# Patient Record
Sex: FEMALE | Race: WHITE | Employment: UNEMPLOYED | URBAN - METROPOLITAN AREA
[De-identification: names, ages, dates, MRNs, and addresses within clinical notes are randomized per-mention and may not be internally consistent; named-entity substitution may affect disease eponyms.]

---

## 2017-02-09 ENCOUNTER — TRANSFERRED RECORDS (OUTPATIENT)
Dept: HEALTH INFORMATION MANAGEMENT | Facility: CLINIC | Age: 52
End: 2017-02-09

## 2017-02-09 LAB — EJECTION FRACTION: 58

## 2017-02-13 ENCOUNTER — TELEPHONE (OUTPATIENT)
Dept: INTERNAL MEDICINE | Facility: CLINIC | Age: 52
End: 2017-02-13

## 2017-02-13 NOTE — TELEPHONE ENCOUNTER
Hospital follow up    Admission date: 2/9/17? Pt unsure     Discharge date: 2/12/17    Hospital: [facility]    Reason for hospitalization: pt unsure                     (Note to staff: cut and paste discharge summary here, or go into Care                                     Everywhere, or have Pt sign FRITZ and call for fax of information)    Discharge summary reviewed:       Medication changes:       Follow up needed for today:       Current status:

## 2017-02-13 NOTE — TELEPHONE ENCOUNTER
"  ED for acute condition Discharge Protocol    \"Hi, my name is Jennifer Salas, a registered nurse, and I am calling from [facility].  I am calling to follow up and see how things are going for you after your recent emergency visit.\"    Tell me how you are doing now that you are home?\"   Doing pretty good, now using oxygen.  Cough continues.       Discharge Instructions    \"Let's review your discharge instructions.  What is/are the follow-up recommendations?  Pt. Response: Oxygen, See Cardiologist, PCP on 02/17/2017     \"Has an appointment with your primary care provider been scheduled?\"  Yes. (confirm and remind to bring meds)    Medications    \"Tell me what changed about your medicines when you discharged?\"    Start Oxygen, reports she will bring list of medications with to her appointment.     \"What questions do you have about your medications?\"   None        Call Summary    \"What questions or concerns do you have about your recent visit and your follow-up care?\"     none    \"If you have questions or things don't continue to improve, we encourage you contact us through the main clinic number (give number).  Even if the clinic is not open, triage nurses are available 24/7 to help you.     We would like you to know that our clinic has extended hours (provide information).  We also have urgent care (provide details on closest location and hours/contact info)\"    \"Thank you for your time and take care!\"      Jennifer Salas RN           "

## 2017-02-14 ENCOUNTER — TRANSFERRED RECORDS (OUTPATIENT)
Dept: HEALTH INFORMATION MANAGEMENT | Facility: CLINIC | Age: 52
End: 2017-02-14

## 2017-02-15 ENCOUNTER — TELEPHONE (OUTPATIENT)
Dept: FAMILY MEDICINE | Facility: CLINIC | Age: 52
End: 2017-02-15

## 2017-02-15 NOTE — TELEPHONE ENCOUNTER
Nurse called back to give a correction to this message. Patient is having an angiogram, not a stress test on Friday.

## 2017-02-15 NOTE — TELEPHONE ENCOUNTER
She completed a post discharge follow up call today.  She is doing well.  She did have a question with her asperin.  It says to take a chewable but she has been taking a regular aspirin with breakfast.  Did discuss other red flags to watch for.  You can call with questions.  She will follow up in two weeks.  She has a stress test on Friday.

## 2017-02-17 ENCOUNTER — TRANSFERRED RECORDS (OUTPATIENT)
Dept: HEALTH INFORMATION MANAGEMENT | Facility: CLINIC | Age: 52
End: 2017-02-17

## 2017-02-17 LAB
CHOLEST SERPL-MCNC: 203 MG/DL (ref 100–199)
HDLC SERPL-MCNC: 45 MG/DL
LDLC SERPL CALC-MCNC: 129 MG/DL
NONHDLC SERPL-MCNC: 158 MG/DL
TRIGL SERPL-MCNC: 146 MG/DL

## 2017-02-21 ENCOUNTER — OFFICE VISIT (OUTPATIENT)
Dept: INTERNAL MEDICINE | Facility: CLINIC | Age: 52
End: 2017-02-21
Payer: COMMERCIAL

## 2017-02-21 VITALS
TEMPERATURE: 98.4 F | BODY MASS INDEX: 29.81 KG/M2 | HEART RATE: 66 BPM | HEIGHT: 62 IN | RESPIRATION RATE: 16 BRPM | DIASTOLIC BLOOD PRESSURE: 66 MMHG | SYSTOLIC BLOOD PRESSURE: 126 MMHG | OXYGEN SATURATION: 98 % | WEIGHT: 162 LBS

## 2017-02-21 DIAGNOSIS — J44.9 CHRONIC OBSTRUCTIVE PULMONARY DISEASE, UNSPECIFIED COPD TYPE (H): Primary | ICD-10-CM

## 2017-02-21 DIAGNOSIS — E11.65 TYPE 2 DIABETES MELLITUS WITH HYPERGLYCEMIA, WITHOUT LONG-TERM CURRENT USE OF INSULIN (H): ICD-10-CM

## 2017-02-21 DIAGNOSIS — I25.10 CORONARY ARTERY DISEASE INVOLVING NATIVE CORONARY ARTERY OF NATIVE HEART, ANGINA PRESENCE UNSPECIFIED: ICD-10-CM

## 2017-02-21 LAB
ALBUMIN SERPL-MCNC: 2.8 G/DL (ref 3.4–5)
ALP SERPL-CCNC: 167 U/L (ref 40–150)
ALT SERPL W P-5'-P-CCNC: 29 U/L (ref 0–50)
ANION GAP SERPL CALCULATED.3IONS-SCNC: 2 MMOL/L (ref 3–14)
AST SERPL W P-5'-P-CCNC: 12 U/L (ref 0–45)
BILIRUB SERPL-MCNC: 0.4 MG/DL (ref 0.2–1.3)
BUN SERPL-MCNC: 15 MG/DL (ref 7–30)
CALCIUM SERPL-MCNC: 8.7 MG/DL (ref 8.5–10.1)
CHLORIDE SERPL-SCNC: 103 MMOL/L (ref 94–109)
CO2 SERPL-SCNC: 38 MMOL/L (ref 20–32)
CREAT SERPL-MCNC: 0.7 MG/DL (ref 0.52–1.04)
ERYTHROCYTE [DISTWIDTH] IN BLOOD BY AUTOMATED COUNT: 14.6 % (ref 10–15)
GFR SERPL CREATININE-BSD FRML MDRD: 88 ML/MIN/1.7M2
GLUCOSE SERPL-MCNC: 106 MG/DL (ref 70–99)
HBA1C MFR BLD: 7.3 % (ref 4.3–6)
HCT VFR BLD AUTO: 43.7 % (ref 35–47)
HGB BLD-MCNC: 13.1 G/DL (ref 11.7–15.7)
MCH RBC QN AUTO: 30 PG (ref 26.5–33)
MCHC RBC AUTO-ENTMCNC: 30 G/DL (ref 31.5–36.5)
MCV RBC AUTO: 100 FL (ref 78–100)
PLATELET # BLD AUTO: 192 10E9/L (ref 150–450)
POTASSIUM SERPL-SCNC: 4.5 MMOL/L (ref 3.4–5.3)
PROT SERPL-MCNC: 6.6 G/DL (ref 6.8–8.8)
RBC # BLD AUTO: 4.36 10E12/L (ref 3.8–5.2)
SODIUM SERPL-SCNC: 143 MMOL/L (ref 133–144)
WBC # BLD AUTO: 7.5 10E9/L (ref 4–11)

## 2017-02-21 PROCEDURE — 83036 HEMOGLOBIN GLYCOSYLATED A1C: CPT | Performed by: INTERNAL MEDICINE

## 2017-02-21 PROCEDURE — 80053 COMPREHEN METABOLIC PANEL: CPT | Performed by: INTERNAL MEDICINE

## 2017-02-21 PROCEDURE — 36415 COLL VENOUS BLD VENIPUNCTURE: CPT | Performed by: INTERNAL MEDICINE

## 2017-02-21 PROCEDURE — 99214 OFFICE O/P EST MOD 30 MIN: CPT | Performed by: INTERNAL MEDICINE

## 2017-02-21 PROCEDURE — 85027 COMPLETE CBC AUTOMATED: CPT | Performed by: INTERNAL MEDICINE

## 2017-02-21 RX ORDER — METOPROLOL TARTRATE 50 MG
50 TABLET ORAL 2 TIMES DAILY
COMMUNITY
End: 2017-03-14

## 2017-02-21 RX ORDER — QUETIAPINE FUMARATE 25 MG/1
25 TABLET, FILM COATED ORAL AT BEDTIME
COMMUNITY
End: 2017-02-27

## 2017-02-21 ASSESSMENT — PAIN SCALES - GENERAL: PAINLEVEL: MILD PAIN (2)

## 2017-02-21 NOTE — PROGRESS NOTES
SUBJECTIVE:                                                    Bernard Hughes is a 51 year old female who presents to clinic today for the following health issues:      Hospital Follow-up Visit:    Hospital/Nursing Home/IP Rehab Facility: [facility]  Date of Admission: 2/16/17  Date of Discharge: 2/18/17  Reason(s) for Admission: COPD/Bronchitis            Problems taking medications regularly:  None       Medication changes since discharge: None       Problems adhering to non-medication therapy:  None    Summary of hospitalization:  See outside records, reviewed and scanned  Diagnostic Tests/Treatments reviewed.  Follow up needed: none  Other Healthcare Providers Involved in Patient s Care:         None  Update since discharge: improved.     Post Discharge Medication Reconciliation: discharge medications reconciled and changed, per note/orders (see AVS).  Plan of care communicated with patient and family       She was hospitalized for copd exacerbation and then found to have some heart issues.     Repeat hospitalization for an angiogram and IVF.    Plan for triple bypass on March 3rd with Dr Gary.     Lungs are better, was on prednisone and antibiotics, still some cough and congestion.  Feels a lot better.  Had an echo that was abnormal.      Sugars are getting back to normal on glyburide and metformin combination, high was 200, yesterday was 107.      Past Medical History   Diagnosis Date     COPD (chronic obstructive pulmonary disease) (H)      Diabetes (H)      Hypertension      Kidney disease    .ASCVD    Past Surgical History   Procedure Laterality Date     Ceserean       Current Outpatient Prescriptions   Medication     metoprolol (LOPRESSOR) 50 MG tablet     QUEtiapine (SEROQUEL) 25 MG tablet     albuterol (VENTOLIN HFA) 108 (90 BASE) MCG/ACT Inhaler     SPIRIVA HANDIHALER 18 MCG inhalation capsule     FLUoxetine (PROZAC) 20 MG capsule     glyBURIDE-metFORMIN (GLUCOVANCE) 5-500 MG per tablet     lisinopril  "(PRINIVIL,ZESTRIL) 20 MG tablet     aspirin 81 MG EC tablet     atorvastatin (LIPITOR) 20 MG tablet     No current facility-administered medications for this visit.      Social History   Substance Use Topics     Smoking status: Former Smoker     Smokeless tobacco: Not on file     Alcohol use Not on file     Review of Systems  Constitutional-No fevers, chills, or weight changes..  ENT-No earpain, sore throat, voice changes or rhinitis.  Cardiac-No chest pain or palpitations.  Respiratory-Cough without sputum and SOB.  Musculoskeletal-No muscles aches or joint pains.    Physical Exam  /66 (BP Location: Left arm, Patient Position: Chair, Cuff Size: Adult Regular)  Pulse 66  Temp 98.4  F (36.9  C) (Temporal)  Resp 16  Ht 5' 2\" (1.575 m)  Wt 162 lb (73.5 kg)  SpO2 98%  BMI 29.63 kg/m2  General Appearance-alert, no distress  Cardiac-regular rate and rhythm  with normal S1, S2 ; no murmur, rub or gallops  Lungs-mild to moderate expiratory rhonchi  Extremities-no peripheral edema, peripheral pulses normal    ASSESSMENT:  Patient has history of COPD, diabetes, and was a smoker. She had a COPD exacerbation was hospitalized at [facility]. All there she had an echo which showed a area of a possible old MI with preserved ejection fraction. She had an elevation of her troponin. Follow-up with cardiology recommended an angiogram and she was found to have three-vessel disease. The plan is for her to have coronary artery bypass surgery on March 3 with Dr. Gary.    COPD-the patient COPD is improving she has quit smoking and is congratulated on this. She has finished her prednisone and antibiotics. She continues on Spiriva and oxygen therapy.    Coronary artery disease-the patient is on aspirin, atorvastatin, metoprolol and lisinopril. The plan is to do bypass surgery on March 3.    Diabetes-the patient is on glyburide and metformin, her sugars are normally pretty good and we'll recheck an A1c today. She will need insulin " during her hospitalization.    Post angiogram we will check her renal function today.    Tobacco abuse-the patient is congratulated on her smoking cessation.    Long discussion about the need for cardiac rehabilitation and how this will help her lungs and heart post surgery.    Electronically signed by Dorian Cade MD

## 2017-02-21 NOTE — NURSING NOTE
"Chief Complaint   Patient presents with     Hospital F/U       Initial /66 (BP Location: Left arm, Patient Position: Chair, Cuff Size: Adult Regular)  Pulse 66  Temp 98.4  F (36.9  C) (Temporal)  Resp 16  Ht 5' 2\" (1.575 m)  Wt 162 lb (73.5 kg)  SpO2 98%  BMI 29.63 kg/m2 Estimated body mass index is 29.63 kg/(m^2) as calculated from the following:    Height as of this encounter: 5' 2\" (1.575 m).    Weight as of this encounter: 162 lb (73.5 kg).  Medication Reconciliation: complete   Taylor Hughes MA    "

## 2017-02-21 NOTE — MR AVS SNAPSHOT
"              After Visit Summary   2017    Bernard Hughes    MRN: [id number]           Patient Information     Date Of Birth          1965        Visit Information        Provider Department      2017 7:00 AM Dorian Cade MD [facility]         Follow-ups after your visit        Who to contact     If you have questions or need follow up information about today's clinic visit or your schedule please contact [facility] directly at [phone].  Normal or non-critical lab and imaging results will be communicated to you by Servo Softwarehart, letter or phone within 4 business days after the clinic has received the results. If you do not hear from us within 7 days, please contact the clinic through [employer] Software[employer] or phone. If you have a critical or abnormal lab result, we will notify you by phone as soon as possible.  Submit refill requests through Advaction or call your pharmacy and they will forward the refill request to us. Please allow 3 business days for your refill to be completed.          Additional Information About Your Visit        Serv[employer] Software[employer] Information     Advaction lets you send messages to your doctor, view your test results, renew your prescriptions, schedule appointments and more. To sign up, go to [email or web address] . Click on \"Log in\" on the left side of the screen, which will take you to the Welcome page. Then click on \"Sign up Now\" on the right side of the page.     You will be asked to enter the access code listed below, as well as some personal information. Please follow the directions to create your username and password.     Your access code is: [email or web address]  Expires: 2017  7:09 AM     Your access code will  in 90 days. If you need help or a new code, please call your [facility] or [phone].        Care EveryWhere ID     This is your Care EveryWhere ID. This could be used by other organizations to access your [place] medical " "records  [id number]        Your Vitals Were     Pulse Temperature Respirations Height Pulse Oximetry BMI (Body Mass Index)    66 98.4  F (36.9  C) (Temporal) 16 5' 2\" (1.575 m) 98% 29.63 kg/m2       Blood Pressure from Last 3 Encounters:   02/21/17 126/66   05/31/16 128/86   12/16/15 128/82    Weight from Last 3 Encounters:   02/21/17 162 lb (73.5 kg)   05/31/16 154 lb (69.9 kg)   12/16/15 140 lb (63.5 kg)              Today, you had the following     No orders found for display         Today's Medication Changes          These changes are accurate as of: 2/21/17  7:09 AM.  If you have any questions, ask your nurse or doctor.               These medicines have changed or have updated prescriptions.        Dose/Directions    atorvastatin 20 MG tablet   Commonly known as:  LIPITOR   This may have changed:  how much to take   Used for:  Type 2 diabetes mellitus with hyperglycemia (H)        Dose:  20 mg   Take 1 tablet (20 mg) by mouth daily   Quantity:  90 tablet   Refills:  1                Primary Care Provider Office Phone # Fax #    Dorian Cade [phone]       [facility] [address] [place] [address]        Thank you!     Thank you for choosing [facility]  for your care. Our goal is always to provide you with excellent care. Hearing back from our patients is one way we can continue to improve our services. Please take a few minutes to complete the written survey that you may receive in the mail after your visit with us. Thank you!             Your Updated Medication List - Protect others around you: Learn how to safely use, store and throw away your medicines at www.disposemymeds.org.          This list is accurate as of: 2/21/17  7:09 AM.  Always use your most recent med list.                   Brand Name Dispense Instructions for use    albuterol 108 (90 BASE) MCG/ACT Inhaler    VENTOLIN HFA    18 g    INHALE 2 PUFFS INTO THE LUNGS EVERY 4 HOURS AS " NEEDED FOR SHORTNESS OF BREATH / DYSPNEA       aspirin 81 MG EC tablet     90 tablet    Take 1 tablet (81 mg) by mouth daily       atorvastatin 20 MG tablet    LIPITOR    90 tablet    Take 1 tablet (20 mg) by mouth daily       FLUoxetine 20 MG capsule    PROzac    90 capsule    Take 1 capsule (20 mg) by mouth daily       glyBURIDE-metFORMIN 5-500 MG per tablet    GLUCOVANCE    360 tablet    Take 2 tablets by mouth 2 times daily (with meals)       lisinopril 20 MG tablet    PRINIVIL/ZESTRIL    180 tablet    Take 2 tablets (40 mg) by mouth daily       metoprolol 50 MG tablet    LOPRESSOR     Take 50 mg by mouth 2 times daily       QUEtiapine 25 MG tablet    SEROquel     Take 25 mg by mouth At Bedtime       SPIRIVA HANDIHALER 18 MCG capsule   Generic drug:  tiotropium     90 capsule    USING THE HANDIHALER, INHALE THE CONTENTS OF ONE CAPSULE DAILY

## 2017-02-27 ENCOUNTER — TELEPHONE (OUTPATIENT)
Dept: INTERNAL MEDICINE | Facility: CLINIC | Age: 52
End: 2017-02-27

## 2017-02-27 ENCOUNTER — HOSPITAL ENCOUNTER (OUTPATIENT)
Dept: GENERAL RADIOLOGY | Facility: CLINIC | Age: 52
Discharge: HOME | End: 2017-02-27
Attending: INTERNAL MEDICINE | Admitting: INTERNAL MEDICINE
Payer: COMMERCIAL

## 2017-02-27 ENCOUNTER — OFFICE VISIT (OUTPATIENT)
Dept: INTERNAL MEDICINE | Facility: CLINIC | Age: 52
End: 2017-02-27
Payer: COMMERCIAL

## 2017-02-27 VITALS
HEART RATE: 62 BPM | RESPIRATION RATE: 16 BRPM | WEIGHT: 162 LBS | DIASTOLIC BLOOD PRESSURE: 86 MMHG | BODY MASS INDEX: 29.63 KG/M2 | SYSTOLIC BLOOD PRESSURE: 164 MMHG | TEMPERATURE: 97.1 F

## 2017-02-27 DIAGNOSIS — I25.10 CORONARY ARTERY DISEASE INVOLVING NATIVE CORONARY ARTERY OF NATIVE HEART, ANGINA PRESENCE UNSPECIFIED: ICD-10-CM

## 2017-02-27 DIAGNOSIS — J44.1 CHRONIC OBSTRUCTIVE PULMONARY DISEASE WITH ACUTE EXACERBATION (H): Primary | ICD-10-CM

## 2017-02-27 DIAGNOSIS — J44.1 CHRONIC OBSTRUCTIVE PULMONARY DISEASE WITH ACUTE EXACERBATION (H): ICD-10-CM

## 2017-02-27 PROCEDURE — 99213 OFFICE O/P EST LOW 20 MIN: CPT | Performed by: INTERNAL MEDICINE

## 2017-02-27 PROCEDURE — 71020 XR CHEST 2 VW: CPT | Mod: TC

## 2017-02-27 RX ORDER — AZITHROMYCIN 250 MG/1
TABLET, FILM COATED ORAL
Qty: 6 TABLET | Refills: 0 | Status: SHIPPED | OUTPATIENT
Start: 2017-02-27 | End: 2017-03-14

## 2017-02-27 ASSESSMENT — PAIN SCALES - GENERAL: PAINLEVEL: NO PAIN (0)

## 2017-02-27 NOTE — MR AVS SNAPSHOT
"              After Visit Summary   2017    Bernard Hughes    MRN: [id number]           Patient Information     Date Of Birth          1965        Visit Information        Provider Department      2017 3:45 PM Dorian Cade MD [facility]         Follow-ups after your visit        Who to contact     If you have questions or need follow up information about today's clinic visit or your schedule please contact [facility] directly at [phone].  Normal or non-critical lab and imaging results will be communicated to you by The Daily Hundredhart, letter or phone within 4 business days after the clinic has received the results. If you do not hear from us within 7 days, please contact the clinic through The Daily Hundredhart or phone. If you have a critical or abnormal lab result, we will notify you by phone as soon as possible.  Submit refill requests through Anexon or call your pharmacy and they will forward the refill request to us. Please allow 3 business days for your refill to be completed.          Additional Information About Your Visit        The Daily HundredThe [facility] Networks Information     Anexon lets you send messages to your doctor, view your test results, renew your prescriptions, schedule appointments and more. To sign up, go to [email or web address] . Click on \"Log in\" on the left side of the screen, which will take you to the Welcome page. Then click on \"Sign up Now\" on the right side of the page.     You will be asked to enter the access code listed below, as well as some personal information. Please follow the directions to create your username and password.     Your access code is: [email or web address]  Expires: 2017  7:09 AM     Your access code will  in 90 days. If you need help or a new code, please call your [facility] or [phone].        Care EveryWhere ID     This is your Care EveryWhere ID. This could be used by other organizations to access your [place] medical " records  [id number]        Your Vitals Were     Pulse Temperature Respirations BMI (Body Mass Index)          62 97.1  F (36.2  C) (Tympanic) 16 29.63 kg/m2         Blood Pressure from Last 3 Encounters:   02/27/17 180/90   02/21/17 126/66   05/31/16 128/86    Weight from Last 3 Encounters:   02/27/17 162 lb (73.5 kg)   02/21/17 162 lb (73.5 kg)   05/31/16 154 lb (69.9 kg)              Today, you had the following     No orders found for display         Today's Medication Changes          These changes are accurate as of: 2/27/17  3:50 PM.  If you have any questions, ask your nurse or doctor.               These medicines have changed or have updated prescriptions.        Dose/Directions    atorvastatin 20 MG tablet   Commonly known as:  LIPITOR   This may have changed:  how much to take   Used for:  Type 2 diabetes mellitus with hyperglycemia (H)        Dose:  20 mg   Take 1 tablet (20 mg) by mouth daily   Quantity:  90 tablet   Refills:  1                Primary Care Provider Office Phone # Fax #    Dorian Cade [phone]       [facility] [address] [place] [address]        Thank you!     Thank you for choosing [facility]  for your care. Our goal is always to provide you with excellent care. Hearing back from our patients is one way we can continue to improve our services. Please take a few minutes to complete the written survey that you may receive in the mail after your visit with us. Thank you!             Your Updated Medication List - Protect others around you: Learn how to safely use, store and throw away your medicines at www.disposemymeds.org.          This list is accurate as of: 2/27/17  3:50 PM.  Always use your most recent med list.                   Brand Name Dispense Instructions for use    albuterol 108 (90 BASE) MCG/ACT Inhaler    VENTOLIN HFA    18 g    INHALE 2 PUFFS INTO THE LUNGS EVERY 4 HOURS AS NEEDED FOR SHORTNESS OF BREATH /  DYSPNEA       aspirin 81 MG EC tablet     90 tablet    Take 1 tablet (81 mg) by mouth daily       atorvastatin 20 MG tablet    LIPITOR    90 tablet    Take 1 tablet (20 mg) by mouth daily       FLUoxetine 20 MG capsule    PROzac    90 capsule    Take 1 capsule (20 mg) by mouth daily       glyBURIDE-metFORMIN 5-500 MG per tablet    GLUCOVANCE    360 tablet    Take 2 tablets by mouth 2 times daily (with meals)       lisinopril 20 MG tablet    PRINIVIL/ZESTRIL    180 tablet    Take 2 tablets (40 mg) by mouth daily       metoprolol 50 MG tablet    LOPRESSOR     Take 50 mg by mouth 2 times daily       SPIRIVA HANDIHALER 18 MCG capsule   Generic drug:  tiotropium     90 capsule    USING THE HANDIHALER, INHALE THE CONTENTS OF ONE CAPSULE DAILY

## 2017-02-27 NOTE — NURSING NOTE
"Chief Complaint   Patient presents with     Cough     follow up       Initial /90 (BP Location: Left arm, Patient Position: Chair, Cuff Size: Adult Regular)  Pulse 62  Temp 97.1  F (36.2  C) (Tympanic)  Resp 16  Wt 162 lb (73.5 kg)  BMI 29.63 kg/m2 Estimated body mass index is 29.63 kg/(m^2) as calculated from the following:    Height as of 2/21/17: 5' 2\" (1.575 m).    Weight as of this encounter: 162 lb (73.5 kg).  Medication Reconciliation: complete   Yuly Lenz CMA (AAMA)   "

## 2017-02-27 NOTE — PROGRESS NOTES
SUBJECTIVE:                                                    Bernard Hughes is a 51 year old female who presents to clinic today for the following health issues:      Clinic Visit Followup:    Facility:  [facility]  Date of visit: 2/21/17  Reason for visit: COPD, Cough  Current Status: Patient is still coughing alot     She is having sweats and chills, then more cough and feels like bronchitis. Some sputum as well.  Still eating.  Green sputum and phlegm.      Supposed to have triple bypass this Thursday.  BP is running high as well.    Past Medical History   Diagnosis Date     ASCVD (arteriosclerotic cardiovascular disease)      3 vessel bypass 3/2017     COPD (chronic obstructive pulmonary disease) (H)      Diabetes (H)      Hypertension      Kidney disease      Current Outpatient Prescriptions   Medication     metoprolol (LOPRESSOR) 50 MG tablet     SPIRIVA HANDIHALER 18 MCG inhalation capsule     FLUoxetine (PROZAC) 20 MG capsule     atorvastatin (LIPITOR) 20 MG tablet     glyBURIDE-metFORMIN (GLUCOVANCE) 5-500 MG per tablet     lisinopril (PRINIVIL,ZESTRIL) 20 MG tablet     aspirin 81 MG EC tablet     albuterol (VENTOLIN HFA) 108 (90 BASE) MCG/ACT Inhaler     No current facility-administered medications for this visit.      Social History   Substance Use Topics     Smoking status: Former Smoker     Smokeless tobacco: Not on file     Alcohol use Not on file     Physical Exam  /90 (BP Location: Left arm, Patient Position: Chair, Cuff Size: Adult Regular)  Pulse 62  Temp 97.1  F (36.2  C) (Tympanic)  Resp 16  Wt 162 lb (73.5 kg)  BMI 29.63 kg/m2  General Appearance-alert, mild distress  ENT-ENT exam normal, no neck nodes or sinus tenderness  Cardiac-regular rate and rhythm  with normal S1, S2 ; no murmur, rub or gallops  Lungs-mild to moderate expiratory rhonchi    ASSESSMENT:  Patient has a history of COPD, presented with COPD exacerbation with cough, fever, greenish sputum. We will treat her with a Z-Maurilio  today. She is not wheezing so we'll avoid steroids. I will get a chest x-ray is expected to have triple bypass heart surgery later this week however this may be canceled.

## 2017-02-27 NOTE — TELEPHONE ENCOUNTER
Reason for Call:  Medication or medication refill:    Do you use a [facility]?  Name of the pharmacy and phone number for the current request:  [facility]- [phone]    Name of the medication requested: antibiotic     Other request: pt stated she has bronchitis. Pt is scheduled for surgery 3/2.    Can we leave a detailed message on this number? YES    Phone number patient can be reached at: Home number on file [phone] (home)    Best Time:     Call taken on 2/27/2017 at 7:48 AM by Tamar Davis

## 2017-03-01 ENCOUNTER — OFFICE VISIT (OUTPATIENT)
Dept: INTERNAL MEDICINE | Facility: CLINIC | Age: 52
End: 2017-03-01
Payer: COMMERCIAL

## 2017-03-01 VITALS
RESPIRATION RATE: 16 BRPM | WEIGHT: 160 LBS | TEMPERATURE: 97.9 F | BODY MASS INDEX: 29.44 KG/M2 | DIASTOLIC BLOOD PRESSURE: 86 MMHG | OXYGEN SATURATION: 95 % | SYSTOLIC BLOOD PRESSURE: 134 MMHG | HEART RATE: 64 BPM | HEIGHT: 62 IN

## 2017-03-01 DIAGNOSIS — J44.1 CHRONIC OBSTRUCTIVE PULMONARY DISEASE WITH ACUTE EXACERBATION (H): Primary | ICD-10-CM

## 2017-03-01 PROCEDURE — 99213 OFFICE O/P EST LOW 20 MIN: CPT | Performed by: INTERNAL MEDICINE

## 2017-03-01 ASSESSMENT — PAIN SCALES - GENERAL: PAINLEVEL: NO PAIN (0)

## 2017-03-01 NOTE — MR AVS SNAPSHOT
"              After Visit Summary   3/1/2017    Bernard Hughes    MRN: [id number]           Patient Information     Date Of Birth          1965        Visit Information        Provider Department      3/1/2017 2:30 PM Dorian Cade MD [facility]        Today's Diagnoses     Chronic obstructive pulmonary disease with acute exacerbation (H)    -  1       Follow-ups after your visit        Who to contact     If you have questions or need follow up information about today's clinic visit or your schedule please contact [facility] directly at [phone].  Normal or non-critical lab and imaging results will be communicated to you by ZeroWire Inchart, letter or phone within 4 business days after the clinic has received the results. If you do not hear from us within 7 days, please contact the clinic through Fik Storest or phone. If you have a critical or abnormal lab result, we will notify you by phone as soon as possible.  Submit refill requests through Vyykn or call your pharmacy and they will forward the refill request to us. Please allow 3 business days for your refill to be completed.          Additional Information About Your Visit        MyChart Information     Vyykn lets you send messages to your doctor, view your test results, renew your prescriptions, schedule appointments and more. To sign up, go to [email or web address] . Click on \"Log in\" on the left side of the screen, which will take you to the Welcome page. Then click on \"Sign up Now\" on the right side of the page.     You will be asked to enter the access code listed below, as well as some personal information. Please follow the directions to create your username and password.     Your access code is: [email or web address]  Expires: 2017  7:09 AM     Your access code will  in 90 days. If you need help or a new code, please call your [facility] or [phone].        Care EveryWhere ID     This is your Care " "EveryWhere ID. This could be used by other organizations to access your [place] medical records  [id number]        Your Vitals Were     Pulse Temperature Respirations Height Pulse Oximetry BMI (Body Mass Index)    64 97.9  F (36.6  C) (Tympanic) 16 5' 2\" (1.575 m) 95% 29.26 kg/m2       Blood Pressure from Last 3 Encounters:   03/01/17 134/86   02/27/17 164/86   02/21/17 126/66    Weight from Last 3 Encounters:   03/01/17 160 lb (72.6 kg)   02/27/17 162 lb (73.5 kg)   02/21/17 162 lb (73.5 kg)              Today, you had the following     No orders found for display         Today's Medication Changes          These changes are accurate as of: 3/1/17  4:12 PM.  If you have any questions, ask your nurse or doctor.               These medicines have changed or have updated prescriptions.        Dose/Directions    atorvastatin 20 MG tablet   Commonly known as:  LIPITOR   This may have changed:  how much to take   Used for:  Type 2 diabetes mellitus with hyperglycemia (H)        Dose:  20 mg   Take 1 tablet (20 mg) by mouth daily   Quantity:  90 tablet   Refills:  1                Primary Care Provider Office Phone # Fax #    Dorian Cade [phone]       [facility] [address] [place] [address]        Thank you!     Thank you for choosing [facility]  for your care. Our goal is always to provide you with excellent care. Hearing back from our patients is one way we can continue to improve our services. Please take a few minutes to complete the written survey that you may receive in the mail after your visit with us. Thank you!             Your Updated Medication List - Protect others around you: Learn how to safely use, store and throw away your medicines at www.disposemymeds.org.          This list is accurate as of: 3/1/17  4:12 PM.  Always use your most recent med list.                   Brand Name Dispense Instructions for use    albuterol 108 (90 BASE) " MCG/ACT Inhaler    VENTOLIN HFA    18 g    INHALE 2 PUFFS INTO THE LUNGS EVERY 4 HOURS AS NEEDED FOR SHORTNESS OF BREATH / DYSPNEA       aspirin 81 MG EC tablet     90 tablet    Take 1 tablet (81 mg) by mouth daily       atorvastatin 20 MG tablet    LIPITOR    90 tablet    Take 1 tablet (20 mg) by mouth daily       azithromycin 250 MG tablet    ZITHROMAX    6 tablet    Two tablets first day, then one tablet daily for four days.       FLUoxetine 20 MG capsule    PROzac    90 capsule    Take 1 capsule (20 mg) by mouth daily       glyBURIDE-metFORMIN 5-500 MG per tablet    GLUCOVANCE    360 tablet    Take 2 tablets by mouth 2 times daily (with meals)       lisinopril 20 MG tablet    PRINIVIL/ZESTRIL    180 tablet    Take 2 tablets (40 mg) by mouth daily       metoprolol 50 MG tablet    LOPRESSOR     Take 50 mg by mouth 2 times daily       SPIRIVA HANDIHALER 18 MCG capsule   Generic drug:  tiotropium     90 capsule    USING THE HANDIHALER, INHALE THE CONTENTS OF ONE CAPSULE DAILY

## 2017-03-01 NOTE — NURSING NOTE
"Chief Complaint   Patient presents with     Cough     recheck and she is doing alot better       Initial /86 (BP Location: Right arm, Patient Position: Chair, Cuff Size: Adult Regular)  Pulse 64  Temp 97.9  F (36.6  C) (Tympanic)  Resp 16  Ht 5' 2\" (1.575 m)  Wt 160 lb (72.6 kg)  SpO2 95%  BMI 29.26 kg/m2 Estimated body mass index is 29.26 kg/(m^2) as calculated from the following:    Height as of this encounter: 5' 2\" (1.575 m).    Weight as of this encounter: 160 lb (72.6 kg).  Medication Reconciliation: complete    "

## 2017-03-01 NOTE — PROGRESS NOTES
"  SUBJECTIVE:                                                    Bernard Hughes is a 51 year old female who presents to clinic today for the following health issues:      Chief Complaint   Patient presents with     Cough     recheck and she is doing alot better     Monday was quite sick with respiratory infection.  zpak and she is getting better.  Still some cough, much improved.      She was seen today for the pre op and they were with her going for cabg tomorrow.      Past Medical History   Diagnosis Date     ASCVD (arteriosclerotic cardiovascular disease)      3 vessel bypass 3/2017     COPD (chronic obstructive pulmonary disease) (H)      Diabetes (H)      Hypertension      Kidney disease      Current Outpatient Prescriptions   Medication     azithromycin (ZITHROMAX) 250 MG tablet     metoprolol (LOPRESSOR) 50 MG tablet     albuterol (VENTOLIN HFA) 108 (90 BASE) MCG/ACT Inhaler     SPIRIVA HANDIHALER 18 MCG inhalation capsule     FLUoxetine (PROZAC) 20 MG capsule     atorvastatin (LIPITOR) 20 MG tablet     glyBURIDE-metFORMIN (GLUCOVANCE) 5-500 MG per tablet     lisinopril (PRINIVIL,ZESTRIL) 20 MG tablet     aspirin 81 MG EC tablet     No current facility-administered medications for this visit.      Physical Exam  /86 (BP Location: Right arm, Patient Position: Chair, Cuff Size: Adult Regular)  Pulse 64  Temp 97.9  F (36.6  C) (Tympanic)  Resp 16  Ht 5' 2\" (1.575 m)  Wt 160 lb (72.6 kg)  SpO2 95%  BMI 29.26 kg/m2  General Appearance-healthy, alert, no distress--much improved appearance, color is better, more stable breathing and relaxed  Pulmonary-patient's breathing has some rhonchi but no wheezing, sounds throughout all lobes    Chest x-ray was normal without any infiltrate 2 days ago    ASSESSMENT:  Patient notes severe COPD, known coronary disease and needs triple bypass. This is scheduled for tomorrow. Unfortunately she had COPD exacerbation on Monday. We treated her with azithromycin and she is " much much improved. She saw her preop team today and they cleared her for surgery which I agree with due to her great improvement.    Electronically signed by Dorian Cade MD

## 2017-03-06 ENCOUNTER — TRANSFERRED RECORDS (OUTPATIENT)
Dept: HEALTH INFORMATION MANAGEMENT | Facility: CLINIC | Age: 52
End: 2017-03-06

## 2017-03-13 ENCOUNTER — TRANSFERRED RECORDS (OUTPATIENT)
Dept: HEALTH INFORMATION MANAGEMENT | Facility: CLINIC | Age: 52
End: 2017-03-13

## 2017-03-13 LAB — PHQ9 SCORE: 10

## 2017-03-14 ENCOUNTER — OFFICE VISIT (OUTPATIENT)
Dept: INTERNAL MEDICINE | Facility: CLINIC | Age: 52
End: 2017-03-14
Payer: COMMERCIAL

## 2017-03-14 VITALS
BODY MASS INDEX: 29.63 KG/M2 | WEIGHT: 162 LBS | OXYGEN SATURATION: 93 % | TEMPERATURE: 97.7 F | RESPIRATION RATE: 16 BRPM | SYSTOLIC BLOOD PRESSURE: 126 MMHG | DIASTOLIC BLOOD PRESSURE: 68 MMHG | HEART RATE: 56 BPM

## 2017-03-14 DIAGNOSIS — I10 ESSENTIAL HYPERTENSION WITH GOAL BLOOD PRESSURE LESS THAN 140/90: ICD-10-CM

## 2017-03-14 DIAGNOSIS — I25.10 CORONARY ARTERY DISEASE INVOLVING NATIVE CORONARY ARTERY OF NATIVE HEART, ANGINA PRESENCE UNSPECIFIED: Primary | ICD-10-CM

## 2017-03-14 DIAGNOSIS — Z98.61 STATUS POST PERCUTANEOUS TRANSLUMINAL CORONARY ANGIOPLASTY: ICD-10-CM

## 2017-03-14 DIAGNOSIS — F43.21 ADJUSTMENT DISORDER WITH DEPRESSED MOOD: ICD-10-CM

## 2017-03-14 LAB
ANION GAP SERPL CALCULATED.3IONS-SCNC: 2 MMOL/L (ref 3–14)
BUN SERPL-MCNC: 14 MG/DL (ref 7–30)
CALCIUM SERPL-MCNC: 9.1 MG/DL (ref 8.5–10.1)
CHLORIDE SERPL-SCNC: 101 MMOL/L (ref 94–109)
CO2 SERPL-SCNC: 37 MMOL/L (ref 20–32)
CREAT SERPL-MCNC: 0.8 MG/DL (ref 0.52–1.04)
GFR SERPL CREATININE-BSD FRML MDRD: 75 ML/MIN/1.7M2
GLUCOSE SERPL-MCNC: 143 MG/DL (ref 70–99)
POTASSIUM SERPL-SCNC: 4.4 MMOL/L (ref 3.4–5.3)
SODIUM SERPL-SCNC: 140 MMOL/L (ref 133–144)

## 2017-03-14 PROCEDURE — 36415 COLL VENOUS BLD VENIPUNCTURE: CPT | Performed by: INTERNAL MEDICINE

## 2017-03-14 PROCEDURE — 80048 BASIC METABOLIC PNL TOTAL CA: CPT | Performed by: INTERNAL MEDICINE

## 2017-03-14 PROCEDURE — 99214 OFFICE O/P EST MOD 30 MIN: CPT | Performed by: INTERNAL MEDICINE

## 2017-03-14 RX ORDER — METOPROLOL TARTRATE 50 MG
50 TABLET ORAL 2 TIMES DAILY
Qty: 180 TABLET | Refills: 3 | Status: SHIPPED | OUTPATIENT
Start: 2017-03-14 | End: 2018-02-20 | Stop reason: DRUGHIGH

## 2017-03-14 RX ORDER — ATORVASTATIN CALCIUM 40 MG/1
40 TABLET, FILM COATED ORAL DAILY
Qty: 90 TABLET | Refills: 3 | Status: SHIPPED | OUTPATIENT
Start: 2017-03-14 | End: 2017-10-25 | Stop reason: DRUGHIGH

## 2017-03-14 ASSESSMENT — PAIN SCALES - GENERAL: PAINLEVEL: NO PAIN (0)

## 2017-03-14 NOTE — NURSING NOTE
"Chief Complaint   Patient presents with     Hospital F/U       Initial /68 (BP Location: Left arm, Patient Position: Chair, Cuff Size: Adult Regular)  Pulse 56  Temp 97.7  F (36.5  C) (Temporal)  Resp 16  Wt 162 lb (73.5 kg)  SpO2 93%  BMI 29.63 kg/m2 Estimated body mass index is 29.63 kg/(m^2) as calculated from the following:    Height as of 3/1/17: 5' 2\" (1.575 m).    Weight as of this encounter: 162 lb (73.5 kg).  Medication Reconciliation: complete    "

## 2017-03-14 NOTE — MR AVS SNAPSHOT
"              After Visit Summary   3/14/2017    Bernard Hughes    MRN: [id number]           Patient Information     Date Of Birth          1965        Visit Information        Provider Department      3/14/2017 8:00 AM Dorian Cade MD [facility]        Today's Diagnoses     Coronary artery disease involving native coronary artery of native heart, angina presence unspecified    -  1    Adjustment disorder with depressed mood        Essential hypertension with goal blood pressure less than 140/90           Follow-ups after your visit        Who to contact     If you have questions or need follow up information about today's clinic visit or your schedule please contact [facility] directly at [phone].  Normal or non-critical lab and imaging results will be communicated to you by MyChart, letter or phone within 4 business days after the clinic has received the results. If you do not hear from us within 7 days, please contact the clinic through MyChart or phone. If you have a critical or abnormal lab result, we will notify you by phone as soon as possible.  Submit refill requests through Twelve or call your pharmacy and they will forward the refill request to us. Please allow 3 business days for your refill to be completed.          Additional Information About Your Visit        MyChart Information     Twelve lets you send messages to your doctor, view your test results, renew your prescriptions, schedule appointments and more. To sign up, go to [email or web address] . Click on \"Log in\" on the left side of the screen, which will take you to the Welcome page. Then click on \"Sign up Now\" on the right side of the page.     You will be asked to enter the access code listed below, as well as some personal information. Please follow the directions to create your username and password.     Your access code is: [email or web address]  Expires: 5/22/2017  8:09 AM     Your access code will "  in 90 days. If you need help or a new code, please call your [place] clinic or [phone].        Care EveryWhere ID     This is your Care EveryWhere ID. This could be used by other organizations to access your [place] medical records  [id number]        Your Vitals Were     Pulse Temperature Respirations Pulse Oximetry BMI (Body Mass Index)       56 97.7  F (36.5  C) (Temporal) 16 93% 29.63 kg/m2        Blood Pressure from Last 3 Encounters:   17 126/68   17 134/86   17 164/86    Weight from Last 3 Encounters:   17 162 lb (73.5 kg)   17 160 lb (72.6 kg)   17 162 lb (73.5 kg)              Today, you had the following     No orders found for display         Today's Medication Changes          These changes are accurate as of: 3/14/17  8:10 AM.  If you have any questions, ask your nurse or doctor.               Stop taking these medicines if you haven't already. Please contact your care team if you have questions.     atorvastatin 20 MG tablet   Commonly known as:  LIPITOR   Stopped by:  Dorian Cade MD                    Primary Care Provider Office Phone # Fax #    Dorian Cade [phone]       [facility] [address] [place] [address]        Thank you!     Thank you for choosing [facility]  for your care. Our goal is always to provide you with excellent care. Hearing back from our patients is one way we can continue to improve our services. Please take a few minutes to complete the written survey that you may receive in the mail after your visit with us. Thank you!             Your Updated Medication List - Protect others around you: Learn how to safely use, store and throw away your medicines at www.disposemymeds.org.          This list is accurate as of: 3/14/17  8:10 AM.  Always use your most recent med list.                   Brand Name Dispense Instructions for use    albuterol 108 (90 BASE) MCG/ACT  Inhaler    VENTOLIN HFA    18 g    INHALE 2 PUFFS INTO THE LUNGS EVERY 4 HOURS AS NEEDED FOR SHORTNESS OF BREATH / DYSPNEA       AMLODIPINE BESYLATE PO      Take 5 mg by mouth daily       aspirin 81 MG EC tablet     90 tablet    Take 1 tablet (81 mg) by mouth daily       FLUoxetine 20 MG capsule    PROzac    90 capsule    Take 1 capsule (20 mg) by mouth daily       glyBURIDE-metFORMIN 5-500 MG per tablet    GLUCOVANCE    360 tablet    Take 2 tablets by mouth 2 times daily (with meals)       lisinopril 20 MG tablet    PRINIVIL/ZESTRIL    180 tablet    Take 2 tablets (40 mg) by mouth daily       metoprolol 50 MG tablet    LOPRESSOR     Take 50 mg by mouth 2 times daily       NITROGLYCERIN SL      Place 0.4 mg under the tongue every 5 minutes as needed for chest pain       PLAVIX PO      Take 75 mg by mouth daily       SPIRIVA HANDIHALER 18 MCG capsule   Generic drug:  tiotropium     90 capsule    USING THE HANDIHALER, INHALE THE CONTENTS OF ONE CAPSULE DAILY

## 2017-03-14 NOTE — PROGRESS NOTES
SUBJECTIVE:                                                    Bernard Hughes is a 51 year old female who presents to clinic today for the following health issues:      Hospital Follow-up Visit:    Hospital/Nursing Home/IP Rehab [facility]: [facility]  Date of Admission: 3/6/17  Date of Discharge: 3/7/17  Reason(s) for Admission: stent placement            Problems taking medications regularly:  None       Medication changes since discharge: None       Problems adhering to non-medication therapy:  None    Summary of hospitalization:  See outside records, reviewed and scanned  Diagnostic Tests/Treatments reviewed.  Follow up needed: none  Other Healthcare Providers Involved in Patient s Care:         None  Update since discharge: improved.     Post Discharge Medication Reconciliation: discharge medications reconciled and changed, per note/orders (see AVS).  Plan of care communicated with patient and family          They denied her for CABG and instead last week went got two stents.  May need more stents in the future due to other vessels being blocked.  She had some vomiting especially with cold foods.  She can do more, feels the difference in her heart. Doing cardio rehab.      Back to see cardiology in April, will see PA but normally sees Dr Grant.      Other then trouble eating cold foods she is doing really well.    Past Medical History   Diagnosis Date     ASCVD (arteriosclerotic cardiovascular disease)      3 vessel bypass 3/2017     COPD (chronic obstructive pulmonary disease) (H)      Diabetes (H)      Hypertension      Kidney disease      Current Outpatient Prescriptions   Medication     NITROGLYCERIN SL     AMLODIPINE BESYLATE PO     Clopidogrel Bisulfate (PLAVIX PO)     FLUoxetine (PROZAC) 20 MG capsule     atorvastatin (LIPITOR) 40 MG tablet     metoprolol (LOPRESSOR) 50 MG tablet     albuterol (VENTOLIN HFA) 108 (90 BASE) MCG/ACT Inhaler     SPIRIVA HANDIHALER 18 MCG inhalation capsule      glyBURIDE-metFORMIN (GLUCOVANCE) 5-500 MG per tablet     lisinopril (PRINIVIL,ZESTRIL) 20 MG tablet     aspirin 81 MG EC tablet     [DISCONTINUED] metoprolol (LOPRESSOR) 50 MG tablet     [DISCONTINUED] FLUoxetine (PROZAC) 20 MG capsule     [DISCONTINUED] atorvastatin (LIPITOR) 20 MG tablet     No current facility-administered medications for this visit.      Social History   Substance Use Topics     Smoking status: Former Smoker     Smokeless tobacco: Not on file     Alcohol use Not on file     Review of Systems  Constitutional-No fevers, chills, or weight changes..  ENT-No earpain, sore throat, voice changes or rhinitis.  Cardiac-No chest pain or palpitations.  Respiratory-No cough, sob, or hemoptysis.  GI-No nausea, vomitting, diarrhea, constipation, or blood in the stool.  Musculoskeletal-No muscles aches or joint pains.    Physical Exam  /68 (BP Location: Left arm, Patient Position: Chair, Cuff Size: Adult Regular)  Pulse 56  Temp 97.7  F (36.5  C) (Temporal)  Resp 16  Wt 162 lb (73.5 kg)  SpO2 93%  BMI 29.63 kg/m2  General Appearance-healthy, alert, no distress  Cardiac-regular rate and rhythm  with normal S1, S2 ; no murmur, rub or gallops  Lungs-clear to auscultation  Extremities-no peripheral edema, peripheral pulses normal    ASSESSMENT:  Patient with known ascvd that went for cabg but lungs were too bad so had to have stents instead in her LAD and Circumflex.  Now on plavix, amlodipine, nitro prn but not using. Continue her statin, beta blocker and ace.      Doing cardiac rehab and doing very well, much improved.    Chronic lung disease, stopped smoking, on oxygen therapy.    Will check renal function after the dye.  Will slowly add back colder foods.     Electronically signed by Dorian Cade MD      Electronically signed by Dorian Cade MD

## 2017-04-03 ENCOUNTER — TELEPHONE (OUTPATIENT)
Dept: INTERNAL MEDICINE | Facility: CLINIC | Age: 52
End: 2017-04-03

## 2017-04-03 ENCOUNTER — TRANSFERRED RECORDS (OUTPATIENT)
Dept: HEALTH INFORMATION MANAGEMENT | Facility: CLINIC | Age: 52
End: 2017-04-03

## 2017-04-03 NOTE — TELEPHONE ENCOUNTER
Reason for Call:  Same Day Appointment, Requested Provider:  Dorian Cade MD    PCP: Dorian Cade    Reason for visit: Patient had stents placed in her heart. Since then she has had a bad headache. That provider told her that she needs to be seen by her PCP as soon as possible. Can she get worked in sometime tomorrow?     Duration of symptoms: 1 month    Have you been treated for this in the past? No    Additional comments:     Can we leave a detailed message on this number? YES    Phone number patient can be reached at: Home number on file [phone] (home)    Best Time: any    Call taken on 4/3/2017 at 3:38 PM by Brandy Lawson

## 2017-04-05 ENCOUNTER — HOSPITAL ENCOUNTER (OUTPATIENT)
Dept: CT IMAGING | Facility: CLINIC | Age: 52
Discharge: HOME | End: 2017-04-05
Attending: INTERNAL MEDICINE | Admitting: INTERNAL MEDICINE
Payer: COMMERCIAL

## 2017-04-05 ENCOUNTER — OFFICE VISIT (OUTPATIENT)
Dept: INTERNAL MEDICINE | Facility: CLINIC | Age: 52
End: 2017-04-05
Payer: COMMERCIAL

## 2017-04-05 VITALS
BODY MASS INDEX: 30.73 KG/M2 | TEMPERATURE: 98.5 F | SYSTOLIC BLOOD PRESSURE: 158 MMHG | WEIGHT: 168 LBS | DIASTOLIC BLOOD PRESSURE: 76 MMHG | HEART RATE: 58 BPM | OXYGEN SATURATION: 84 % | RESPIRATION RATE: 16 BRPM

## 2017-04-05 DIAGNOSIS — R06.02 SOB (SHORTNESS OF BREATH): Primary | ICD-10-CM

## 2017-04-05 DIAGNOSIS — I10 ESSENTIAL HYPERTENSION WITH GOAL BLOOD PRESSURE LESS THAN 140/90: ICD-10-CM

## 2017-04-05 DIAGNOSIS — M79.89 LEG SWELLING: ICD-10-CM

## 2017-04-05 DIAGNOSIS — R51.9 NONINTRACTABLE HEADACHE, UNSPECIFIED CHRONICITY PATTERN, UNSPECIFIED HEADACHE TYPE: ICD-10-CM

## 2017-04-05 DIAGNOSIS — J44.1 CHRONIC OBSTRUCTIVE PULMONARY DISEASE WITH ACUTE EXACERBATION (H): ICD-10-CM

## 2017-04-05 DIAGNOSIS — I25.10 CORONARY ARTERY DISEASE INVOLVING NATIVE CORONARY ARTERY OF NATIVE HEART, ANGINA PRESENCE UNSPECIFIED: ICD-10-CM

## 2017-04-05 DIAGNOSIS — E11.65 TYPE 2 DIABETES MELLITUS WITH HYPERGLYCEMIA, WITHOUT LONG-TERM CURRENT USE OF INSULIN (H): ICD-10-CM

## 2017-04-05 PROCEDURE — 70450 CT HEAD/BRAIN W/O DYE: CPT

## 2017-04-05 PROCEDURE — 99214 OFFICE O/P EST MOD 30 MIN: CPT | Performed by: INTERNAL MEDICINE

## 2017-04-05 RX ORDER — PREDNISONE 20 MG/1
20 TABLET ORAL DAILY
Qty: 5 TABLET | Refills: 0 | Status: SHIPPED | OUTPATIENT
Start: 2017-04-05 | End: 2017-07-21

## 2017-04-05 ASSESSMENT — PAIN SCALES - GENERAL: PAINLEVEL: EXTREME PAIN (9)

## 2017-04-05 NOTE — PROGRESS NOTES
SUBJECTIVE:                                                    Bernard Hughes is a 51 year old female who presents to clinic today for the following health issues:    Chief Complaint   Patient presents with     Headache     daily headaches     Edema     foot/ankle swelling     Got stents a month, two were done.  No headaches before the stents and just had it the last month.  Headache every morning she wakes up with a headache, sometimes goes away but can last all day, nauseated. Headache is in the back bilateral sometimes up to her eyes.     New medications since the stent are norvasc and plavix.  norvasc can cause swelling as well.  Builds up through the day.            Past Medical History:   Diagnosis Date     ASCVD (arteriosclerotic cardiovascular disease)     3 vessel bypass 3/2017     COPD (chronic obstructive pulmonary disease) (H)      Diabetes (H)      Hypertension      Kidney disease      Current Outpatient Prescriptions   Medication     Acetaminophen (TYLENOL PO)     VENTOLIN  (90 BASE) MCG/ACT Inhaler     AMLODIPINE BESYLATE PO     Clopidogrel Bisulfate (PLAVIX PO)     FLUoxetine (PROZAC) 20 MG capsule     atorvastatin (LIPITOR) 40 MG tablet     metoprolol (LOPRESSOR) 50 MG tablet     SPIRIVA HANDIHALER 18 MCG inhalation capsule     glyBURIDE-metFORMIN (GLUCOVANCE) 5-500 MG per tablet     lisinopril (PRINIVIL,ZESTRIL) 20 MG tablet     aspirin 81 MG EC tablet     NITROGLYCERIN SL     No current facility-administered medications for this visit.      Social History   Substance Use Topics     Smoking status: Former Smoker     Smokeless tobacco: Not on file     Alcohol use Not on file     Review of Systems  Constitutional-No fevers, chills, or weight changes..  Eyes-No blurry or double vision.  ENT-No earpain, sore throat, voice changes or rhinitis.  Cardiac-No chest pain or palpitations.  Respiratory-No cough, sob, or hemoptysis.  GI-No nausea, vomitting, diarrhea, constipation, or blood in the  stool.  Neuro-headaches.    Physical Exam  /76 (BP Location: Left arm, Patient Position: Chair, Cuff Size: Adult Large)  Pulse 58  Temp 98.5  F (36.9  C) (Temporal)  Resp 16  Wt 168 lb (76.2 kg)  SpO2 (!) 84%  BMI 30.73 kg/m2  General Appearance-healthy, alert, no distress  Eyes-conjuctiva clear, PERRL, EOM intact  ENT-ENT exam normal, no neck nodes or sinus tenderness  Cardiac-regular rate and rhythm  with normal S1, S2 ; no murmur, rub or gallops  Lungs-clear to auscultation  GI-Soft, nontender.  Normal bowel sounds.  No hepatosplenomegaly or abnormal masses      ASSESSMENT:  Patient with headaches that come up from the back of the head each morning. She has nausea but no visual changes.  This is a large change since her stents and hospitalization.  I am worried about her headache and starting plavix so will check a head ct today to rule out a bleed. Will then stop her norvasc as that was also started then.  I think the norvasc also affected her legs and the swelling, stopping it should help.       Electronically signed by Dorian Cade MD

## 2017-04-05 NOTE — NURSING NOTE
"Chief Complaint   Patient presents with     Headache     daily headaches     Edema     foot/ankle swelling       Initial There were no vitals taken for this visit. Estimated body mass index is 30.73 kg/(m^2) as calculated from the following:    Height as of 3/1/17: 5' 2\" (1.575 m).    Weight as of this encounter: 168 lb (76.2 kg).  Medication Reconciliation: complete    "

## 2017-04-05 NOTE — MR AVS SNAPSHOT
"              After Visit Summary   4/5/2017    Bernard Hughes    MRN: [id number]           Patient Information     Date Of Birth          1965        Visit Information        Provider Department      4/5/2017 3:30 PM Dorian Cade MD [facility]         Follow-ups after your visit        Your next 10 appointments already scheduled     Apr 05, 2017  3:30 PM CDT   Office Visit with Dorian Cade MD   [facility] ([facility]    [address] [place] [id number]   [phone]           Bring a current list of meds and any records pertaining to this visit.  For Physicals, please bring immunization records and any forms needing to be filled out.  Please arrive 10 minutes early to complete paperwork.              Who to contact     If you have questions or need follow up information about today's clinic visit or your schedule please contact [facility] directly at [phone].  Normal or non-critical lab and imaging results will be communicated to you by MyChart, letter or phone within 4 business days after the clinic has received the results. If you do not hear from us within 7 days, please contact the clinic through [employer] or phone. If you have a critical or abnormal lab result, we will notify you by phone as soon as possible.  Submit refill requests through [employer] or call your pharmacy and they will forward the refill request to us. Please allow 3 business days for your refill to be completed.          Additional Information About Your Visit        [employer] Information     [employer] lets you send messages to your doctor, view your test results, renew your prescriptions, schedule appointments and more. To sign up, go to [email or web address] . Click on \"Log in\" on the left side of the screen, which will take you to the Welcome page. Then click on \"Sign up Now\" on the right side of the page.     You will be asked to enter the access " code listed below, as well as some personal information. Please follow the directions to create your username and password.     Your access code is: [email or web address]  Expires: 2017  8:09 AM     Your access code will  in 90 days. If you need help or a new code, please call your [facility] or [phone].        Care EveryWhere ID     This is your Care EveryWhere ID. This could be used by other organizations to access your [place] medical records  [id number]        Your Vitals Were     Pulse Temperature Respirations Pulse Oximetry BMI (Body Mass Index)       58 98.5  F (36.9  C) (Temporal) 16 84% 30.73 kg/m2        Blood Pressure from Last 3 Encounters:   17 158/76   17 126/68   17 134/86    Weight from Last 3 Encounters:   17 168 lb (76.2 kg)   17 162 lb (73.5 kg)   17 160 lb (72.6 kg)              Today, you had the following     No orders found for display       Primary Care Provider Office Phone # Fax #    Dorian Cade [phone]       [facility] [address] [place] [address]        Thank you!     Thank you for choosing [facility]  for your care. Our goal is always to provide you with excellent care. Hearing back from our patients is one way we can continue to improve our services. Please take a few minutes to complete the written survey that you may receive in the mail after your visit with us. Thank you!             Your Updated Medication List - Protect others around you: Learn how to safely use, store and throw away your medicines at www.disposemymeds.org.          This list is accurate as of: 17  3:25 PM.  Always use your most recent med list.                   Brand Name Dispense Instructions for use    AMLODIPINE BESYLATE PO      Take 5 mg by mouth daily       aspirin 81 MG EC tablet     90 tablet    Take 1 tablet (81 mg) by mouth daily       atorvastatin 40 MG tablet    LIPITOR    90 tablet     Take 1 tablet (40 mg) by mouth daily       FLUoxetine 20 MG capsule    PROzac    90 capsule    Take 1 capsule (20 mg) by mouth daily       glyBURIDE-metFORMIN 5-500 MG per tablet    GLUCOVANCE    360 tablet    Take 2 tablets by mouth 2 times daily (with meals)       lisinopril 20 MG tablet    PRINIVIL/ZESTRIL    180 tablet    Take 2 tablets (40 mg) by mouth daily       metoprolol 50 MG tablet    LOPRESSOR    180 tablet    Take 1 tablet (50 mg) by mouth 2 times daily       NITROGLYCERIN SL      Place 0.4 mg under the tongue every 5 minutes as needed for chest pain       PLAVIX PO      Take 75 mg by mouth daily       SPIRIVA HANDIHALER 18 MCG capsule   Generic drug:  tiotropium     90 capsule    USING THE HANDIHALER, INHALE THE CONTENTS OF ONE CAPSULE DAILY       TYLENOL PO      Take 325 mg by mouth daily as needed for mild pain or fever       VENTOLIN  (90 BASE) MCG/ACT Inhaler   Generic drug:  albuterol     18 g    INHALE 2 PUFFS INTO THE LUNGS EVERY 4 HOURS AS NEEDED FOR SHORTNESS OF BREATH OR DIFFICULTY BREATHING

## 2017-04-23 ENCOUNTER — TRANSFERRED RECORDS (OUTPATIENT)
Dept: HEALTH INFORMATION MANAGEMENT | Facility: CLINIC | Age: 52
End: 2017-04-23

## 2017-04-26 ENCOUNTER — CARE COORDINATION (OUTPATIENT)
Dept: CARE COORDINATION | Facility: CLINIC | Age: 52
End: 2017-04-26

## 2017-04-26 DIAGNOSIS — Z76.89 HEALTH CARE HOME: Primary | ICD-10-CM

## 2017-04-26 NOTE — PROGRESS NOTES
"Clinic Care Coordination Contact  OUTREACH    Referral Information:  Referral Source: CTS  Reason for Contact: hospital follow up        Universal Utilization:      Clinical Concerns:  Current Medical Concerns: Called and spoke with pt, states she is not feeling much better since being discharged and feels she was discharge \"too soon\".  States she continues to cough up green phlegm and is still sob.  They did give her Zithromax to take and has three days left of 20 mg of prednisone.  She has been using her nebulizer every 6 hours and is on 2 liters of oxygen as needed but has been wearing most of the time.  She is wondering about getting in soon for a follow up to make sure she does not end up back in the hospital.  Pt was discharge with home care services.   Current Behavioral Concerns: no current concerns    Education Provided to patient: Advised to complete antibiotics and prednisone as prescribed, use nebulizer every 6 hours for wheezing, plenty of fluids to thin secretions.    Clinical Pathway Name: COPD    Medication Management:  Self management     Functional Status:   Transportation: family     Psychosocial:   Financial/Insurance: not discussed     Resources and Interventions:  Current Resources:    Home Care     Goals: TBD  Frequency of Care Coordination: PRN       Plan:   Pt's appt was moved up to 4/28/17 with PCP  Pt will continue to be followed by home care  RN will outreach to home care to notify of involvement and to let CC RN know when pt is discharged.     Elaina Seaman RN,BSN  Clinical Care Coordinator    [facility] [phone]  [facility] [phone]        "

## 2017-04-27 ENCOUNTER — TELEPHONE (OUTPATIENT)
Dept: INTERNAL MEDICINE | Facility: CLINIC | Age: 52
End: 2017-04-27

## 2017-04-27 NOTE — TELEPHONE ENCOUNTER
Reason for Call: Request for an order or referral:    Order or referral being requested: requesting for verbal okay to admit to homecare on 4-28-17     Date needed: as soon as possible    Has the patient been seen by the PCP for this problem? YES    Additional comments: please contact home care nurse Amirah    Phone number Patient can be reached at:  Other phone number: [phone]  Best Time:  anytime    Can we leave a detailed message on this number?  YES    Call taken on 4/27/2017 at 3:07 PM by Liudmila Wiseman

## 2017-04-28 ENCOUNTER — HOSPITAL ENCOUNTER (OUTPATIENT)
Dept: GENERAL RADIOLOGY | Facility: CLINIC | Age: 52
Discharge: HOME | End: 2017-04-28
Attending: INTERNAL MEDICINE | Admitting: INTERNAL MEDICINE
Payer: COMMERCIAL

## 2017-04-28 ENCOUNTER — OFFICE VISIT (OUTPATIENT)
Dept: INTERNAL MEDICINE | Facility: CLINIC | Age: 52
End: 2017-04-28
Payer: COMMERCIAL

## 2017-04-28 VITALS
OXYGEN SATURATION: 88 % | BODY MASS INDEX: 30 KG/M2 | SYSTOLIC BLOOD PRESSURE: 118 MMHG | DIASTOLIC BLOOD PRESSURE: 64 MMHG | TEMPERATURE: 99 F | RESPIRATION RATE: 16 BRPM | HEART RATE: 76 BPM | WEIGHT: 164 LBS

## 2017-04-28 DIAGNOSIS — J44.1 CHRONIC OBSTRUCTIVE PULMONARY DISEASE WITH ACUTE EXACERBATION (H): ICD-10-CM

## 2017-04-28 DIAGNOSIS — R06.02 SOB (SHORTNESS OF BREATH): ICD-10-CM

## 2017-04-28 DIAGNOSIS — I25.10 CORONARY ARTERY DISEASE INVOLVING NATIVE CORONARY ARTERY OF NATIVE HEART, ANGINA PRESENCE UNSPECIFIED: ICD-10-CM

## 2017-04-28 DIAGNOSIS — J44.1 CHRONIC OBSTRUCTIVE PULMONARY DISEASE WITH ACUTE EXACERBATION (H): Primary | ICD-10-CM

## 2017-04-28 PROCEDURE — 99495 TRANSJ CARE MGMT MOD F2F 14D: CPT | Performed by: INTERNAL MEDICINE

## 2017-04-28 PROCEDURE — 71020 XR CHEST 2 VW: CPT | Mod: TC

## 2017-04-28 RX ORDER — IPRATROPIUM BROMIDE AND ALBUTEROL SULFATE 2.5; .5 MG/3ML; MG/3ML
1 SOLUTION RESPIRATORY (INHALATION) EVERY 4 HOURS PRN
COMMUNITY
End: 2017-06-02

## 2017-04-28 ASSESSMENT — PAIN SCALES - GENERAL: PAINLEVEL: NO PAIN (0)

## 2017-04-28 NOTE — NURSING NOTE
"Chief Complaint   Patient presents with     Hospital F/U       Initial /64 (BP Location: Left arm, Patient Position: Chair, Cuff Size: Adult Regular)  Pulse 76  Temp 99  F (37.2  C) (Temporal)  Resp 16  Wt 164 lb (74.4 kg)  SpO2 (!) 88%  BMI 30 kg/m2 Estimated body mass index is 30 kg/(m^2) as calculated from the following:    Height as of 3/1/17: 5' 2\" (1.575 m).    Weight as of this encounter: 164 lb (74.4 kg).  Medication Reconciliation: complete    "

## 2017-04-28 NOTE — PROGRESS NOTES
SUBJECTIVE:                                                    Bernard Hughes is a 51 year old female who presents to clinic today for the following health issues:          Hospital Follow-up Visit:    Hospital/Nursing Home/IP Rehab Facility: [facility]  Date of Admission: 4/21/17  Date of Discharge: 4/23/17  Reason(s) for Admission: COPD            Problems taking medications regularly:  None       Medication changes since discharge: None       Problems adhering to non-medication therapy:  None    Summary of hospitalization:  Discharge summary unavailable  Diagnostic Tests/Treatments reviewed.  Follow up needed: none  Other Healthcare Providers Involved in Patient s Care:         None  Update since discharge: improved.     Post Discharge Medication Reconciliation: discharge medications reconciled and changed, per note/orders (see AVS).  Plan of care communicated with patient        Was sob and CO 2 retaining and got confused.  She got bipap in the ER and got better, had a cold and bronchitis.  She was there and now doing ok.      Prednisone was 20mg for 5 days.  Feeling like duonebs are helping her.    Had a zpak done.    Heart is doing ok, will see cardiology next week.      PT is going to come to her house and help her stability and walking.      Past Medical History:   Diagnosis Date     ASCVD (arteriosclerotic cardiovascular disease)     3 vessel bypass 3/2017     COPD (chronic obstructive pulmonary disease) (H)      Diabetes (H)      Hypertension      Kidney disease      Current Outpatient Prescriptions   Medication     Azithromycin (ZITHROMAX Z-DIPAK PO)     ipratropium - albuterol 0.5 mg/2.5 mg/3 mL (DUONEB) 0.5-2.5 (3) MG/3ML neb solution     VENTOLIN  (90 BASE) MCG/ACT Inhaler     AMLODIPINE BESYLATE PO     Clopidogrel Bisulfate (PLAVIX PO)     FLUoxetine (PROZAC) 20 MG capsule     atorvastatin (LIPITOR) 40 MG tablet     metoprolol (LOPRESSOR) 50 MG tablet     SPIRIVA HANDIHALER 18 MCG inhalation  capsule     glyBURIDE-metFORMIN (GLUCOVANCE) 5-500 MG per tablet     lisinopril (PRINIVIL,ZESTRIL) 20 MG tablet     aspirin 81 MG EC tablet     Acetaminophen (TYLENOL PO)     predniSONE (DELTASONE) 20 MG tablet     NITROGLYCERIN SL     No current facility-administered medications for this visit.      Social History   Substance Use Topics     Smoking status: Former Smoker     Smokeless tobacco: Not on file     Alcohol use Not on file     Review of Systems  Constitutional-No fevers, chills, or weight changes..  Cardiac-No chest pain or palpitations.  Respiratory-SOB.chronic  GI-No nausea, vomitting, diarrhea, constipation, or blood in the stool.  Musculoskeletal-No muscles aches or joint pains.  Neuro-tremors at times.      Physical Exam  /64 (BP Location: Left arm, Patient Position: Chair, Cuff Size: Adult Regular)  Pulse 76  Temp 99  F (37.2  C) (Temporal)  Resp 16  Wt 164 lb (74.4 kg)  SpO2 (!) 88%  BMI 30 kg/m2  General Appearance-alert, mild distress  Cardiac-regular rate and rhythm  with normal S1, S2 ; no murmur, rub or gallops  Lungs-normal breath sounds in the upper lobes, decreased breath sounds bilateral lungs.      ASSESSMENT:  Patient has had a difficult winter with cardiac disease and received stents in March. Unfortunately she had another exacerbation of COPD after being by someone with an upper respiratory infection. She was hospitalized, placed on BiPAP, but has done well. She is now at home is very happy with the DuoNeb's feels those help her a lot. It may be the prednisone helping her more.  We'll continue the DuoNeb's along with her Spiriva and her prednisone will stop as we'll do Zithromax. Due to her decreased breath sounds in the lower lungs and winter check a chest x-ray to make sure should not developing pleural effusions    Electronically signed by Dorian Cade MD      Electronically signed by Dorian Cade MD

## 2017-04-28 NOTE — MR AVS SNAPSHOT
"              After Visit Summary   2017    Bernard Hughes    MRN: [id number]           Patient Information     Date Of Birth          1965        Visit Information        Provider Department      2017 3:15 PM Dorian Cade MD [facility]         Follow-ups after your visit        Who to contact     If you have questions or need follow up information about today's clinic visit or your schedule please contact [facility] directly at [phone].  Normal or non-critical lab and imaging results will be communicated to you by [employer], letter or phone within 4 business days after the clinic has received the results. If you do not hear from us within 7 days, please contact the clinic through [employer] or phone. If you have a critical or abnormal lab result, we will notify you by phone as soon as possible.  Submit refill requests through [employer] or call your pharmacy and they will forward the refill request to us. Please allow 3 business days for your refill to be completed.          Additional Information About Your Visit        [employer] Information     [employer] lets you send messages to your doctor, view your test results, renew your prescriptions, schedule appointments and more. To sign up, go to [email or web address] . Click on \"Log in\" on the left side of the screen, which will take you to the Welcome page. Then click on \"Sign up Now\" on the right side of the page.     You will be asked to enter the access code listed below, as well as some personal information. Please follow the directions to create your username and password.     Your access code is: [email or web address]  Expires: 2017  8:09 AM     Your access code will  in 90 days. If you need help or a new code, please call your [facility] or [phone].        Care EveryWhere ID     This is your Care EveryWhere ID. This could be used by other organizations to access your [place] medical " records  [id number]        Your Vitals Were     Pulse Temperature Respirations Pulse Oximetry BMI (Body Mass Index)       76 99  F (37.2  C) (Temporal) 16 88% 30 kg/m2        Blood Pressure from Last 3 Encounters:   04/28/17 118/64   04/05/17 158/76   03/14/17 126/68    Weight from Last 3 Encounters:   04/28/17 164 lb (74.4 kg)   04/05/17 168 lb (76.2 kg)   03/14/17 162 lb (73.5 kg)              Today, you had the following     No orders found for display       Primary Care Provider Office Phone # Fax #    Dorian Cade [phone]       [facility] [address] [place] [address]        Thank you!     Thank you for choosing [facility]  for your care. Our goal is always to provide you with excellent care. Hearing back from our patients is one way we can continue to improve our services. Please take a few minutes to complete the written survey that you may receive in the mail after your visit with us. Thank you!             Your Updated Medication List - Protect others around you: Learn how to safely use, store and throw away your medicines at www.disposemymeds.org.          This list is accurate as of: 4/28/17  3:22 PM.  Always use your most recent med list.                   Brand Name Dispense Instructions for use    AMLODIPINE BESYLATE PO      Take 5 mg by mouth daily       aspirin 81 MG EC tablet     90 tablet    Take 1 tablet (81 mg) by mouth daily       atorvastatin 40 MG tablet    LIPITOR    90 tablet    Take 1 tablet (40 mg) by mouth daily       FLUoxetine 20 MG capsule    PROzac    90 capsule    Take 1 capsule (20 mg) by mouth daily       glyBURIDE-metFORMIN 5-500 MG per tablet    GLUCOVANCE    360 tablet    Take 2 tablets by mouth 2 times daily (with meals)       ipratropium - albuterol 0.5 mg/2.5 mg/3 mL 0.5-2.5 (3) MG/3ML neb solution    DUONEB     Take 1 vial by nebulization every 4 hours as needed for shortness of breath / dyspnea or wheezing        lisinopril 20 MG tablet    PRINIVIL/ZESTRIL    180 tablet    Take 2 tablets (40 mg) by mouth daily       metoprolol 50 MG tablet    LOPRESSOR    180 tablet    Take 1 tablet (50 mg) by mouth 2 times daily       NITROGLYCERIN SL      Place 0.4 mg under the tongue every 5 minutes as needed for chest pain       PLAVIX PO      Take 75 mg by mouth daily       predniSONE 20 MG tablet    DELTASONE    5 tablet    Take 1 tablet (20 mg) by mouth daily       SPIRIVA HANDIHALER 18 MCG capsule   Generic drug:  tiotropium     90 capsule    USING THE HANDIHALER, INHALE THE CONTENTS OF ONE CAPSULE DAILY       TYLENOL PO      Take 325 mg by mouth daily as needed for mild pain or fever       VENTOLIN  (90 BASE) MCG/ACT Inhaler   Generic drug:  albuterol     18 g    INHALE 2 PUFFS INTO THE LUNGS EVERY 4 HOURS AS NEEDED FOR SHORTNESS OF BREATH OR DIFFICULTY BREATHING       ZITHROMAX Z-DIPAK PO      Take 250 mg by mouth daily

## 2017-04-28 NOTE — PROGRESS NOTES
Clinic Care Coordination Contact  Care Team Conversations    Called and left a message for Amirah HARRIS at Home care to return my call with CM information.     Elaina Seaman RN,BSN  Clinical Care Coordinator    [facility] [phone]  [facility] [phone]

## 2017-05-01 ENCOUNTER — TELEPHONE (OUTPATIENT)
Dept: FAMILY MEDICINE | Facility: CLINIC | Age: 52
End: 2017-05-01

## 2017-05-01 DIAGNOSIS — F43.21 ADJUSTMENT DISORDER WITH DEPRESSED MOOD: Primary | ICD-10-CM

## 2017-05-01 NOTE — TELEPHONE ENCOUNTER
Reason for Call:  Home Health Care    Lorri with [facility] called regarding (reason for call): orders   Orders are needed for this patient. Verbal orders      PT:  twice a week   OT:  evaluation     Skilled Nursing: evaluation for advanced care plan   Pt Provider: Rayo     Phone Number Homecare Nurse can be reached at:      Can we leave a detailed message on this number? YES    Phone number patient can be reached at: Other phone number:    Best Time: any     Call taken on 5/1/2017 at 11:26 AM by Viki Hughes

## 2017-05-02 ENCOUNTER — TELEPHONE (OUTPATIENT)
Dept: INTERNAL MEDICINE | Facility: CLINIC | Age: 52
End: 2017-05-02

## 2017-05-02 RX ORDER — FLUOXETINE 40 MG/1
40 CAPSULE ORAL DAILY
Qty: 90 CAPSULE | Refills: 3 | Status: SHIPPED | OUTPATIENT
Start: 2017-05-02 | End: 2018-03-20

## 2017-05-02 NOTE — TELEPHONE ENCOUNTER
Reason for Call: Request for an order or referral:  Orders    Order or referral being requested:    blayne due to depression    Date needed: as soon as possible    Has the patient been seen by the PCP for this problem? YES    Additional comments: NA    Phone number Patient can be reached at:  [phone]    Best Time:  any    Can we leave a detailed message on this number?  YES    Call taken on 5/2/2017 at 10:25 AM by Annamarie Tan

## 2017-05-02 NOTE — TELEPHONE ENCOUNTER
Called patient and now overly depressed, can do social work evaluation for other resources for her.

## 2017-05-03 ENCOUNTER — TRANSFERRED RECORDS (OUTPATIENT)
Dept: HEALTH INFORMATION MANAGEMENT | Facility: CLINIC | Age: 52
End: 2017-05-03

## 2017-05-04 ENCOUNTER — TELEPHONE (OUTPATIENT)
Dept: INTERNAL MEDICINE | Facility: CLINIC | Age: 52
End: 2017-05-04

## 2017-05-04 NOTE — TELEPHONE ENCOUNTER
Reason for Call:  Home Health Care    Jeanine with [facility] called regarding (reason for call): orders    Orders are needed for this patient.     PT:     OT:     Skilled Nursinx wk for 2 wks. Also has questions regarding pts medications. Please call. Stated it would be easier to discuss directly with nurse.    Pt Provider: PL    Phone Number Homecare Nurse can be reached at:     Can we leave a detailed message on this number? YES    Phone number patient can be reached at:     Best Time: anytime    Call taken on 2017 at 2:33 PM by Tamar Davis

## 2017-05-05 NOTE — TELEPHONE ENCOUNTER
Jeanine informed of ok'd orders.  Nurse has questions about two of her medications.  Amlodipine and Propranolol, should she be taking them.  They are on the list but pt doesn't have them in the home and hasn't been taking them.

## 2017-06-02 ENCOUNTER — TELEPHONE (OUTPATIENT)
Dept: INTERNAL MEDICINE | Facility: CLINIC | Age: 52
End: 2017-06-02

## 2017-06-02 DIAGNOSIS — R06.02 SOB (SHORTNESS OF BREATH): ICD-10-CM

## 2017-06-02 DIAGNOSIS — J44.1 CHRONIC OBSTRUCTIVE PULMONARY DISEASE WITH ACUTE EXACERBATION (H): ICD-10-CM

## 2017-06-02 RX ORDER — IPRATROPIUM BROMIDE AND ALBUTEROL SULFATE 2.5; .5 MG/3ML; MG/3ML
1 SOLUTION RESPIRATORY (INHALATION) EVERY 4 HOURS PRN
Qty: 360 ML | Refills: 1 | Status: SHIPPED | OUTPATIENT
Start: 2017-06-02 | End: 2017-09-24

## 2017-06-02 NOTE — TELEPHONE ENCOUNTER
Reason for Call:  Medication or medication refill:    Do you use a [facility]?  Name of the pharmacy and phone number for the current request:  [facility] - [phone]    Name of the medication requested: ipratropium - albuterol 0.5 mg/2.5 mg/3 mL (DUONEB) 0.5-2.5 (3) MG/3ML neb solution    Other request: patient states she got this filled in [facility] last but Dr. Cade knows about this. Requesting a refill     Can we leave a detailed message on this number? YES    Phone number patient can be reached at: Home number on file [phone] (home)    Best Time: any    Call taken on 6/2/2017 at 8:14 AM by Leigh Hallman

## 2017-06-02 NOTE — TELEPHONE ENCOUNTER
duoneb       Last Written Prescription Date: pt reports  Last Fill Quantity: , # refills:     Last Office Visit with [facility], [facility] or [facility] prescribing provider:  4/28/17   Future Office Visit:       Date of Last Asthma Action Plan Letter:   There are no preventive care reminders to display for this patient.   Asthma Control Test: No flowsheet data found.    Date of Last Spirometry Test:   No results found for this or any previous visit.

## 2017-06-22 ENCOUNTER — TELEPHONE (OUTPATIENT)
Dept: INTERNAL MEDICINE | Facility: CLINIC | Age: 52
End: 2017-06-22

## 2017-06-22 DIAGNOSIS — E11.9 DIABETES MELLITUS, TYPE 2 (H): Primary | ICD-10-CM

## 2017-06-22 NOTE — TELEPHONE ENCOUNTER
Reason for call:  Patient reporting a symptom    Symptom or request: blood sugar levels     Duration (how long have symptoms been present): a few days    Have you been treated for this before? Yes    Additional comments: patient states that for the past few days she has been having blood sugar levels that have been dropping. Patient states she would like to speak with a triage nurse about these symptoms, and what she can do. Patient has appt with Dr. Cade on 6-30-17    Phone Number patient can be reached at:  Other phone number:  Cell PHONE # [phone]    Best Time:  anytime    Can we leave a detailed message on this number:  YES    Call taken on 6/22/2017 at 11:30 AM by Liudmila Wiseman

## 2017-06-22 NOTE — TELEPHONE ENCOUNTER
"Bernard Hughes is a 51 year old female who calls with concerns about low blood sugars.  Patient reports that the past few days she had had problems with her blood sugars \"bottoming out.\"  Patient reports that last night blood sugar was 36, then was 212 this morning.  Patient is taking Glucovance 2 tablets twice daily, and she is wondering if this should be adjusted.  Patient reports that recently she has had some hospital stays and was taking prednisone, she is concerned that this has affected her blood sugars, but now that she is feeling better her medication needs to be change.  Patient reports that she feels great other then the blood sugars.  Patient reports that she does not check blood sugars often due to cost, but does check when she is not feeling well.  She reports that she has recently lost weight and is eating much better due to recent heart issues.  Patient does have an appointment next week, but is wondering if she should have labs or make medication changes prior to appointment.  Patient is aware the PCP is currently out of office.         NURSING ASSESSMENT:  Description:  Blood sugar fluctuations.   Onset/duration:  Past few days.   Associated symptoms:  Reports feeling good.   Pain scale (0-10)   0/10  Last exam/Treatment:  04/28/2017  Allergies: No Known Allergies    NURSING PLAN: Routed to provider Yes    RECOMMENDED DISPOSITION:  Patient will be seen next week by PCP, she is wondering if she should make changes to meds prior or to have labs collected prior to appointment with PCP.   Will comply with recommendation: Yes  If further questions/concerns or if symptoms do not improve, worsen or new symptoms develop, call your PCP or [facility] as soon as possible.    Guideline used:  Diabetes   Telephone Triage Protocols for Nurses, Fifth Edition, Reena Salas RN  "

## 2017-06-23 NOTE — TELEPHONE ENCOUNTER
RN TRIAGE CALL:    Patient Contact    Attempt # 1    Was call answered?  No.  Left message on voicemail with information to call me back.

## 2017-06-23 NOTE — TELEPHONE ENCOUNTER
She can go to 2 glucovance in the AM and 1 at the PM.    Please order a hgba1c and comprehensive panel for her if not done int he last 3 months

## 2017-06-23 NOTE — TELEPHONE ENCOUNTER
Message given to Aridana. Verbalized understanding. She has a lab appt next week.  Aleah Hughes RN

## 2017-07-05 DIAGNOSIS — E11.65 TYPE 2 DIABETES MELLITUS WITH HYPERGLYCEMIA, WITHOUT LONG-TERM CURRENT USE OF INSULIN (H): Primary | ICD-10-CM

## 2017-07-05 NOTE — TELEPHONE ENCOUNTER
Routing refill request to provider for review/approval because:  Labs not current:  Microalbumin    Jennifer Salas RN

## 2017-07-05 NOTE — TELEPHONE ENCOUNTER
GLYBURIDE-METFORMIN 5-500MG TABS         Last Written Prescription Date: 5/31/2016  Last Fill Quantity: 360, # refills: 3  Last Office Visit with G, P or [facility] prescribing provider:  4/28/2017   Next 5 appointments (look out 90 days)     Jul 07, 2017  9:00 AM CDT   Office Visit with Ezekiel Desai RPH   [facility] ([facility])    [address] [place] [address]   [phone]            Jul 11, 2017 11:00 AM CDT   Office Visit with Dorian Cade MD   [facility] ([facility]    [address] [facility] [address]   [phone]                   BP Readings from Last 3 Encounters:   04/28/17 118/64   04/05/17 158/76   03/14/17 126/68     No results found for: MICROL  No results found for: UMALCR  Creatinine   Date Value Ref Range Status   03/14/2017 0.80 0.52 - 1.04 mg/dL Final   ]  GFR Estimate   Date Value Ref Range Status   03/14/2017 75 >60 mL/min/1.7m2 Final     Comment:     Non  GFR Calc   02/21/2017 88 >60 mL/min/1.7m2 Final     Comment:     Non  GFR Calc   05/31/2016 83 >60 mL/min/1.7m2 Final     Comment:     Non  GFR Calc     GFR Estimate If Black   Date Value Ref Range Status   03/14/2017 >90   GFR Calc   >60 mL/min/1.7m2 Final   02/21/2017 >90   GFR Calc   >60 mL/min/1.7m2 Final   05/31/2016 >90   GFR Calc   >60 mL/min/1.7m2 Final     Lab Results   Component Value Date    CHOL 164 05/31/2016     Lab Results   Component Value Date    HDL 55 05/31/2016     Lab Results   Component Value Date    LDL 89 05/31/2016     Lab Results   Component Value Date    TRIG 101 05/31/2016     No results found for: CHOLHDLRATIO  Lab Results   Component Value Date    AST 12 02/21/2017     Lab Results   Component Value Date    ALT 29 02/21/2017     Lab Results   Component Value Date    A1C 7.3 02/21/2017    A1C 6.6 05/31/2016    A1C  6.6 10/26/2015     Potassium   Date Value Ref Range Status   03/14/2017 4.4 3.4 - 5.3 mmol/L Final

## 2017-07-06 RX ORDER — GLYBURIDE-METFORMIN HYDROCHLORIDE 5; 500 MG/1; MG/1
TABLET ORAL
Qty: 360 TABLET | Refills: 3 | Status: SHIPPED | OUTPATIENT
Start: 2017-07-06 | End: 2017-07-26

## 2017-07-21 ENCOUNTER — ALLIED HEALTH/NURSE VISIT (OUTPATIENT)
Dept: PHARMACY | Facility: CLINIC | Age: 52
End: 2017-07-21
Payer: COMMERCIAL

## 2017-07-21 DIAGNOSIS — J44.9 CHRONIC OBSTRUCTIVE PULMONARY DISEASE, UNSPECIFIED COPD TYPE (H): ICD-10-CM

## 2017-07-21 DIAGNOSIS — I25.10 CORONARY ARTERY DISEASE INVOLVING NATIVE CORONARY ARTERY OF NATIVE HEART, ANGINA PRESENCE UNSPECIFIED: ICD-10-CM

## 2017-07-21 DIAGNOSIS — F43.21 ADJUSTMENT DISORDER WITH DEPRESSED MOOD: ICD-10-CM

## 2017-07-21 DIAGNOSIS — E78.5 HYPERLIPIDEMIA LDL GOAL <100: ICD-10-CM

## 2017-07-21 DIAGNOSIS — I10 ESSENTIAL HYPERTENSION WITH GOAL BLOOD PRESSURE LESS THAN 140/90: ICD-10-CM

## 2017-07-21 DIAGNOSIS — E11.65 TYPE 2 DIABETES MELLITUS WITH HYPERGLYCEMIA, WITHOUT LONG-TERM CURRENT USE OF INSULIN (H): Primary | ICD-10-CM

## 2017-07-21 PROCEDURE — 99605 MTMS BY PHARM NP 15 MIN: CPT | Mod: U4 | Performed by: PHARMACIST

## 2017-07-21 PROCEDURE — 99607 MTMS BY PHARM ADDL 15 MIN: CPT | Mod: U4 | Performed by: PHARMACIST

## 2017-07-21 NOTE — MR AVS SNAPSHOT
After Visit Summary   7/21/2017    Bernard Hughes    MRN: [id number]           Patient Information     Date Of Birth          1965        Visit Information        Provider Department      7/21/2017 8:00 AM Ezekiel Desai, [facility] MTM        Care Instructions    Recommendations from today's MTM visit:                                                    MTM (medication therapy management) is a service provided by a clinical pharmacist designed to help you get the most of out of your medicines.   Today we reviewed what your medicines are for, how to know if they are working, that your medicines are safe and how to make your medicine regimen as easy as possible.     1. You may benefit from switching your Spiriva Handihaler to different version called the Spiriva Respimat.  This may require some training to learn how to use a different inhaler, but you could avoid the issues you are having with the Spiriva capsules.    Next MTM visit: 3-6 months or sooner if needed    To schedule another MTM appointment, please call the clinic directly or you may call the MTM scheduling line at [phone] or toll-free at [phone].     My Clinical Pharmacist's contact information:                                                      It was a pleasure seeing you today!  Please feel free to contact me with any questions or concerns you have.      Brent Desai, Gracie  Medication Therapy Management Provider  Pager (voicemail): [phone]    You may receive a survey about the MTM services you received.  I would appreciate your feedback to help me serve you better in the future. Please fill it out and return it when you can. Your comments will be anonymous.                Follow-ups after your visit        Your next 10 appointments already scheduled     Jul 26, 2017  8:00 AM CDT   Office Visit with Dorian Cade MD   [facility] ([address]  "[place]  [facility] [address] [phone]           Bring a current list of meds and any records pertaining to this visit.  For Physicals, please bring immunization records and any forms needing to be filled out.  Please arrive 10 minutes early to complete paperwork.              Who to contact     If you have questions or need follow up information about today's clinic visit or your schedule please contact [facility] MTM directly at [phone].  Normal or non-critical lab and imaging results will be communicated to you by SnapRetailhart, letter or phone within 4 business days after the clinic has received the results. If you do not hear from us within 7 days, please contact the clinic through SnapRetailhart or phone. If you have a critical or abnormal lab result, we will notify you by phone as soon as possible.  Submit refill requests through Xceligent or call your pharmacy and they will forward the refill request to us. Please allow 3 business days for your refill to be completed.          Additional Information About Your Visit        [facility] Information     Xceligent lets you send messages to your doctor, view your test results, renew your prescriptions, schedule appointments and more. To sign up, go to [email or web address] . Click on \"Log in\" on the left side of the screen, which will take you to the Welcome page. Then click on \"Sign up Now\" on the right side of the page.     You will be asked to enter the access code listed below, as well as some personal information. Please follow the directions to create your username and password.     Your access code is: [email or web address]  Expires: 10/19/2017  2:07 PM     Your access code will  in 90 days. If you need help or a new code, please call your [facility] clinic or [phone].        Care EveryWhere ID     This is your Care EveryWhere ID. This could be used by other organizations to access your [facility] medical records  [id number]         Blood Pressure " from Last 3 Encounters:   04/28/17 118/64   04/05/17 158/76   03/14/17 126/68    Weight from Last 3 Encounters:   04/28/17 164 lb (74.4 kg)   04/05/17 168 lb (76.2 kg)   03/14/17 162 lb (73.5 kg)              Today, you had the following     No orders found for display       Primary Care Provider Office Phone # Fax #    Dorian Cade [phone]       [facility] [address] [place] [address]        Equal Access to Services     [facility]: Hadii aad ku hadasho Soomaali, waaxda luqadaha, qaybta kaalmada adeegyada, hermes scanlon . So [facility] [phone].    ATENCIÓN: Si habla español, tiene a hitchcock disposición servicios gratuitos de asistencia lingüística. [facility] [phone].    We comply with applicable federal civil rights laws and [place] laws. We do not discriminate on the basis of race, color, national origin, age, disability sex, sexual orientation or gender identity.            Thank you!     Thank you for choosing [facility]  for your care. Our goal is always to provide you with excellent care. Hearing back from our patients is one way we can continue to improve our services. Please take a few minutes to complete the written survey that you may receive in the mail after your visit with us. Thank you!             Your Updated Medication List - Protect others around you: Learn how to safely use, store and throw away your medicines at www.disposemymeds.org.          This list is accurate as of: 7/21/17  2:07 PM.  Always use your most recent med list.                   Brand Name Dispense Instructions for use Diagnosis    aspirin 81 MG EC tablet     90 tablet    Take 1 tablet (81 mg) by mouth daily    Type 2 diabetes, HbA1C goal < 8% (H), Hypertension goal BP (blood pressure) < 140/90       atorvastatin 40 MG tablet    LIPITOR    90 tablet    Take 1 tablet (40 mg) by mouth daily    Coronary artery disease involving native  coronary artery of native heart, angina presence unspecified       FLUoxetine 40 MG capsule    PROzac    90 capsule    Take 1 capsule (40 mg) by mouth daily    Adjustment disorder with depressed mood       glyBURIDE-metFORMIN 5-500 MG per tablet    GLUCOVANCE    360 tablet    TAKE TWO TABLETS BY MOUTH TWICE A DAY WITH MEALS    Type 2 diabetes mellitus with hyperglycemia, without long-term current use of insulin (H)       ipratropium - albuterol 0.5 mg/2.5 mg/3 mL 0.5-2.5 (3) MG/3ML neb solution    DUONEB    360 mL    Take 1 vial (3 mLs) by nebulization every 4 hours as needed for shortness of breath / dyspnea or wheezing    Chronic obstructive pulmonary disease with acute exacerbation (H), SOB (shortness of breath)       lisinopril 20 MG tablet    PRINIVIL/ZESTRIL    180 tablet    Take 2 tablets (40 mg) by mouth daily    Essential hypertension with goal blood pressure less than 140/90       metoprolol 50 MG tablet    LOPRESSOR    180 tablet    Take 1 tablet (50 mg) by mouth 2 times daily    Essential hypertension with goal blood pressure less than 140/90       NITROGLYCERIN SL      Place 0.4 mg under the tongue every 5 minutes as needed for chest pain        PLAVIX PO      Take 75 mg by mouth daily        SPIRIVA HANDIHALER 18 MCG capsule   Generic drug:  tiotropium     90 capsule    USING THE HANDIHALER, INHALE THE CONTENTS OF ONE CAPSULE DAILY    Chronic obstructive pulmonary disease, unspecified COPD type (H)       TYLENOL PO      Take 325 mg by mouth daily as needed for mild pain or fever        VENTOLIN  (90 BASE) MCG/ACT Inhaler   Generic drug:  albuterol     18 g    INHALE 2 PUFFS INTO THE LUNGS EVERY 4 HOURS AS NEEDED FOR SHORTNESS OF BREATH OR DIFFICULTY BREATHING    Chronic obstructive pulmonary disease, unspecified COPD type (H)

## 2017-07-21 NOTE — Clinical Note
FYI - may benefit from switch to Spiriva Respimat given issues using Handihaler.  Brent Desai, RupaD Medication Therapy Management Provider Pager (voicemail): [phone]

## 2017-07-21 NOTE — PROGRESS NOTES
"SUBJECTIVE/OBJECTIVE:                           Bernard Hughes is a 51 year old female called for an initial visit for Medication Therapy Management.  She was referred to me from her "[employer] insurance plan.     Chief Complaint: Comprehensive Medication Review/Wonders if something is causing a headache    Allergies/ADRs: Reviewed in Epic  Tobacco: History of tobacco dependence - quit 2+ years ago   Alcohol: none  Caffeine: 2-3 cups/day of coffee  Activity: \"Someday are better than others\" - patient is doing cardiac rehab.   PMH: Reviewed in Epic    Medication Adherence: no issues reported and sets up own med boxes    Diabetes:  Pt currently taking glyburide-metformin 5-500 mg - two tablets twice daily. Pt is not experiencing side effects. Does complain of headaches that still come overnight - sometimes with use acetaminophen to varying results. Has Diabetes visit with PCP next week.   SMBG: one-two times daily.   Ranges (patient reported): 120 mg/dL  Patient is experiencing hypoglycemia. Frequency of hypoglycemia? Monthly - typically overnight. Symptoms of low blood sugar? weak, sweaty.   Recent symptoms of high blood sugar? none  Eye exam: due  Foot exam: due  Microalbumin is not on file. Pt is taking an ACEi/ARB.  Aspirin: Taking 81mg daily and denies side effects    COPD: Current medications: Spiriva Handihaler 18 mcg daily, DuoNebs PRN (0-4 times daily), and albuterol HFA PRN (0-5 times per day depending on the weather). She does not use albuterol and Duonebs at the same time. She does not like the capsules with her Spiriva as she was told not to touch them after using device.  Prefers to try something different.  Pt is not experiencing side effects.   Pt reports the following symptoms: increased need of albuterol due to humidity  Pt does not have an COPD Action Plan on file.   Has spirometry been completed: Yes     CAD/HTN: Current medications include clopidogrel 75 mg daily, aspirin 81 mg daily, " metoprolol tartrate 50 mg twice daily, lisinopril 40 mg daily, and Nitrostat SL PRN (has not needed). She stopped amlodipine in April due to thoughts it may be contributing to headaches (which have continued to bother patient) and leg swelling.   Patient reports no current medication side effects.     Patient does not self-monitor BP but it has been about 110/70 mmHg at cardiac rehab.     Depression:  Current medications include: Fluoxetine 40 mg once daily. Pt reports that depression symptoms are improved.  PHQ-9 SCORE 12/16/2015 5/31/2016   Total Score 18 14     Hyperlipidemia: Current therapy includes atorvastatin 40 mg once daily.  Pt reports no significant myalgias or other side effects.   The 10-year ASCVD risk score (Jocelynnlisa PADILLA Jr, et al., 2013) is: 2.3%    Values used to calculate the score:      Age: 51 years      Sex: Female      Is Non- : No      Diabetic: Yes      Tobacco smoker: No      Systolic Blood Pressure: 118 mmHg      Is BP treated: Yes      HDL Cholesterol: 55 mg/dL      Total Cholesterol: 164 mg/dL    Current labs include:BP Readings from Last 3 Encounters:   04/28/17 118/64   04/05/17 158/76   03/14/17 126/68     Today's Vitals: There were no vitals taken for this visit. - no vitals, telephone encounter  Lab Results   Component Value Date    A1C 7.3 02/21/2017   .  Lab Results   Component Value Date    CHOL 164 05/31/2016     Lab Results   Component Value Date    TRIG 101 05/31/2016     Lab Results   Component Value Date    HDL 55 05/31/2016     Lab Results   Component Value Date    LDL 89 05/31/2016       Liver Function Studies -   Recent Labs   Lab Test  02/21/17   0741   PROTTOTAL  6.6*   ALBUMIN  2.8*   BILITOTAL  0.4   ALKPHOS  167*   AST  12   ALT  29     Last Basic Metabolic Panel:  Lab Results   Component Value Date     03/14/2017      Lab Results   Component Value Date    POTASSIUM 4.4 03/14/2017     Lab Results   Component Value Date    CHLORIDE 101  2017     Lab Results   Component Value Date    BUN 14 2017     Lab Results   Component Value Date    CR 0.80 2017     GFR Estimate   Date Value Ref Range Status   2017 75 >60 mL/min/1.7m2 Final     Comment:     Non  GFR Calc   2017 88 >60 mL/min/1.7m2 Final     Comment:     Non  GFR Calc   2016 83 >60 mL/min/1.7m2 Final     Comment:     Non  GFR Calc     TSH   Date Value Ref Range Status   2015 0.48 0.40 - 4.00 mU/L Final     Most Recent Immunizations   Administered Date(s) Administered     Influenza Vaccine IM 3yrs+ 4 Valent IIV4 10/26/2015       ASSESSMENT:                             Current medications were reviewed today.     Medication Adherence: no issues identified    Diabetes: Needs Improvement. Patient is meeting A1c goal of < 8%. Self monitoring of blood glucose is at goal of fasting  mg/dL. Labs scheduled for upcoming PCP visit.     COPD: Stable, but room for improvement based upon patient preference. Patient would benefit from possibly switching to Respimat device given her desire to get away from Handihaler.    CAD/HTN: Stable. Pt is meeting BP goal of <140/90 mmHg.     Depression: Stable per patient.    Hyperlipidemia: Stable. Pt is on high intensity statin which is indicated based on 2013 ACC/AHA guidelines for lipid management.      PLAN:                            Will send plan to Dr. Cade for consideration of the followin. Consider switching Spiriva Handihaler to Spiriva Respimat.  2. Labs planned for PCP visit    I spent 45 minutes with this patient today. A copy of the visit note was provided to the patient's primary care provider.    Will follow up in 3 months or sooner if needed.    The patient was mailed a summary of these recommendations as an after visit summary.     Brent Desai, Gracie  Medication Therapy Management Provider  Pager (voicemail): [phone]

## 2017-07-21 NOTE — PATIENT INSTRUCTIONS
Recommendations from today's MTM visit:                                                    MTM (medication therapy management) is a service provided by a clinical pharmacist designed to help you get the most of out of your medicines.   Today we reviewed what your medicines are for, how to know if they are working, that your medicines are safe and how to make your medicine regimen as easy as possible.     1. You may benefit from switching your Spiriva Handihaler to different version called the Spiriva Respimat.  This may require some training to learn how to use a different inhaler, but you could avoid the issues you are having with the Spiriva capsules.    Next MTM visit: 3-6 months or sooner if needed    To schedule another MTM appointment, please call the clinic directly or you may call the MTM scheduling line at [phone] or toll-free at [phone].     My Clinical Pharmacist's contact information:                                                      It was a pleasure seeing you today!  Please feel free to contact me with any questions or concerns you have.      Brent Desai, RupaD  Medication Therapy Management Provider  Pager (voicemail): [phone]    You may receive a survey about the MTM services you received.  I would appreciate your feedback to help me serve you better in the future. Please fill it out and return it when you can. Your comments will be anonymous.

## 2017-07-26 ENCOUNTER — OFFICE VISIT (OUTPATIENT)
Dept: INTERNAL MEDICINE | Facility: CLINIC | Age: 52
End: 2017-07-26
Payer: COMMERCIAL

## 2017-07-26 VITALS
RESPIRATION RATE: 16 BRPM | TEMPERATURE: 98.1 F | DIASTOLIC BLOOD PRESSURE: 86 MMHG | HEART RATE: 74 BPM | SYSTOLIC BLOOD PRESSURE: 136 MMHG | BODY MASS INDEX: 31.83 KG/M2 | WEIGHT: 174 LBS | OXYGEN SATURATION: 92 %

## 2017-07-26 DIAGNOSIS — Z23 NEED FOR TDAP VACCINATION: ICD-10-CM

## 2017-07-26 DIAGNOSIS — E11.65 TYPE 2 DIABETES MELLITUS WITH HYPERGLYCEMIA, WITHOUT LONG-TERM CURRENT USE OF INSULIN (H): Primary | ICD-10-CM

## 2017-07-26 DIAGNOSIS — I25.10 CORONARY ARTERY DISEASE INVOLVING NATIVE CORONARY ARTERY OF NATIVE HEART, ANGINA PRESENCE UNSPECIFIED: ICD-10-CM

## 2017-07-26 DIAGNOSIS — J44.9 CHRONIC OBSTRUCTIVE PULMONARY DISEASE, UNSPECIFIED COPD TYPE (H): ICD-10-CM

## 2017-07-26 DIAGNOSIS — F33.9 RECURRENT MAJOR DEPRESSIVE DISORDER, REMISSION STATUS UNSPECIFIED (H): ICD-10-CM

## 2017-07-26 DIAGNOSIS — I10 ESSENTIAL HYPERTENSION WITH GOAL BLOOD PRESSURE LESS THAN 140/90: ICD-10-CM

## 2017-07-26 DIAGNOSIS — E78.5 HYPERLIPIDEMIA LDL GOAL <100: ICD-10-CM

## 2017-07-26 DIAGNOSIS — Z23 NEED FOR PNEUMOCOCCAL VACCINE: ICD-10-CM

## 2017-07-26 LAB
ANION GAP SERPL CALCULATED.3IONS-SCNC: 8 MMOL/L (ref 3–14)
BUN SERPL-MCNC: 20 MG/DL (ref 7–30)
CALCIUM SERPL-MCNC: 9.6 MG/DL (ref 8.5–10.1)
CHLORIDE SERPL-SCNC: 97 MMOL/L (ref 94–109)
CHOLEST SERPL-MCNC: 164 MG/DL
CO2 SERPL-SCNC: 35 MMOL/L (ref 20–32)
CREAT SERPL-MCNC: 1.15 MG/DL (ref 0.52–1.04)
GFR SERPL CREATININE-BSD FRML MDRD: 50 ML/MIN/1.7M2
GLUCOSE SERPL-MCNC: 203 MG/DL (ref 70–99)
HBA1C MFR BLD: 6.6 % (ref 4.3–6)
HDLC SERPL-MCNC: 62 MG/DL
LDLC SERPL CALC-MCNC: 80 MG/DL
NONHDLC SERPL-MCNC: 102 MG/DL
POTASSIUM SERPL-SCNC: 4.6 MMOL/L (ref 3.4–5.3)
SODIUM SERPL-SCNC: 140 MMOL/L (ref 133–144)
TRIGL SERPL-MCNC: 108 MG/DL
TSH SERPL DL<=0.05 MIU/L-ACNC: 0.8 MU/L (ref 0.4–4)

## 2017-07-26 PROCEDURE — 83036 HEMOGLOBIN GLYCOSYLATED A1C: CPT | Performed by: INTERNAL MEDICINE

## 2017-07-26 PROCEDURE — 84443 ASSAY THYROID STIM HORMONE: CPT | Performed by: INTERNAL MEDICINE

## 2017-07-26 PROCEDURE — 99214 OFFICE O/P EST MOD 30 MIN: CPT | Mod: 25 | Performed by: INTERNAL MEDICINE

## 2017-07-26 PROCEDURE — 36415 COLL VENOUS BLD VENIPUNCTURE: CPT | Performed by: INTERNAL MEDICINE

## 2017-07-26 PROCEDURE — 80061 LIPID PANEL: CPT | Performed by: INTERNAL MEDICINE

## 2017-07-26 PROCEDURE — 90471 IMMUNIZATION ADMIN: CPT | Performed by: INTERNAL MEDICINE

## 2017-07-26 PROCEDURE — 90472 IMMUNIZATION ADMIN EACH ADD: CPT | Performed by: INTERNAL MEDICINE

## 2017-07-26 PROCEDURE — 90715 TDAP VACCINE 7 YRS/> IM: CPT | Performed by: INTERNAL MEDICINE

## 2017-07-26 PROCEDURE — 90670 PCV13 VACCINE IM: CPT | Performed by: INTERNAL MEDICINE

## 2017-07-26 PROCEDURE — 80048 BASIC METABOLIC PNL TOTAL CA: CPT | Performed by: INTERNAL MEDICINE

## 2017-07-26 RX ORDER — GLYBURIDE-METFORMIN HYDROCHLORIDE 5; 500 MG/1; MG/1
TABLET ORAL
Qty: 360 TABLET | Refills: 3 | COMMUNITY
Start: 2017-07-26 | End: 2017-10-18

## 2017-07-26 RX ORDER — LISINOPRIL 20 MG/1
40 TABLET ORAL DAILY
Qty: 180 TABLET | Refills: 3 | Status: SHIPPED | OUTPATIENT
Start: 2017-07-26 | End: 2018-03-20

## 2017-07-26 RX ORDER — TIOTROPIUM BROMIDE 18 UG/1
CAPSULE ORAL; RESPIRATORY (INHALATION)
Qty: 90 CAPSULE | Refills: 3 | Status: SHIPPED | OUTPATIENT
Start: 2017-07-26 | End: 2018-04-11 | Stop reason: ALTCHOICE

## 2017-07-26 RX ORDER — ALBUTEROL SULFATE 90 UG/1
AEROSOL, METERED RESPIRATORY (INHALATION)
Qty: 18 G | Refills: 3 | Status: SHIPPED | OUTPATIENT
Start: 2017-07-26 | End: 2017-10-03

## 2017-07-26 ASSESSMENT — PAIN SCALES - GENERAL: PAINLEVEL: MILD PAIN (3)

## 2017-07-26 NOTE — NURSING NOTE
"Chief Complaint   Patient presents with     Recheck Medication     diabetes, CAD, Hth and COPD       Initial /90  Pulse 74  Temp 98.1  F (36.7  C) (Temporal)  Resp 16  Wt 174 lb (78.9 kg)  SpO2 92%  BMI 31.83 kg/m2 Estimated body mass index is 31.83 kg/(m^2) as calculated from the following:    Height as of 3/1/17: 5' 2\" (1.575 m).    Weight as of this encounter: 174 lb (78.9 kg).  Medication Reconciliation: complete    "

## 2017-07-26 NOTE — MR AVS SNAPSHOT
"              After Visit Summary   7/26/2017    Bernard Hughes    MRN: [id number]           Patient Information     Date Of Birth          1965        Visit Information        Provider Department      7/26/2017 8:00 AM Dorian Cade MD [facility]         Follow-ups after your visit        Your next 10 appointments already scheduled     Jul 26, 2017  8:00 AM CDT   Office Visit with Dorian Cade MD   [facility] ([facility])    [address] [place] [address]   [phone]           Bring a current list of meds and any records pertaining to this visit.  For Physicals, please bring immunization records and any forms needing to be filled out.  Please arrive 10 minutes early to complete paperwork.              Who to contact     If you have questions or need follow up information about today's clinic visit or your schedule please contact [facility] directly at [phone].  Normal or non-critical lab and imaging results will be communicated to you by MyChart, letter or phone within 4 business days after the clinic has received the results. If you do not hear from us within 7 days, please contact the clinic through Memrisehart or phone. If you have a critical or abnormal lab result, we will notify you by phone as soon as possible.  Submit refill requests through Gametime or call your pharmacy and they will forward the refill request to us. Please allow 3 business days for your refill to be completed.          Additional Information About Your Visit        Gametime Information     Gametime lets you send messages to your doctor, view your test results, renew your prescriptions, schedule appointments and more. To sign up, go to [email or web address] . Click on \"Log in\" on the left side of the screen, which will take you to the Welcome page. Then click on \"Sign up Now\" on the right side of the page.     You will be asked to enter the access " code listed below, as well as some personal information. Please follow the directions to create your username and password.     Your access code is: [email or web address]  Expires: 10/19/2017  2:07 PM     Your access code will  in 90 days. If you need help or a new code, please call your [facility] or [phone].        Care EveryWhere ID     This is your Care EveryWhere ID. This could be used by other organizations to access your [facility] medical records  [id number]        Your Vitals Were     Pulse Temperature Respirations Pulse Oximetry BMI (Body Mass Index)       74 98.1  F (36.7  C) (Temporal) 16 92% 31.83 kg/m2        Blood Pressure from Last 3 Encounters:   17 144/90   17 118/64   17 158/76    Weight from Last 3 Encounters:   17 174 lb (78.9 kg)   17 164 lb (74.4 kg)   17 168 lb (76.2 kg)              Today, you had the following     No orders found for display       Primary Care Provider Office Phone # Fax #    Dorian Cade [phone]       [facility] [address] [place] [address]        Equal Access to Services     [facility]: Hadii aad ku hadasho Soomaali, waaxda luqadaha, qaybta kaalmada adeegyada, waxay idiin hayaan felice lea laleonor ah. So [facility] [phone].    ATENCIÓN: Si habla español, tiene a hitchcock disposición servicios gratuitos de asistencia lingüística. Llame al [phone].    We comply with applicable federal civil rights laws and [place] laws. We do not discriminate on the basis of race, color, national origin, age, disability sex, sexual orientation or gender identity.            Thank you!     Thank you for choosing [facility]  for your care. Our goal is always to provide you with excellent care. Hearing back from our patients is one way we can continue to improve our services. Please take a few minutes to complete the written survey that you may receive in the mail after your visit with  us. Thank you!             Your Updated Medication List - Protect others around you: Learn how to safely use, store and throw away your medicines at www.disposemymeds.org.          This list is accurate as of: 7/26/17  7:54 AM.  Always use your most recent med list.                   Brand Name Dispense Instructions for use Diagnosis    aspirin 81 MG EC tablet     90 tablet    Take 1 tablet (81 mg) by mouth daily    Type 2 diabetes, HbA1C goal < 8% (H), Hypertension goal BP (blood pressure) < 140/90       atorvastatin 40 MG tablet    LIPITOR    90 tablet    Take 1 tablet (40 mg) by mouth daily    Coronary artery disease involving native coronary artery of native heart, angina presence unspecified       FLUoxetine 40 MG capsule    PROzac    90 capsule    Take 1 capsule (40 mg) by mouth daily    Adjustment disorder with depressed mood       glyBURIDE-metFORMIN 5-500 MG per tablet    GLUCOVANCE    360 tablet    TAKE TWO TABLETS BY MOUTH TWICE A DAY WITH MEALS    Type 2 diabetes mellitus with hyperglycemia, without long-term current use of insulin (H)       ipratropium - albuterol 0.5 mg/2.5 mg/3 mL 0.5-2.5 (3) MG/3ML neb solution    DUONEB    360 mL    Take 1 vial (3 mLs) by nebulization every 4 hours as needed for shortness of breath / dyspnea or wheezing    Chronic obstructive pulmonary disease with acute exacerbation (H), SOB (shortness of breath)       lisinopril 20 MG tablet    PRINIVIL/ZESTRIL    180 tablet    Take 2 tablets (40 mg) by mouth daily    Essential hypertension with goal blood pressure less than 140/90       metoprolol 50 MG tablet    LOPRESSOR    180 tablet    Take 1 tablet (50 mg) by mouth 2 times daily    Essential hypertension with goal blood pressure less than 140/90       NITROGLYCERIN SL      Place 0.4 mg under the tongue every 5 minutes as needed for chest pain        PLAVIX PO      Take 75 mg by mouth daily        SPIRIVA HANDIHALER 18 MCG capsule   Generic drug:  tiotropium     90 capsule     USING THE HANDIHALER, INHALE THE CONTENTS OF ONE CAPSULE DAILY    Chronic obstructive pulmonary disease, unspecified COPD type (H)       TYLENOL PO      Take 325 mg by mouth daily as needed for mild pain or fever        VENTOLIN  (90 BASE) MCG/ACT Inhaler   Generic drug:  albuterol     18 g    INHALE 2 PUFFS INTO THE LUNGS EVERY 4 HOURS AS NEEDED FOR SHORTNESS OF BREATH OR DIFFICULTY BREATHING    Chronic obstructive pulmonary disease, unspecified COPD type (H)

## 2017-07-26 NOTE — PROGRESS NOTES
Screening Questionnaire for Adult Immunization    Are you sick today?   No   Do you have allergies to medications, food, a vaccine component or latex?   Yes   Have you ever had a serious reaction after receiving a vaccination?   No   Do you have a long-term health problem with heart disease, lung disease, asthma, kidney disease, metabolic disease (e.g. diabetes), anemia, or other blood disorder?   Yes   Do you have cancer, leukemia, HIV/AIDS, or any other immune system problem?   No   In the past 3 months, have you taken medications that affect  your immune system, such as prednisone, other steroids, or anticancer drugs; drugs for the treatment of rheumatoid arthritis, Crohn s disease, or psoriasis; or have you had radiation treatments?   No   Have you had a seizure, or a brain or other nervous system problem?   No   During the past year, have you received a transfusion of blood or blood     products, or been given immune (gamma) globulin or antiviral drug?   No   For women: Are you pregnant or is there a chance you could become        pregnant during the next month?   No   Have you received any vaccinations in the past 4 weeks?   No     Immunization questionnaire was positive for at least one answer.  Notified yes.      MNVFC doesn't apply on this patient    Per orders of Dr. Dorian Cade, injection of tdap and prevnar given by Taylor Hughes. Patient instructed to remain in clinic for 15 minutes afterwards, and to report any adverse reaction to me immediately.       Screening performed by Taylor Hughes on 7/26/2017 at 8:31 AM.

## 2017-07-26 NOTE — PROGRESS NOTES
SUBJECTIVE:                                                    Bernard Hughes is a 51 year old female who presents to clinic today for the following health issues:    Chief Complaint   Patient presents with     Recheck Medication     diabetes, CAD, Hth and COPD     Doing pretty well, feeling rested and doing ok.  Needs labs done today, fasting.  Hasn't had her meds today so bp is a little high.  Will take them after the labs are done.    prozac is doing good, mood is quite good.    COPD and uses a walker, can rest if needed.  Legs are getting stronger.  Taking her spiriva and then uses albuterol as inhaler or neb every day.      Checks her blood sugars once a day, getting some highs and still bottoms out at times.      Past Medical History:   Diagnosis Date     ASCVD (arteriosclerotic cardiovascular disease)     3 vessel bypass 3/2017     COPD (chronic obstructive pulmonary disease) (H)      Diabetes (H)      Hypertension      Kidney disease      Current Outpatient Prescriptions   Medication     tiotropium (SPIRIVA HANDIHALER) 18 MCG capsule     albuterol (VENTOLIN HFA) 108 (90 BASE) MCG/ACT Inhaler     lisinopril (PRINIVIL/ZESTRIL) 20 MG tablet     glyBURIDE-metFORMIN (GLUCOVANCE) 5-500 MG per tablet     ipratropium - albuterol 0.5 mg/2.5 mg/3 mL (DUONEB) 0.5-2.5 (3) MG/3ML neb solution     FLUoxetine (PROZAC) 40 MG capsule     Clopidogrel Bisulfate (PLAVIX PO)     atorvastatin (LIPITOR) 40 MG tablet     metoprolol (LOPRESSOR) 50 MG tablet     aspirin 81 MG EC tablet     [DISCONTINUED] glyBURIDE-metFORMIN (GLUCOVANCE) 5-500 MG per tablet     [DISCONTINUED] VENTOLIN  (90 BASE) MCG/ACT Inhaler     Acetaminophen (TYLENOL PO)     NITROGLYCERIN SL     [DISCONTINUED] SPIRIVA HANDIHALER 18 MCG inhalation capsule     [DISCONTINUED] lisinopril (PRINIVIL,ZESTRIL) 20 MG tablet     No current facility-administered medications for this visit.      Social History   Substance Use Topics     Smoking status: Former  Smoker     Smokeless tobacco: Not on file     Alcohol use Not on file     Review of Systems  Constitutional-No fevers, chills, or weight changes..  Cardiac-No chest pain or palpitations.  Respiratory-No cough, sob, or hemoptysis.  GI-No nausea, vomitting, diarrhea, constipation, or blood in the stool.  Musculoskeletal-No muscles aches or joint pains.    Physical Exam  /90  Pulse 74  Temp 98.1  F (36.7  C) (Temporal)  Resp 16  Wt 174 lb (78.9 kg)  SpO2 92%  BMI 31.83 kg/m2  General Appearance-healthy, alert, no distress  Cardiac-regular rate and rhythm  with normal S1, S2 ; no murmur, rub or gallops  Lungs-mild to moderate decreased air movement  Extremities-no peripheral edema, peripheral pulses normal    ASSESSMENT:  Patient is here for follow-up. Overall she is doing well, feeling better.    Depression-the patient is doing much better. She is on Prozac 40 mg, very happy with her mood, excited about her new grandchild.    Diabetes-the patient is having some low blood sugars at night. Otherwise her blood sugars are going pretty good, we'll check an A1c. Actually reduce her glyburide metformin at night from 2 pills to one pill to avoid hypoglycemia.    Hyperlipidemia-will check fasting lipid panel today.    Hypertension-the patient's blood pressure is slightly elevated but on recheck is improved, his nausea hasn't taken her medications is probably much lower when she is on her pills.    COPD is stable, she is on Spiriva and albuterol.  In the albuterol either nebs or inhaler multiple times a day but she is stable. Her lung sounds are diminished with decreased air movement we will update her pneumonia shot and tetanus shot today.    Electronically signed by Dorian Cade MD

## 2017-09-28 NOTE — PROGRESS NOTES
SUBJECTIVE:                                                    Bernard Hughes is a 52 year old female who presents to clinic today for the following health issues:      HPI    Rash  Onset: 1 week    Description:   Location: groin area , under right breast  Character: raised, red, painful   Itching (Pruritis): YES    Progression of Symptoms:  worsening    Accompanying Signs & Symptoms:  Fever: no   Body aches or joint pain: YES  Sore throat symptoms: no   Recent cold symptoms: no     History:   Previous similar rash: YES    Precipitating factors:   Exposure to similar rash: no   New exposures: None   Recent travel: no     Alleviating factors:  Powder    Therapies Tried and outcome: Saul and Saul Powder    No recent antibiotic use. Worsening, sweats a lot. Does not do a good job at drying underneath her breasts and groin area. No fevers or chills.       Problem list and histories reviewed & adjusted, as indicated.  Additional history: as documented    Current Outpatient Prescriptions   Medication Sig Dispense Refill     ipratropium - albuterol 0.5 mg/2.5 mg/3 mL (DUONEB) 0.5-2.5 (3) MG/3ML neb solution NEBULIZE CONTENTS OF ONE VIAL EVERY 4 HOURS AS NEEDED FOR SHORTNESS OF BREATH / DYSPNEA OR WHEEZING 360 mL 6     albuterol (VENTOLIN HFA) 108 (90 BASE) MCG/ACT Inhaler INHALE 2 PUFFS INTO THE LUNGS EVERY 4 HOURS AS NEEDED FOR SHORTNESS OF BREATH OR DIFFICULTY BREATHING 18 g 3     lisinopril (PRINIVIL/ZESTRIL) 20 MG tablet Take 2 tablets (40 mg) by mouth daily 180 tablet 3     glyBURIDE-metFORMIN (GLUCOVANCE) 5-500 MG per tablet 2 tablets in the AM and 1 at PM. 360 tablet 3     FLUoxetine (PROZAC) 40 MG capsule Take 1 capsule (40 mg) by mouth daily 90 capsule 3     Acetaminophen (TYLENOL PO) Take 325 mg by mouth daily as needed for mild pain or fever       NITROGLYCERIN SL Place 0.4 mg under the tongue every 5 minutes as needed for chest pain       Clopidogrel Bisulfate (PLAVIX PO) Take 75 mg by mouth daily        atorvastatin (LIPITOR) 40 MG tablet Take 1 tablet (40 mg) by mouth daily 90 tablet 3     metoprolol (LOPRESSOR) 50 MG tablet Take 1 tablet (50 mg) by mouth 2 times daily 180 tablet 3     aspirin 81 MG EC tablet Take 1 tablet (81 mg) by mouth daily 90 tablet 3     tiotropium (SPIRIVA HANDIHALER) 18 MCG capsule USING THE HANDIHALER, INHALE THE CONTENTS OF ONE CAPSULE DAILY 90 capsule 3     Allergies   Allergen Reactions     Xanax [Alprazolam] Other (See Comments)     Agitation     BP Readings from Last 3 Encounters:   09/29/17 168/64   07/26/17 136/86   04/28/17 118/64    Wt Readings from Last 3 Encounters:   09/29/17 171 lb (77.6 kg)   07/26/17 174 lb (78.9 kg)   04/28/17 164 lb (74.4 kg)              ROS:  Constitutional, HEENT, cardiovascular, pulmonary, GI, , musculoskeletal, neuro,   Positive for Anxiety     OBJECTIVE:   /64 (BP Location: Right arm, Patient Position: Chair, Cuff Size: Adult Large)  Pulse 62  Temp 99.1  F (37.3  C) (Temporal)  Resp 16  Wt 171 lb (77.6 kg)  SpO2 94%  BMI 31.28 kg/m2  Body mass index is 31.28 kg/(m^2).  GENERAL: healthy, alert and no distress  SKIN: scattered red papules with erythema underneath right breast and along the entire suprapubic area.   NEURO: Normal strength and tone, mentation intact and speech normal  PSYCH: mentation appears normal, affect normal/bright    Diagnostic Test Results:  none     ASSESSMENT/PLAN:       1. Candidiasis of skin  -  Start nystatin powder three times daily to affected areas. Recommend cleaning area and patting dry prior to using powder. Make sure after you shower you are patting yourself dry, especially in areas where moisture can trap (under skinfold's especially).   - Use powder three times daily until healing is complete  - Follow up with PCP if symptoms worsen or do not improve  - Monitor your blood sugars, when not controlled this can lead to an increase in infections.   - Follow up for a blood pressure recheck, likely  this is elevated today due to your anxiety.   - nystatin (MYCOSTATIN) [id number] UNIT/GM POWD; Apply topically 3 times daily as needed  Dispense: 30 g; Refill: 1    The patient indicates understanding of these issues and agrees with the plan.    Patient Instructions   - Start nystatin powder three times daily to affected areas. Recommend cleaning area and patting dry prior to using powder. Make sure after you shower you are patting yourself dry, especially in areas where moisture can trap (under skinfold's especially).   - Use powder three times daily until healing is complete  - Follow up with PCP if symptoms worsen or do not improve  - Monitor your blood sugars, when not controlled this can lead to an increase in infections.   - Follow up for a blood pressure recheck, likely this is elevated today due to your anxiety.       ADAM Trinidad CNP, APRN CNP  [facility]

## 2017-09-29 ENCOUNTER — OFFICE VISIT (OUTPATIENT)
Dept: FAMILY MEDICINE | Facility: OTHER | Age: 52
End: 2017-09-29
Payer: COMMERCIAL

## 2017-09-29 VITALS
WEIGHT: 171 LBS | BODY MASS INDEX: 31.28 KG/M2 | TEMPERATURE: 99.1 F | DIASTOLIC BLOOD PRESSURE: 64 MMHG | HEART RATE: 62 BPM | OXYGEN SATURATION: 94 % | SYSTOLIC BLOOD PRESSURE: 168 MMHG | RESPIRATION RATE: 16 BRPM

## 2017-09-29 DIAGNOSIS — B37.2 CANDIDIASIS OF SKIN: Primary | ICD-10-CM

## 2017-09-29 PROCEDURE — 99213 OFFICE O/P EST LOW 20 MIN: CPT | Performed by: NURSE PRACTITIONER

## 2017-09-29 RX ORDER — NYSTATIN 100000 [USP'U]/G
POWDER TOPICAL 3 TIMES DAILY PRN
Qty: 30 G | Refills: 1 | Status: SHIPPED | OUTPATIENT
Start: 2017-09-29 | End: 2018-03-20

## 2017-09-29 NOTE — NURSING NOTE
"Chief Complaint   Patient presents with     Derm Problem     Panel Management       Initial /76 (BP Location: Right arm, Patient Position: Chair, Cuff Size: Adult Large)  Pulse 62  Temp 99.1  F (37.3  C) (Temporal)  Resp 16  Wt 171 lb (77.6 kg)  SpO2 94%  BMI 31.28 kg/m2 Estimated body mass index is 31.28 kg/(m^2) as calculated from the following:    Height as of 3/1/17: 5' 2\" (1.575 m).    Weight as of this encounter: 171 lb (77.6 kg).  Medication Reconciliation: complete   Rhiannon Weston, CMA      "

## 2017-09-29 NOTE — PATIENT INSTRUCTIONS
- Start nystatin powder three times daily to affected areas. Recommend cleaning area and patting dry prior to using powder. Make sure after you shower you are patting yourself dry, especially in areas where moisture can trap (under skinfold's especially).   - Use powder three times daily until healing is complete  - Follow up with PCP if symptoms worsen or do not improve  - Monitor your blood sugars, when not controlled this can lead to an increase in infections.   - Follow up for a blood pressure recheck, likely this is elevated today due to your anxiety.       ADAM Trinidad CNP

## 2017-09-29 NOTE — MR AVS SNAPSHOT
After Visit Summary   9/29/2017    Bernard Hughes    MRN: [id number]           Patient Information     Date Of Birth          1965        Visit Information        Provider Department      9/29/2017 10:20 AM Britany Mccracken APRN CNP [facility]        Today's Diagnoses     Candidiasis of skin    -  1      Care Instructions    - Start nystatin powder three times daily to affected areas. Recommend cleaning area and patting dry prior to using powder. Make sure after you shower you are patting yourself dry, especially in areas where moisture can trap (under skinfold's especially).   - Use powder three times daily until healing is complete  - Follow up with PCP if symptoms worsen or do not improve  - Monitor your blood sugars, when not controlled this can lead to an increase in infections.   - Follow up for a blood pressure recheck, likely this is elevated today due to your anxiety.       ADAM Trinidad CNP            Follow-ups after your visit        Follow-up notes from your care team     Return if symptoms worsen or fail to improve.      Who to contact     If you have questions or need follow up information about today's clinic visit or your schedule please contact [facility] directly at [phone].  Normal or non-critical lab and imaging results will be communicated to you by MyChart, letter or phone within 4 business days after the clinic has received the results. If you do not hear from us within 7 days, please contact the clinic through [employer] or phone. If you have a critical or abnormal lab result, we will notify you by phone as soon as possible.  Submit refill requests through [employer] or call your pharmacy and they will forward the refill request to us. Please allow 3 business days for your refill to be completed.          Additional Information About Your Visit        [employer] Information     [employer] lets you send messages to your doctor, view  "your test results, renew your prescriptions, schedule appointments and more. To sign up, go to [email or web address] . Click on \"Log in\" on the left side of the screen, which will take you to the Welcome page. Then click on \"Sign up Now\" on the right side of the page.     You will be asked to enter the access code listed below, as well as some personal information. Please follow the directions to create your username and password.     Your access code is: [email or web address]  Expires: 10/19/2017  2:07 PM     Your access code will  in 90 days. If you need help or a new code, please call your [facility] clinic or [phone].        Care EveryWhere ID     This is your Care EveryWhere ID. This could be used by other organizations to access your [facility] medical records  [id number]        Your Vitals Were     Pulse Temperature Respirations Pulse Oximetry BMI (Body Mass Index)       62 99.1  F (37.3  C) (Temporal) 16 94% 31.28 kg/m2        Blood Pressure from Last 3 Encounters:   17 168/64   17 136/86   17 118/64    Weight from Last 3 Encounters:   17 171 lb (77.6 kg)   17 174 lb (78.9 kg)   17 164 lb (74.4 kg)              Today, you had the following     No orders found for display         Today's Medication Changes          These changes are accurate as of: 17 10:32 AM.  If you have any questions, ask your nurse or doctor.               Start taking these medicines.        Dose/Directions    nystatin [id number] UNIT/GM Powd   Commonly known as:  MYCOSTATIN   Used for:  Candidiasis of skin   Started by:  Britany Mccracken APRN CNP        Apply topically 3 times daily as needed   Quantity:  30 g   Refills:  1            Where to get your medicines      These medications were sent to [facility] [place]  [place], [place] - [address]  [address] [place] [address] [address]     Phone:  [phone]     nystatin [id number] UNIT/GM Powd                Primary Care Provider " Office Phone # Fax #    Dorian Cade [phone]       [facility] [address] [place] [address]        Equal Access to Services     SUSIE MOLINA : Hadsalud jacek castellon christio Sobeeali, waaxda luqadaha, qaybta kaalmada ademckaylada, hermes lea laNormanahmet chiang. So [facility] [phone].    ATENCIÓN: Si habla español, tiene a hitchcock disposición servicios gratuitos de asistencia lingüística. Llame al [phone].    We comply with applicable federal civil rights laws and [place] laws. We do not discriminate on the basis of race, color, national origin, age, disability sex, sexual orientation or gender identity.            Thank you!     Thank you for choosing [facility]  for your care. Our goal is always to provide you with excellent care. Hearing back from our patients is one way we can continue to improve our services. Please take a few minutes to complete the written survey that you may receive in the mail after your visit with us. Thank you!             Your Updated Medication List - Protect others around you: Learn how to safely use, store and throw away your medicines at www.disposemymeds.org.          This list is accurate as of: 9/29/17 10:32 AM.  Always use your most recent med list.                   Brand Name Dispense Instructions for use Diagnosis    albuterol 108 (90 BASE) MCG/ACT Inhaler    VENTOLIN HFA    18 g    INHALE 2 PUFFS INTO THE LUNGS EVERY 4 HOURS AS NEEDED FOR SHORTNESS OF BREATH OR DIFFICULTY BREATHING    Chronic obstructive pulmonary disease, unspecified COPD type (H)       aspirin 81 MG EC tablet     90 tablet    Take 1 tablet (81 mg) by mouth daily    Type 2 diabetes, HbA1C goal < 8% (H), Hypertension goal BP (blood pressure) < 140/90       atorvastatin 40 MG tablet    LIPITOR    90 tablet    Take 1 tablet (40 mg) by mouth daily    Coronary artery disease involving native coronary artery of native heart, angina presence unspecified        FLUoxetine 40 MG capsule    PROzac    90 capsule    Take 1 capsule (40 mg) by mouth daily    Adjustment disorder with depressed mood       glyBURIDE-metFORMIN 5-500 MG per tablet    GLUCOVANCE    360 tablet    2 tablets in the AM and 1 at PM.    Type 2 diabetes mellitus with hyperglycemia, without long-term current use of insulin (H)       ipratropium - albuterol 0.5 mg/2.5 mg/3 mL 0.5-2.5 (3) MG/3ML neb solution    DUONEB    360 mL    NEBULIZE CONTENTS OF ONE VIAL EVERY 4 HOURS AS NEEDED FOR SHORTNESS OF BREATH / DYSPNEA OR WHEEZING    Chronic obstructive pulmonary disease with acute exacerbation (H), SOB (shortness of breath)       lisinopril 20 MG tablet    PRINIVIL/ZESTRIL    180 tablet    Take 2 tablets (40 mg) by mouth daily    Essential hypertension with goal blood pressure less than 140/90       metoprolol 50 MG tablet    LOPRESSOR    180 tablet    Take 1 tablet (50 mg) by mouth 2 times daily    Essential hypertension with goal blood pressure less than 140/90       NITROGLYCERIN SL      Place 0.4 mg under the tongue every 5 minutes as needed for chest pain        nystatin [id number] UNIT/GM Powd    MYCOSTATIN    30 g    Apply topically 3 times daily as needed    Candidiasis of skin       PLAVIX PO      Take 75 mg by mouth daily        tiotropium 18 MCG capsule    SPIRIVA HANDIHALER    90 capsule    USING THE HANDIHALER, INHALE THE CONTENTS OF ONE CAPSULE DAILY    Chronic obstructive pulmonary disease, unspecified COPD type (H)       TYLENOL PO      Take 325 mg by mouth daily as needed for mild pain or fever

## 2017-10-12 ENCOUNTER — TRANSFERRED RECORDS (OUTPATIENT)
Dept: HEALTH INFORMATION MANAGEMENT | Facility: CLINIC | Age: 52
End: 2017-10-12

## 2017-10-13 LAB — HBA1C MFR BLD: 6.8 % (ref 0–5.7)

## 2017-10-16 ENCOUNTER — TELEPHONE (OUTPATIENT)
Dept: INTERNAL MEDICINE | Facility: CLINIC | Age: 52
End: 2017-10-16

## 2017-10-16 DIAGNOSIS — E11.65 TYPE 2 DIABETES MELLITUS WITH HYPERGLYCEMIA, WITHOUT LONG-TERM CURRENT USE OF INSULIN (H): ICD-10-CM

## 2017-10-16 LAB
CREAT SERPL-MCNC: 1.21 MG/DL (ref 0.57–1.11)
GFR SERPL CREATININE-BSD FRML MDRD: 47 ML/MIN/1.73M2
GLUCOSE SERPL-MCNC: 387 MG/DL (ref 65–100)
POTASSIUM SERPL-SCNC: 4.6 MMOL/L (ref 3.5–5)

## 2017-10-16 NOTE — TELEPHONE ENCOUNTER
Reason for Call:  Same Day Appointment, Requested Provider:  Dorian Cade MD    PCP: Dorian Cade    Reason for visit: hospital/ICU 10/16 discharge follow up pneumonia, kidney issues & CO2 poisoning    Duration of symptoms: 10/12/17    Have you been treated for this in the past? Yes, is currently at [facility]    Additional comments: patient needs to be seen at the end of this week or by Monday of next week.  Dr Cade is out Thursday and Friday, so Monday 10/16/17 would work fine    Can we leave a detailed message on this number? YES    Phone number patient can be reached at: Cell number on file:    Telephone Information:   Mobile [phone]       Best Time:     Call taken on 10/16/2017 at 8:41 AM by Tiffany Romero

## 2017-10-16 NOTE — TELEPHONE ENCOUNTER
Appt made and info given to patient. Will postpone until Tuesday to make hospital F/U call.  Aleah Hughes RN

## 2017-10-17 ENCOUNTER — TELEPHONE (OUTPATIENT)
Dept: INTERNAL MEDICINE | Facility: CLINIC | Age: 52
End: 2017-10-17

## 2017-10-17 NOTE — TELEPHONE ENCOUNTER
Reason for Call: Request for an order or referral:    Order or referral being requested: Judy from [facility] is calling stating that she needs a delayed start of care for patient as she wants them to come out on Thursday and their order is only good for 48 hours     Date needed: as soon as possible    Has the patient been seen by the PCP for this problem? YES    Additional comments:     Phone number Patient can be reached at:  Home number on file [phone] (home)    Best Time:  any    Can we leave a detailed message on this number?  YES    Call taken on 10/17/2017 at 12:16 PM by Leigh Hallman

## 2017-10-17 NOTE — TELEPHONE ENCOUNTER
[facility] notified of message below. Mari Marie CMA ([facility])

## 2017-10-18 RX ORDER — GLYBURIDE-METFORMIN HYDROCHLORIDE 5; 500 MG/1; MG/1
TABLET ORAL
Qty: 360 TABLET | Refills: 3 | COMMUNITY
Start: 2017-10-18 | End: 2018-08-14

## 2017-10-18 NOTE — TELEPHONE ENCOUNTER
"Hospital/TCU/ED for chronic condition Discharge Protocol    \"Hi, my name is Aleah Hughes, a registered nurse, and I am calling from [facility].  I am calling to follow up and see how things are going for you after your recent emergency visit/hospital/TCU stay.\"    Tell me how you are doing now that you are home?\" I am really tired and run down.      Discharge Instructions    \"Let's review your discharge instructions.  What is/are the follow-up recommendations?  Pt. Response: I have therapy coming out to the house now.    \"Has an appointment with your primary care provider been scheduled?\"   Yes. (confirm)    \"When you see the provider, I would recommend that you bring your medications with you.\"    Medications    \"Tell me what changed about your medicines when you discharged?\"    Changes to chronic meds?    0-1    \"What questions do you have about your medications?\"    None     New diagnoses of heart failure, COPD, diabetes, or MI?    No     On insulin: \"Did you start on insulin in the hospital or did you have your insulin dose changed?\"  No           Medication reconciliation completed? Yes  Was MTM referral placed (*Make sure to put transitions as reason for referral)?   No    Call Summary    \"What questions or concerns do you have about your recent visit and your follow-up care?\"     none    \"If you have questions or things don't continue to improve, we encourage you contact us through the main clinic number (give number).  Even if the clinic is not open, triage nurses are available 24/7 to help you.     We would like you to know that our clinic has extended hours (provide information).  We also have urgent care (provide details on closest location and hours/contact info)\"      \"Thank you for your time and take care!\"           "

## 2017-10-19 NOTE — TELEPHONE ENCOUNTER
Noah from [facility] is calling, he's in the home right now waiting for verbal orders. He is asking for    6 visits - 2 times a week for 3 weeks  OT eval and treat    Please call him back as soon as you can at [phone]. You can leave a message.

## 2017-10-23 ENCOUNTER — MEDICAL CORRESPONDENCE (OUTPATIENT)
Dept: HEALTH INFORMATION MANAGEMENT | Facility: CLINIC | Age: 52
End: 2017-10-23

## 2017-10-25 ENCOUNTER — OFFICE VISIT (OUTPATIENT)
Dept: INTERNAL MEDICINE | Facility: CLINIC | Age: 52
End: 2017-10-25
Payer: COMMERCIAL

## 2017-10-25 ENCOUNTER — HOSPITAL ENCOUNTER (OUTPATIENT)
Dept: GENERAL RADIOLOGY | Facility: CLINIC | Age: 52
Discharge: HOME | End: 2017-10-25
Attending: INTERNAL MEDICINE | Admitting: INTERNAL MEDICINE
Payer: COMMERCIAL

## 2017-10-25 VITALS
SYSTOLIC BLOOD PRESSURE: 138 MMHG | RESPIRATION RATE: 16 BRPM | HEART RATE: 68 BPM | TEMPERATURE: 98.6 F | OXYGEN SATURATION: 93 % | WEIGHT: 190 LBS | DIASTOLIC BLOOD PRESSURE: 74 MMHG | BODY MASS INDEX: 34.75 KG/M2

## 2017-10-25 DIAGNOSIS — E11.65 TYPE 2 DIABETES MELLITUS WITH HYPERGLYCEMIA, WITHOUT LONG-TERM CURRENT USE OF INSULIN (H): ICD-10-CM

## 2017-10-25 DIAGNOSIS — J44.9 CHRONIC OBSTRUCTIVE PULMONARY DISEASE, UNSPECIFIED COPD TYPE (H): ICD-10-CM

## 2017-10-25 DIAGNOSIS — I10 ESSENTIAL HYPERTENSION WITH GOAL BLOOD PRESSURE LESS THAN 140/90: ICD-10-CM

## 2017-10-25 DIAGNOSIS — F33.9 RECURRENT MAJOR DEPRESSIVE DISORDER, REMISSION STATUS UNSPECIFIED (H): Primary | ICD-10-CM

## 2017-10-25 DIAGNOSIS — F43.22 ADJUSTMENT DISORDER WITH ANXIOUS MOOD: ICD-10-CM

## 2017-10-25 LAB
ANION GAP SERPL CALCULATED.3IONS-SCNC: 5 MMOL/L (ref 3–14)
BUN SERPL-MCNC: 22 MG/DL (ref 7–30)
CALCIUM SERPL-MCNC: 8.2 MG/DL (ref 8.5–10.1)
CHLORIDE SERPL-SCNC: 98 MMOL/L (ref 94–109)
CO2 SERPL-SCNC: 37 MMOL/L (ref 20–32)
CREAT SERPL-MCNC: 0.92 MG/DL (ref 0.52–1.04)
GFR SERPL CREATININE-BSD FRML MDRD: 64 ML/MIN/1.7M2
GLUCOSE SERPL-MCNC: 228 MG/DL (ref 70–99)
POTASSIUM SERPL-SCNC: 4.5 MMOL/L (ref 3.4–5.3)
SODIUM SERPL-SCNC: 140 MMOL/L (ref 133–144)

## 2017-10-25 PROCEDURE — 80048 BASIC METABOLIC PNL TOTAL CA: CPT | Performed by: INTERNAL MEDICINE

## 2017-10-25 PROCEDURE — 71020 XR CHEST 2 VW: CPT | Mod: TC

## 2017-10-25 PROCEDURE — 36415 COLL VENOUS BLD VENIPUNCTURE: CPT | Performed by: INTERNAL MEDICINE

## 2017-10-25 PROCEDURE — 99495 TRANSJ CARE MGMT MOD F2F 14D: CPT | Performed by: INTERNAL MEDICINE

## 2017-10-25 RX ORDER — ATORVASTATIN CALCIUM 80 MG/1
80 TABLET, FILM COATED ORAL DAILY
COMMUNITY
End: 2018-03-02

## 2017-10-25 RX ORDER — ALPRAZOLAM 0.5 MG
0.5 TABLET ORAL 3 TIMES DAILY PRN
Qty: 30 TABLET | Refills: 0 | Status: SHIPPED | OUTPATIENT
Start: 2017-10-25 | End: 2018-01-01

## 2017-10-25 ASSESSMENT — PATIENT HEALTH QUESTIONNAIRE - PHQ9: SUM OF ALL RESPONSES TO PHQ QUESTIONS 1-9: 16

## 2017-10-25 ASSESSMENT — PAIN SCALES - GENERAL: PAINLEVEL: NO PAIN (0)

## 2017-10-25 NOTE — NURSING NOTE
"Chief Complaint   Patient presents with     Hospital F/U     Anxiety       Initial /74  Pulse 68  Temp 98.6  F (37  C) (Temporal)  Resp 16  Wt 190 lb (86.2 kg)  SpO2 93%  BMI 34.75 kg/m2 Estimated body mass index is 34.75 kg/(m^2) as calculated from the following:    Height as of 3/1/17: 5' 2\" (1.575 m).    Weight as of this encounter: 190 lb (86.2 kg).  Medication Reconciliation: complete    "

## 2017-10-25 NOTE — MR AVS SNAPSHOT
"              After Visit Summary   10/25/2017    Bernard Hughes    MRN: [id number]           Patient Information     Date Of Birth          1965        Visit Information        Provider Department      10/25/2017 2:15 PM Dorian Cade MD [facility]        Today's Diagnoses     Recurrent major depressive disorder, remission status unspecified (H)    -  1       Follow-ups after your visit        Your next 10 appointments already scheduled     Oct 25, 2017  2:15 PM CDT   Office Visit with Dorian Cade MD   [facility] ([facility])    [address] [place] [address] [phone]           Bring a current list of meds and any records pertaining to this visit. For Physicals, please bring immunization records and any forms needing to be filled out. Please arrive 10 minutes early to complete paperwork.              Who to contact     If you have questions or need follow up information about today's clinic visit or your schedule please contact [facility] directly at [phone].  Normal or non-critical lab and imaging results will be communicated to you by [employer], letter or phone within 4 business days after the clinic has received the results. If you do not hear from us within 7 days, please contact the clinic through [employer] or phone. If you have a critical or abnormal lab result, we will notify you by phone as soon as possible.  Submit refill requests through [employer] or call your pharmacy and they will forward the refill request to us. Please allow 3 business days for your refill to be completed.          Additional Information About Your Visit        [employer] Information     [facility] lets you send messages to your doctor, view your test results, renew your prescriptions, schedule appointments and more. To sign up, go to [email or web address] . Click on \"Log in\" on the left side of the screen, which will take you to the Welcome " "page. Then click on \"Sign up Now\" on the right side of the page.     You will be asked to enter the access code listed below, as well as some personal information. Please follow the directions to create your username and password.     Your access code is: [email or web address]  Expires: 2018  2:12 PM     Your access code will  in 90 days. If you need help or a new code, please call your [facility] or [phone].        Care EveryWhere ID     This is your Care EveryWhere ID. This could be used by other organizations to access your [facility] [place] medical records  [id number]        Your Vitals Were     Pulse Temperature Respirations Pulse Oximetry BMI (Body Mass Index)       68 98.6  F (37  C) (Temporal) 16 93% 34.75 kg/m2        Blood Pressure from Last 3 Encounters:   10/25/17 138/74   17 168/64   17 136/86    Weight from Last 3 Encounters:   10/25/17 190 lb (86.2 kg)   17 171 lb (77.6 kg)   17 174 lb (78.9 kg)              We Performed the Following     DEPRESSION ACTION PLAN (DAP)          Today's Medication Changes          These changes are accurate as of: 10/25/17  2:12 PM.  If you have any questions, ask your nurse or doctor.               These medicines have changed or have updated prescriptions.        Dose/Directions    atorvastatin 80 MG tablet   Commonly known as:  LIPITOR   This may have changed:  Another medication with the same name was removed. Continue taking this medication, and follow the directions you see here.   Changed by:  Dorian Cade MD        Dose:  80 mg   Take 80 mg by mouth daily   Refills:  0                Primary Care Provider Office Phone # Fax #    Dorian Cade [phone]       [facility] [address] [place] [address]        Equal Access to Services     SUSIE MOLINA AH: Hadii aad ku hadasho Soomaali, waaxda luqadaha, qaybta kaalmada adeegyada, waxay francisca chiang. So wa " [phone].    ATENCIÓN: Si elliot reyna, tiene a hitchcock disposición servicios gratuitos de asistencia lingüística. Mónica lanier [phone].    We comply with applicable federal civil rights laws and [place] laws. We do not discriminate on the basis of race, color, national origin, age, disability, sex, sexual orientation, or gender identity.            Thank you!     Thank you for choosing [facility]  for your care. Our goal is always to provide you with excellent care. Hearing back from our patients is one way we can continue to improve our services. Please take a few minutes to complete the written survey that you may receive in the mail after your visit with us. Thank you!             Your Updated Medication List - Protect others around you: Learn how to safely use, store and throw away your medicines at www.disposemymeds.org.          This list is accurate as of: 10/25/17  2:12 PM.  Always use your most recent med list.                   Brand Name Dispense Instructions for use Diagnosis    aspirin 81 MG EC tablet     90 tablet    Take 1 tablet (81 mg) by mouth daily    Type 2 diabetes, HbA1C goal < 8% (H), Hypertension goal BP (blood pressure) < 140/90       atorvastatin 80 MG tablet    LIPITOR     Take 80 mg by mouth daily        FLUoxetine 40 MG capsule    PROzac    90 capsule    Take 1 capsule (40 mg) by mouth daily    Adjustment disorder with depressed mood       fluticasone-salmeterol 100-50 MCG/DOSE diskus inhaler    ADVAIR    1 Inhaler    Inhale 1 puff into the lungs 2 times daily as needed        glyBURIDE-metFORMIN 5-500 MG per tablet    GLUCOVANCE    360 tablet    2 tablets in the AM and 2 at PM.    Type 2 diabetes mellitus with hyperglycemia, without long-term current use of insulin (H)       ipratropium - albuterol 0.5 mg/2.5 mg/3 mL 0.5-2.5 (3) MG/3ML neb solution    DUONEB    360 mL    NEBULIZE CONTENTS OF ONE VIAL EVERY 4 HOURS AS NEEDED FOR SHORTNESS OF BREATH / DYSPNEA OR  WHEEZING    Chronic obstructive pulmonary disease with acute exacerbation (H), SOB (shortness of breath)       lisinopril 20 MG tablet    PRINIVIL/ZESTRIL    180 tablet    Take 2 tablets (40 mg) by mouth daily    Essential hypertension with goal blood pressure less than 140/90       metoprolol 50 MG tablet    LOPRESSOR    180 tablet    Take 1 tablet (50 mg) by mouth 2 times daily    Essential hypertension with goal blood pressure less than 140/90       NITROGLYCERIN SL      Place 0.4 mg under the tongue every 5 minutes as needed for chest pain        nystatin 830605 UNIT/GM Powd    MYCOSTATIN    30 g    Apply topically 3 times daily as needed    Candidiasis of skin       PLAVIX PO      Take 75 mg by mouth daily        tiotropium 18 MCG capsule    SPIRIVA HANDIHALER    90 capsule    USING THE HANDIHALER, INHALE THE CONTENTS OF ONE CAPSULE DAILY    Chronic obstructive pulmonary disease, unspecified COPD type (H)       VENTOLIN  (90 BASE) MCG/ACT Inhaler   Generic drug:  albuterol     18 g    INHALE 2 PUFFS INTO THE LUNGS EVERY 4 HOURS AS NEEDED FOR SHORTNESS OF BREATH, DIFFICULTY BREATHING OR WHEEZING.    Chronic obstructive pulmonary disease, unspecified COPD type (H)

## 2017-10-25 NOTE — PROGRESS NOTES
SUBJECTIVE:   Bernard Hughes is a 52 year old female who presents to clinic today for the following health issues:          Hospital Follow-up Visit:    Hospital/Nursing Home/IP Rehab Facility: [facility]  Date of Admission: 10/12/17  Date of Discharge: 10/16/17  Reason(s) for Admission: acute on chronic respiratory failure with hypoxia and hypercapnia            Problems taking medications regularly:  None       Medication changes since discharge: None       Problems adhering to non-medication therapy:  None    Summary of hospitalization:  See outside records, reviewed and scanned  Diagnostic Tests/Treatments reviewed.  Follow up needed: none  Other Healthcare Providers Involved in Patient s Care:         Homecare  Update since discharge: improved.     Post Discharge Medication Reconciliation: discharge medications reconciled and changed, per note/orders (see AVS).  Plan of care communicated with patient and family         She developed sob and had to be in the ICU on bipap and did ok, got back to her nasal cannula oxygen.  Feeling a little more congested today, more stress lately with her dog dying.   That just adds on to it.      Just finished the steroids and antibiotics.  Coughing up some sputum now the last day, green.  No fevers, some hot feelings.      Homecare for therapy, had OT today.    No more chest pains since the ER.     Right leg goes numb at times on the top of the leg.      duonebs do help her, doesn't need them every 4 hours, goes about 6 hours.  [facility] added advair, continue spiriva.      Sugars were up 300 with steroids, sugars were good before the ER and copd attack.      Gets anxious.needs something for occasional use.         Past Medical History:   Diagnosis Date     ASCVD (arteriosclerotic cardiovascular disease)     3 vessel bypass 3/2017     COPD (chronic obstructive pulmonary disease) (H)      Diabetes (H)      Hypertension      Kidney disease      Current Outpatient Prescriptions    Medication     atorvastatin (LIPITOR) 80 MG tablet     fluticasone-salmeterol (ADVAIR) 100-50 MCG/DOSE diskus inhaler     glyBURIDE-metFORMIN (GLUCOVANCE) 5-500 MG per tablet     VENTOLIN  (90 BASE) MCG/ACT Inhaler     nystatin (MYCOSTATIN) 348838 UNIT/GM POWD     ipratropium - albuterol 0.5 mg/2.5 mg/3 mL (DUONEB) 0.5-2.5 (3) MG/3ML neb solution     tiotropium (SPIRIVA HANDIHALER) 18 MCG capsule     lisinopril (PRINIVIL/ZESTRIL) 20 MG tablet     FLUoxetine (PROZAC) 40 MG capsule     NITROGLYCERIN SL     Clopidogrel Bisulfate (PLAVIX PO)     metoprolol (LOPRESSOR) 50 MG tablet     aspirin 81 MG EC tablet     No current facility-administered medications for this visit.      Social History   Substance Use Topics     Smoking status: Former Smoker     Smokeless tobacco: Never Used     Alcohol use Not on file     Review of Systems  Constitutional-No fevers, chills, or weight changes..  ENT-No earpain, sore throat, voice changes or rhinitis.  Cardiac-No chest pain or palpitations and Exertional SOB.  Respiratory-Sputum production and SOB.  Psych-anxious.      Physical Exam  /74  Pulse 68  Temp 98.6  F (37  C) (Temporal)  Resp 16  Wt 190 lb (86.2 kg)  SpO2 93%  BMI 34.75 kg/m2  General Appearance-alert, mild distress  Cardiac-regular rate and rhythm  with normal S1, S2 ; no murmur, rub or gallops  Lungs-decreased breath sounds with some crackles at the bases  Extremities-1+ pitting edema      ASSESSMENT:  Patient with severe COPD. She is here for recheck after recent hospitalization. She was COPD exacerbation with a high carbon Dioxide level, low oxygen and BiPAP therapy. She was in intensive care unit for 4 days. Discharged home on oral antibiotics and prednisone.    She is doing better but has some congestion in her lungs. We will check a chest x-ray today as she has a few crackles at the base. She is possibly Fluid Overload. Please Had More Stress Recently with Her Dog dying.      Her diabetes is  doing ok.  Higher with steroids, continue oral medications.      Patient does have anxiety and uses Prozac. Her breathing he will have more exacerbations. Her Xanax to use twice a week but not anymore.  Electronically signed by Dorian Cade MD

## 2017-10-28 ENCOUNTER — TRANSFERRED RECORDS (OUTPATIENT)
Dept: HEALTH INFORMATION MANAGEMENT | Facility: CLINIC | Age: 52
End: 2017-10-28

## 2017-10-28 LAB
CREAT SERPL-MCNC: 0.85 MG/DL (ref 0.57–1.11)
EJECTION FRACTION: 63
GFR SERPL CREATININE-BSD FRML MDRD: >60 ML/MIN/1.73M2
GLUCOSE SERPL-MCNC: 252 MG/DL (ref 65–100)
POTASSIUM SERPL-SCNC: 4.5 MMOL/L (ref 3.5–5)

## 2017-11-02 LAB — GLUCOSE SERPL-MCNC: 223 MG/DL (ref 65–100)

## 2017-11-03 ENCOUNTER — TELEPHONE (OUTPATIENT)
Dept: INTERNAL MEDICINE | Facility: CLINIC | Age: 52
End: 2017-11-03

## 2017-11-06 ENCOUNTER — OFFICE VISIT (OUTPATIENT)
Dept: FAMILY MEDICINE | Facility: OTHER | Age: 52
End: 2017-11-06
Payer: COMMERCIAL

## 2017-11-06 ENCOUNTER — TELEPHONE (OUTPATIENT)
Dept: FAMILY MEDICINE | Facility: CLINIC | Age: 52
End: 2017-11-06

## 2017-11-06 VITALS
SYSTOLIC BLOOD PRESSURE: 130 MMHG | TEMPERATURE: 98.7 F | DIASTOLIC BLOOD PRESSURE: 68 MMHG | RESPIRATION RATE: 20 BRPM | WEIGHT: 174 LBS | HEART RATE: 68 BPM | OXYGEN SATURATION: 94 % | BODY MASS INDEX: 31.83 KG/M2

## 2017-11-06 DIAGNOSIS — I10 ESSENTIAL HYPERTENSION WITH GOAL BLOOD PRESSURE LESS THAN 140/90: ICD-10-CM

## 2017-11-06 DIAGNOSIS — J44.9 CHRONIC OBSTRUCTIVE PULMONARY DISEASE, UNSPECIFIED COPD TYPE (H): Primary | ICD-10-CM

## 2017-11-06 DIAGNOSIS — I25.10 CORONARY ARTERY DISEASE INVOLVING NATIVE CORONARY ARTERY OF NATIVE HEART, ANGINA PRESENCE UNSPECIFIED: ICD-10-CM

## 2017-11-06 DIAGNOSIS — J14 PNEUMONIA DUE TO HAEMOPHILUS INFLUENZAE, UNSPECIFIED LATERALITY, UNSPECIFIED PART OF LUNG (H): ICD-10-CM

## 2017-11-06 PROCEDURE — 99214 OFFICE O/P EST MOD 30 MIN: CPT | Performed by: NURSE PRACTITIONER

## 2017-11-06 RX ORDER — GUAIFENESIN 600 MG/1
600 TABLET, EXTENDED RELEASE ORAL 2 TIMES DAILY
Qty: 40 TABLET | COMMUNITY
Start: 2017-11-02 | End: 2018-02-20

## 2017-11-06 RX ORDER — PREDNISONE 10 MG/1
TABLET ORAL
COMMUNITY
Start: 2017-11-06 | End: 2017-12-19

## 2017-11-06 RX ORDER — FUROSEMIDE 20 MG
20 TABLET ORAL 2 TIMES DAILY
Qty: 30 TABLET | Refills: 0 | COMMUNITY
Start: 2017-11-02 | End: 2017-12-19

## 2017-11-06 RX ORDER — LEVOFLOXACIN 500 MG/1
500 TABLET, FILM COATED ORAL DAILY
Qty: 7 TABLET | Refills: 0 | COMMUNITY
Start: 2017-10-31 | End: 2017-12-19

## 2017-11-06 NOTE — TELEPHONE ENCOUNTER
Reason for Call:  Other FYI    Detailed comments: Jeanine from [facility] wanted to let you know that Ariadna's PHQ2 score from Friday was a 6. Ariadna has an appointment in [place] today with Vicki Pelletier.    Phone Number Patient can be reached at: Other phone number:  [phone]*    Best Time:     Can we leave a detailed message on this number? YES    Call taken on 11/6/2017 at 8:08 AM by Daniella Castle

## 2017-11-06 NOTE — PROGRESS NOTES
SUBJECTIVE:   Bernard Hughes is a 52 year old female who presents to clinic today for the following health issues:      New Patient/Transfer of Care  COPD      Hospital Follow-up Visit:    Hospital/Nursing Home/ Rehab Facility: [facility]  Date of Admission: 10/12 & 10/26/17  Date of Discharge: 10/16 &  11/2  Reason(s) for Admission: COPD and pneumonia            Problems taking medications regularly:  None       Medication changes since discharge: None       Problems adhering to non-medication therapy:  None    Summary of hospitalization:  CareEverywhere information obtained and reviewed  Diagnostic Tests/Treatments reviewed.  Follow up needed: pulmonology and cardiology with repeat labs  Other Healthcare Providers Involved in Patient s Care:         Homecare  Update since discharge: improved.     Post Discharge Medication Reconciliation: discharge medications reconciled, continue medications without change.  Plan of care communicated with patient and family     Coding guidelines for this visit:  Type of Medical   Decision Making Face-to-Face Visit       within 7 Days of discharge Face-to-Face Visit        within 14 days of discharge   Moderate Complexity [id number]   High Complexity [id number]          Excerpt from hospital discharge note:     Landry Romero MD - 11/02/2017 11:32 AM CDT  Formatting of this note may be different from the original.  ADMIT DATE: 10/28/2017  DISCHARGE DATE:   DATE OF SERVICE: 11/02/17    DISCHARGE DIAGNOSIS:  Acute on chronic respiratory failure with hypoxia and hypercapnia, in the context of severe emphysema/COPD and recent pneumonia secondary to Streptococcus pneumoniae and Haemophilus influenzae.    SUMMARY:  This is a 52-year-old female with underlying severe emphysema/COPD, FEV1 of 0.99 liters in March of 2017, with chronic hypoxia, on home 3 liters of oxygen. She was admitted with acute on chronic respiratory failure with significant hypoxia and hypercapnia,  "following hospitalization with pneumonia secondary to Streptococcus pneumoniae and Haemophilus influenzae at [facility] earlier in October of 2017. The patient required and responded to BiPAP, and BiPAP equipment for home use was prescribed by consulting pulmonologist. Empiric antibiotics were provided and the patient completed a course of oral levofloxacin at discharge (sputum cultures from this admission remained negative) A CT chest obtained on 10/31/2017 revealed that the reticulonodular infiltrates in bilateral upper lobes and in the posterior left lower lobe showed significant interval resolution and that the previously identified nodule in the left upper lobe was no longer visible. Furosemide was added to the patient's regimen of lisinopril and metoprolol due to mild peripheral edema and hypertension; an inpatient echocardiogram showed normal LV function, EF 60-65%, and no right-sided heart failure.    FOLLOW-UP  Short-term in pulmonary clinic with Dr. Diamond or midlevel provider, and in Cardiology clinic with Dr. Grant or associate, with BMP. Otherwise routine followup through primary clinic.    Since discharge she states she feels \"awesome\" and is much improved.  Still on Levaquin, has 3 days left on this.  No fevers.  On home oxygen.  Using her inhalers as prescribed.  She started on oral Lasix while hospitalized and is quite please with how much fluid she has lost from her lower extremities.  Reports no swelling today.  Breathing is stable, denies significant dyspnea or wheezing.  Has follow up appointments already set up for next week with pulmonology and cardiology through [facility] and she is planning on keeping those.  She will have a BMP check with the cardiology visit.   She has been off her Lisinopril since hospital DC and was told to stay off that until after the BMP was rechecked.   She has not concerns for me today.           Problem list and histories reviewed & adjusted, as " indicated.  Additional history: none    Patient Active Problem List   Diagnosis     COPD (chronic obstructive pulmonary disease) (H)     Essential hypertension with goal blood pressure less than 140/90     Type 2 diabetes mellitus with hyperglycemia, without long-term current use of insulin (H)     Coronary artery disease involving native coronary artery of native heart, angina presence unspecified     Hyperlipidemia LDL goal <100     Recurrent major depressive disorder, remission status unspecified (H)     Past Surgical History:   Procedure Laterality Date     ceserean         Social History   Substance Use Topics     Smoking status: Former Smoker     Smokeless tobacco: Never Used     Alcohol use No     History reviewed. No pertinent family history.      Current Outpatient Prescriptions   Medication Sig Dispense Refill     levofloxacin (LEVAQUIN) 500 MG tablet Take 1 tablet (500 mg) by mouth daily 7 tablet 0     furosemide (LASIX) 20 MG tablet Take 1 tablet (20 mg) by mouth 2 times daily 30 tablet 0     predniSONE (DELTASONE) 10 MG tablet Take 4 pills for 4 days, 3 pills for 4 days, 2 pills for 4 days, 1 pill for 4 days; then follow up with pulmonologist.       guaiFENesin (MUCINEX) 600 MG 12 hr tablet Take 1 tablet (600 mg) by mouth 2 times daily 40 tablet      atorvastatin (LIPITOR) 80 MG tablet Take 80 mg by mouth daily       ALPRAZolam (XANAX) 0.5 MG tablet Take 1 tablet (0.5 mg) by mouth 3 times daily as needed for anxiety (To use no more then twice a week.) 30 tablet 0     fluticasone-salmeterol (ADVAIR) 100-50 MCG/DOSE diskus inhaler Inhale 1 puff into the lungs 2 times daily as needed 1 Inhaler 1     glyBURIDE-metFORMIN (GLUCOVANCE) 5-500 MG per tablet 2 tablets in the AM and 2 at PM. 360 tablet 3     VENTOLIN  (90 BASE) MCG/ACT Inhaler INHALE 2 PUFFS INTO THE LUNGS EVERY 4 HOURS AS NEEDED FOR SHORTNESS OF BREATH, DIFFICULTY BREATHING OR WHEEZING. 18 g 11     nystatin (MYCOSTATIN) 725092 UNIT/GM POWD  Apply topically 3 times daily as needed 30 g 1     ipratropium - albuterol 0.5 mg/2.5 mg/3 mL (DUONEB) 0.5-2.5 (3) MG/3ML neb solution NEBULIZE CONTENTS OF ONE VIAL EVERY 4 HOURS AS NEEDED FOR SHORTNESS OF BREATH / DYSPNEA OR WHEEZING 360 mL 6     tiotropium (SPIRIVA HANDIHALER) 18 MCG capsule USING THE HANDIHALER, INHALE THE CONTENTS OF ONE CAPSULE DAILY 90 capsule 3     FLUoxetine (PROZAC) 40 MG capsule Take 1 capsule (40 mg) by mouth daily 90 capsule 3     NITROGLYCERIN SL Place 0.4 mg under the tongue every 5 minutes as needed for chest pain       Clopidogrel Bisulfate (PLAVIX PO) Take 75 mg by mouth daily       metoprolol (LOPRESSOR) 50 MG tablet Take 1 tablet (50 mg) by mouth 2 times daily 180 tablet 3     aspirin 81 MG EC tablet Take 1 tablet (81 mg) by mouth daily 90 tablet 3     lisinopril (PRINIVIL/ZESTRIL) 20 MG tablet Take 2 tablets (40 mg) by mouth daily 180 tablet 3     Allergies   Allergen Reactions     Xanax [Alprazolam] Other (See Comments)     Agitation     BP Readings from Last 3 Encounters:   11/06/17 130/68   10/25/17 138/74   09/29/17 168/64    Wt Readings from Last 3 Encounters:   11/06/17 174 lb (78.9 kg)   10/25/17 190 lb (86.2 kg)   09/29/17 171 lb (77.6 kg)                        Reviewed and updated as needed this visit by clinical staffTobacco  Allergies  Meds  Med Hx  Surg Hx  Fam Hx  Soc Hx      Reviewed and updated as needed this visit by Provider         ROS:  C: NEGATIVE for fever, chills, change in weight  E/M: NEGATIVE for ear, mouth and throat problems  R: NEGATIVE for significant cough or SOB  CV: NEGATIVE for chest pain, palpitations or peripheral edema  GI: NEGATIVE for nausea, abdominal pain, heartburn, or change in bowel habits  MUSCULOSKELETAL: NEGATIVE for significant arthralgias or myalgia    OBJECTIVE:     /68  Pulse 68  Temp 98.7  F (37.1  C) (Tympanic)  Resp 20  Wt 174 lb (78.9 kg)  SpO2 94%  Breastfeeding? No  BMI 31.83 kg/m2  Body mass index is  31.83 kg/(m^2).  GENERAL: alert and no distress  NECK: no adenopathy, no asymmetry, masses, or scars and thyroid normal to palpation  RESP: no rales , no rhonchi, no wheezes and decreased breath sounds throughout  CV: regular rate and rhythm, normal S1 S2, no S3 or S4, no murmur, click or rub, no peripheral edema and peripheral pulses strong  MS: no gross musculoskeletal defects noted, no edema    Diagnostic Test Results:  none     ASSESSMENT/PLAN:         1. Chronic obstructive pulmonary disease, unspecified COPD type (H)  Chronic, controlled.  No change in treatment plan.  Has follow up with pulmonology scheduled for next week already.     2. Pneumonia due to Haemophilus influenzae, unspecified laterality, unspecified part of lung (H)  Resolving, finish Levaquin as prescribed.     3. Coronary artery disease involving native coronary artery of native heart, angina presence unspecified  Chronic, controlled.  No change in treatment plan.  Has follow up with cardiology scheduled for next week.  Will keep her off her Lisinopril until they can address this.  Needs repeat BMP done at that visit.     4. Essential hypertension with goal blood pressure less than 140/90  Chronic, controlled.  No change in treatment plan.  Has follow up with cardiology scheduled for next week.  Will keep her off her Lisinopril until they can address this.  Needs repeat BMP done at that visit.       See Patient Instructions    ADAM Watson [facility]

## 2017-11-06 NOTE — PATIENT INSTRUCTIONS
They will recheck your labs when you see the cardiology clinic next week.     Stay off the Lisinopril for now.  After they check your labs they will let you know about that.     See the Pulmonary clinic as scheduled.     Stay on all the same medications for now.

## 2017-11-06 NOTE — NURSING NOTE
"Chief Complaint   Patient presents with     Hospital F/U     [facility], COPD and pneumonia       Initial /68  Pulse 68  Temp 98.7  F (37.1  C) (Tympanic)  Resp 20  Wt 174 lb (78.9 kg)  SpO2 94%  Breastfeeding? No  BMI 31.83 kg/m2 Estimated body mass index is 31.83 kg/(m^2) as calculated from the following:    Height as of 3/1/17: 5' 2\" (1.575 m).    Weight as of this encounter: 174 lb (78.9 kg).  Medication Reconciliation: complete   ................Monroe Garrett LPN,   November 6, 2017,      11:51 AM,   [facility]    "

## 2017-11-06 NOTE — MR AVS SNAPSHOT
"              After Visit Summary   11/6/2017    Bernard Hughes    MRN: [id number]           Patient Information     Date Of Birth          1965        Visit Information        Provider Department      11/6/2017 11:20 AM Vicki Pelletier APRN CNP [facility]        Today's Diagnoses     Chronic obstructive pulmonary disease, unspecified COPD type (H)    -  1      Care Instructions    They will recheck your labs when you see the cardiology clinic next week.     Stay off the Lisinopril for now.  After they check your labs they will let you know about that.     See the Pulmonary clinic as scheduled.     Stay on all the same medications for now.               Follow-ups after your visit        Who to contact     If you have questions or need follow up information about today's clinic visit or your schedule please contact [facility] directly at [phone].  Normal or non-critical lab and imaging results will be communicated to you by VEASYThart, letter or phone within 4 business days after the clinic has received the results. If you do not hear from us within 7 days, please contact the clinic through VEASYThart or phone. If you have a critical or abnormal lab result, we will notify you by phone as soon as possible.  Submit refill requests through Ansible or call your pharmacy and they will forward the refill request to us. Please allow 3 business days for your refill to be completed.          Additional Information About Your Visit        MyChart Information     Ansible lets you send messages to your doctor, view your test results, renew your prescriptions, schedule appointments and more. To sign up, go to [email or web address] . Click on \"Log in\" on the left side of the screen, which will take you to the Welcome page. Then click on \"Sign up Now\" on the right side of the page.     You will be asked to enter the access code listed below, as well as some personal information. Please follow " the directions to create your username and password.     Your access code is: [email or web address]  Expires: 2018  1:12 PM     Your access code will  in 90 days. If you need help or a new code, please call your [place] clinic or [phone].        Care EveryWhere ID     This is your Care EveryWhere ID. This could be used by other organizations to access your [place] medical records  [id number]        Your Vitals Were     Pulse Temperature Respirations Pulse Oximetry Breastfeeding? BMI (Body Mass Index)    68 98.7  F (37.1  C) (Tympanic) 20 94% No 31.83 kg/m2       Blood Pressure from Last 3 Encounters:   17 130/68   10/25/17 138/74   17 168/64    Weight from Last 3 Encounters:   17 174 lb (78.9 kg)   10/25/17 190 lb (86.2 kg)   17 171 lb (77.6 kg)              Today, you had the following     No orders found for display       Primary Care Provider Office Phone # Fax #    Dorian Cade [phone]       [facility] [address] [place] [address]        Equal Access to Services     SUSIE MOLINA AH: Hadii aad ku hadasho Soomaali, waaxda luqadaha, qaybta kaalmada adeegyada, waxay idiin hayaan felice lea laleonor chiang. So [facility] [phone].    ATENCIÓN: Si habla español, tiene a hitchcock disposición servicios gratuitos de asistencia lingüística. Llame al [phone].    We comply with applicable federal civil rights laws and [place] laws. We do not discriminate on the basis of race, color, national origin, age, disability, sex, sexual orientation, or gender identity.            Thank you!     Thank you for choosing [facility]  for your care. Our goal is always to provide you with excellent care. Hearing back from our patients is one way we can continue to improve our services. Please take a few minutes to complete the written survey that you may receive in the mail after your visit with us. Thank you!             Your Updated Medication List -  Protect others around you: Learn how to safely use, store and throw away your medicines at www.disposemymeds.org.          This list is accurate as of: 11/6/17 12:08 PM.  Always use your most recent med list.                   Brand Name Dispense Instructions for use Diagnosis    ALPRAZolam 0.5 MG tablet    XANAX    30 tablet    Take 1 tablet (0.5 mg) by mouth 3 times daily as needed for anxiety (To use no more then twice a week.)    Adjustment disorder with anxious mood, Chronic obstructive pulmonary disease, unspecified COPD type (H)       aspirin 81 MG EC tablet     90 tablet    Take 1 tablet (81 mg) by mouth daily    Type 2 diabetes, HbA1C goal < 8% (H), Hypertension goal BP (blood pressure) < 140/90       atorvastatin 80 MG tablet    LIPITOR     Take 80 mg by mouth daily        FLUoxetine 40 MG capsule    PROzac    90 capsule    Take 1 capsule (40 mg) by mouth daily    Adjustment disorder with depressed mood       fluticasone-salmeterol 100-50 MCG/DOSE diskus inhaler    ADVAIR    1 Inhaler    Inhale 1 puff into the lungs 2 times daily as needed        glyBURIDE-metFORMIN 5-500 MG per tablet    GLUCOVANCE    360 tablet    2 tablets in the AM and 2 at PM.    Type 2 diabetes mellitus with hyperglycemia, without long-term current use of insulin (H)       ipratropium - albuterol 0.5 mg/2.5 mg/3 mL 0.5-2.5 (3) MG/3ML neb solution    DUONEB    360 mL    NEBULIZE CONTENTS OF ONE VIAL EVERY 4 HOURS AS NEEDED FOR SHORTNESS OF BREATH / DYSPNEA OR WHEEZING    Chronic obstructive pulmonary disease with acute exacerbation (H), SOB (shortness of breath)       LASIX 20 MG tablet   Generic drug:  furosemide     30 tablet    Take 1 tablet (20 mg) by mouth 2 times daily        levofloxacin 500 MG tablet    LEVAQUIN    7 tablet    Take 1 tablet (500 mg) by mouth daily        lisinopril 20 MG tablet    PRINIVIL/ZESTRIL    180 tablet    Take 2 tablets (40 mg) by mouth daily    Essential hypertension with goal blood pressure less  than 140/90       metoprolol 50 MG tablet    LOPRESSOR    180 tablet    Take 1 tablet (50 mg) by mouth 2 times daily    Essential hypertension with goal blood pressure less than 140/90       MUCINEX 600 MG 12 hr tablet   Generic drug:  guaiFENesin     40 tablet    Take 1 tablet (600 mg) by mouth 2 times daily        NITROGLYCERIN SL      Place 0.4 mg under the tongue every 5 minutes as needed for chest pain        nystatin 079284 UNIT/GM Powd    MYCOSTATIN    30 g    Apply topically 3 times daily as needed    Candidiasis of skin       PLAVIX PO      Take 75 mg by mouth daily        predniSONE 10 MG tablet    DELTASONE     Take 4 pills for 4 days, 3 pills for 4 days, 2 pills for 4 days, 1 pill for 4 days; then follow up with pulmonologist.        tiotropium 18 MCG capsule    SPIRIVA HANDIHALER    90 capsule    USING THE HANDIHALER, INHALE THE CONTENTS OF ONE CAPSULE DAILY    Chronic obstructive pulmonary disease, unspecified COPD type (H)       VENTOLIN  (90 BASE) MCG/ACT Inhaler   Generic drug:  albuterol     18 g    INHALE 2 PUFFS INTO THE LUNGS EVERY 4 HOURS AS NEEDED FOR SHORTNESS OF BREATH, DIFFICULTY BREATHING OR WHEEZING.    Chronic obstructive pulmonary disease, unspecified COPD type (H)

## 2017-11-08 ENCOUNTER — TELEPHONE (OUTPATIENT)
Dept: FAMILY MEDICINE | Facility: CLINIC | Age: 52
End: 2017-11-08

## 2017-11-08 DIAGNOSIS — R26.2 DIFFICULTY WALKING: ICD-10-CM

## 2017-11-08 DIAGNOSIS — J44.9 CHRONIC OBSTRUCTIVE PULMONARY DISEASE, UNSPECIFIED COPD TYPE (H): Primary | ICD-10-CM

## 2017-11-08 NOTE — TELEPHONE ENCOUNTER
"Reason for Call: Request for an order or referral:    Order or referral being requested: Physical Therapy orders 2 times per week for 3 weeks for strengthening - verbal needed    Date needed: as soon as possible    Has the patient been seen by the PCP for this problem? YES    Additional comments: Noah needs a verbal order for physical therapy orders    Phone number Patient can be reached at:  Other phone number:  [phone]*    Best Time:      Can we leave a detailed message on this number?  YES    Reason for Call: Request for an order or referral:    Order or referral being requested: 2 wheel walker with rear ski glides  - height is 5'4\" 173 lbs, diagnosis - COPD & difficulty walking    Date needed: as soon as possible    Has the patient been seen by the PCP for this problem? YES    Additional comments: Fax to [facility] [phone]    Phone number Patient can be reached at:  Other phone number:  [phone]*    Best Time:      Can we leave a detailed message on this number?  YES    Call taken on 11/8/2017 at 1:03 PM by Daniella Castle    Call taken on 11/8/2017 at 1:01 PM by Daniella Castle    "

## 2017-11-10 NOTE — TELEPHONE ENCOUNTER
[facility] . She stated that PT Blood sugars are running low the last couple days . They have been 59-55-50. If any questions please call her back at  [phone]

## 2017-11-15 ENCOUNTER — TRANSFERRED RECORDS (OUTPATIENT)
Dept: HEALTH INFORMATION MANAGEMENT | Facility: CLINIC | Age: 52
End: 2017-11-15

## 2017-11-22 ENCOUNTER — TELEPHONE (OUTPATIENT)
Dept: FAMILY MEDICINE | Facility: CLINIC | Age: 52
End: 2017-11-22

## 2017-11-22 NOTE — TELEPHONE ENCOUNTER
Bernard Hughes is a 52 year old female who calls with blood on her eyeball today, and a headache yesterday. Her Home Care Nurse is calling for her.    NURSING ASSESSMENT:  Description:  Blood on eyeball-broken blood vessel, also a  headache yesterday.  Onset/duration:  today  Precip. factors:  Fell on 11/17, hit head. Hx of frequent headaches.  Associated symptoms:   Improves/worsens symptoms:  None-no headache today.  Allergies:   Allergies   Allergen Reactions     Xanax [Alprazolam] Other (See Comments)     Agitation     NURSING PLAN: Nursing advice to patient monitor symptoms Will contact us with any changes.     RECOMMENDED DISPOSITION:  Home care advice -   Subconjunctival Hemorrhage    A subconjunctival hemorrhage is a result of a broken blood vessel in the white part of the eye. It is usually painless and may be caused by coughing, sneezing, or vomiting. An injury to the eye can cause this. It can also be a sign of high blood pressure (hypertension) or a bleeding disorder.  This can look frightening. But the presence of the blood is not serious. The blood will be reabsorbed without treatment within 2 to 3 weeks.  Home care  You may continue your usual activities.  Follow-up care  Follow up with your healthcare provider, or as advised.  When to seek medical advice  Contact your healthcare provider right away if any of these occur:    Pain in the eye    Change in vision    The blood does not go away within 3 weeks    Increasing redness or swelling of the eye    Severe headache or dizziness    Signs of bruising or bleeding from other parts of your body  Date Last Reviewed: 8/1/2017 2000-2017 [employer]. [address] [place] [address], [place] [address]. All rights reserved. This information is not intended as a substitute for professional medical care. Always follow your healthcare professional's instructions.             Will comply with recommendation: Yes  If further questions/concerns or if  symptoms do not improve, worsen or new symptoms develop, call your PCP or [facility] as soon as possible.      Guideline used:  Subconjunctival hemorrhageClinical References    Aleah Hughes RN

## 2017-11-29 ENCOUNTER — TELEPHONE (OUTPATIENT)
Dept: FAMILY MEDICINE | Facility: CLINIC | Age: 52
End: 2017-11-29

## 2017-11-29 DIAGNOSIS — J44.0 CHRONIC OBSTRUCTIVE PULMONARY DISEASE WITH ACUTE LOWER RESPIRATORY INFECTION (H): Primary | ICD-10-CM

## 2017-11-29 RX ORDER — AZITHROMYCIN 250 MG/1
TABLET, FILM COATED ORAL
Qty: 6 TABLET | Refills: 0 | Status: SHIPPED | OUTPATIENT
Start: 2017-11-29 | End: 2017-12-19

## 2017-11-29 NOTE — TELEPHONE ENCOUNTER
Bernard Hughes is a 52 year old female who calls with concerns about possible pneumonia.  Patient reports that she was discharged from [facility] about 2 weeks ago, reports she was admitted for a few days with pneumonia.  Patient reports that that productive green cough started yesterday.  Reports that phlegm is thick and green.  Reports nasal congestion and thick green nasal drainage.  Patient reports slight increase to her shortness of breath.  Increase in weakness.  Patient denies fevers.  She is getting fluids, but not a great appetite.  Reports that she is using neb treatments regularly during the day, this is semi-effective for her.  She is currently using 3L O2, but reports her congestion is bad and this is not working great for her.  She is requesting meds from Dr. Cade, she does not want to be seen in the ED or hospital.     NURSING ASSESSMENT:  Description:  Productive cough.   Onset/duration:  Yesterday.   Precip. factors:  COPD, recent hospital stay.   Associated symptoms:  Nasal drainage, congestion, weakness.   Improves/worsens symptoms:  Neb treatments semi-effective.   Pain scale (0-10)   6/10  Last exam/Treatment:  11/06/2017  Allergies:   Allergies   Allergen Reactions     Xanax [Alprazolam] Other (See Comments)     Agitation     NURSING PLAN: Routed to provider Yes    RECOMMENDED DISPOSITION:  Patient is requesting PCP send script for her to treat at home, she does not want to go into the ED or hospital.    Will comply with recommendation: Yes  If further questions/concerns or if symptoms do not improve, worsen or new symptoms develop, call your PCP or [facility] as soon as possible.    Guideline used:  Cough  Telephone Triage Protocols for Nurses, Fifth Edition, Reena Salas RN

## 2017-11-29 NOTE — TELEPHONE ENCOUNTER
Called her back and she is feeling kind of congested and green again for the last day or two. Was last treated 3 weeks ago.  Breathing is ok with oxygen, just worried and wants to get a head.  No fevers.      Will treat with a zpak now.

## 2017-12-05 ENCOUNTER — TELEPHONE (OUTPATIENT)
Dept: FAMILY MEDICINE | Facility: CLINIC | Age: 52
End: 2017-12-05

## 2017-12-05 DIAGNOSIS — I10 ESSENTIAL HYPERTENSION WITH GOAL BLOOD PRESSURE LESS THAN 140/90: ICD-10-CM

## 2017-12-05 DIAGNOSIS — J44.0 CHRONIC OBSTRUCTIVE PULMONARY DISEASE WITH ACUTE LOWER RESPIRATORY INFECTION (H): Primary | ICD-10-CM

## 2017-12-05 DIAGNOSIS — E11.65 TYPE 2 DIABETES MELLITUS WITH HYPERGLYCEMIA, WITHOUT LONG-TERM CURRENT USE OF INSULIN (H): ICD-10-CM

## 2017-12-05 DIAGNOSIS — F33.9 RECURRENT MAJOR DEPRESSIVE DISORDER, REMISSION STATUS UNSPECIFIED (H): ICD-10-CM

## 2017-12-05 DIAGNOSIS — I25.10 CORONARY ARTERY DISEASE INVOLVING NATIVE CORONARY ARTERY OF NATIVE HEART, ANGINA PRESENCE UNSPECIFIED: ICD-10-CM

## 2017-12-05 NOTE — TELEPHONE ENCOUNTER
Reason for Call: Request for an order or referral:    Order or referral being requested: Home Care Physical Therapy    Date needed: as soon as possible    Has the patient been seen by the PCP for this problem? YES    Additional comments: [facility] asking for order for visits 2 times per week for 2 weeks.    Phone number Patient can be reached at:  Other phone number:  [phone]*    Best Time:  any    Can we leave a detailed message on this number?  YES    Call taken on 12/5/2017 at 10:27 AM by Jaymie Clark

## 2017-12-18 ENCOUNTER — TELEPHONE (OUTPATIENT)
Dept: INTERNAL MEDICINE | Facility: CLINIC | Age: 52
End: 2017-12-18

## 2017-12-18 NOTE — TELEPHONE ENCOUNTER
Reason for call:  Patient reporting a symptom    Symptom or request: Pt states she is starting to have URI symptoms again.  Voice is changing, sore scratchy throat, sinus's bothering her, coughing up stuff again    Duration (how long have symptoms been present): overnight    Have you been treated for this before? Yes    Additional comments: Pt is afraid of getting pneumonia again after having it twice in October.  There are no immediate appts with Dr Cade and she is wondering what to do.  Wants Dr Cade's opinion before she makes an appt with someone else.    Phone Number patient can be reached at:  [phone]    Best Time:  any    Can we leave a detailed message on this number:  YES    Call taken on 12/18/2017 at 2:22 PM by Annamarie Tan

## 2017-12-19 ENCOUNTER — OFFICE VISIT (OUTPATIENT)
Dept: INTERNAL MEDICINE | Facility: CLINIC | Age: 52
End: 2017-12-19
Payer: COMMERCIAL

## 2017-12-19 VITALS
WEIGHT: 186 LBS | RESPIRATION RATE: 16 BRPM | OXYGEN SATURATION: 90 % | SYSTOLIC BLOOD PRESSURE: 152 MMHG | HEART RATE: 70 BPM | DIASTOLIC BLOOD PRESSURE: 72 MMHG | TEMPERATURE: 99 F | BODY MASS INDEX: 34.02 KG/M2

## 2017-12-19 DIAGNOSIS — J44.1 COPD EXACERBATION (H): Primary | ICD-10-CM

## 2017-12-19 DIAGNOSIS — I50.9 CHRONIC CONGESTIVE HEART FAILURE, UNSPECIFIED CONGESTIVE HEART FAILURE TYPE: ICD-10-CM

## 2017-12-19 DIAGNOSIS — I25.10 CORONARY ARTERY DISEASE INVOLVING NATIVE CORONARY ARTERY OF NATIVE HEART, ANGINA PRESENCE UNSPECIFIED: ICD-10-CM

## 2017-12-19 PROCEDURE — 99213 OFFICE O/P EST LOW 20 MIN: CPT | Performed by: INTERNAL MEDICINE

## 2017-12-19 RX ORDER — FUROSEMIDE 20 MG
20 TABLET ORAL DAILY
Qty: 90 TABLET | Refills: 1 | Status: SHIPPED | OUTPATIENT
Start: 2017-12-19 | End: 2018-04-13

## 2017-12-19 RX ORDER — PREDNISONE 20 MG/1
20 TABLET ORAL DAILY
Qty: 5 TABLET | Refills: 0 | Status: SHIPPED | OUTPATIENT
Start: 2017-12-19 | End: 2018-02-20

## 2017-12-19 RX ORDER — DOXYCYCLINE 100 MG/1
100 CAPSULE ORAL 2 TIMES DAILY
Qty: 20 CAPSULE | Refills: 0 | Status: SHIPPED | OUTPATIENT
Start: 2017-12-19 | End: 2018-02-20

## 2017-12-19 ASSESSMENT — PAIN SCALES - GENERAL: PAINLEVEL: MODERATE PAIN (5)

## 2017-12-19 NOTE — NURSING NOTE
"Chief Complaint   Patient presents with     URI     chest congestion and cough     Nasal Congestion       Initial /72  Pulse 70  Temp 99  F (37.2  C) (Temporal)  Resp 16  Wt 186 lb (84.4 kg)  SpO2 90%  BMI 34.02 kg/m2 Estimated body mass index is 34.02 kg/(m^2) as calculated from the following:    Height as of 3/1/17: 5' 2\" (1.575 m).    Weight as of this encounter: 186 lb (84.4 kg).  Medication Reconciliation: complete    "

## 2017-12-19 NOTE — MR AVS SNAPSHOT
"              After Visit Summary   12/19/2017    Bernard Hughes    MRN: [id number]           Patient Information     Date Of Birth          1965        Visit Information        Provider Department      12/19/2017 2:15 PM Dorian Cade MD [facility]         Follow-ups after your visit        Your next 10 appointments already scheduled     Dec 19, 2017  2:15 PM CST   Office Visit with Dorian Cade MD   [facility]    [address] [place] [id number]   [phone]           Bring a current list of meds and any records pertaining to this visit. For Physicals, please bring immunization records and any forms needing to be filled out. Please arrive 10 minutes early to complete paperwork.              Who to contact     If you have questions or need follow up information about today's clinic visit or your schedule please contact [facility] directly at [phone].  Normal or non-critical lab and imaging results will be communicated to you by MyChart, letter or phone within 4 business days after the clinic has received the results. If you do not hear from us within 7 days, please contact the clinic through Propagenixhart or phone. If you have a critical or abnormal lab result, we will notify you by phone as soon as possible.  Submit refill requests through EyesBot or call your pharmacy and they will forward the refill request to us. Please allow 3 business days for your refill to be completed.          Additional Information About Your Visit        EyesBot Information     EyesBot lets you send messages to your doctor, view your test results, renew your prescriptions, schedule appointments and more. To sign up, go to [email or web address] . Click on \"Log in\" on the left side of the screen, which will take you to the Welcome page. Then click on \"Sign up Now\" on the right side of the page.     You will be asked to enter the access " code listed below, as well as some personal information. Please follow the directions to create your username and password.     Your access code is: [email or web address]  Expires: 2018  1:12 PM     Your access code will  in 90 days. If you need help or a new code, please call your [facility] or [phone].        Care EveryWhere ID     This is your Care EveryWhere ID. This could be used by other organizations to access your [place] medical records  [id number]        Your Vitals Were     Pulse Temperature Respirations Pulse Oximetry BMI (Body Mass Index)       70 99  F (37.2  C) (Temporal) 16 90% 34.02 kg/m2        Blood Pressure from Last 3 Encounters:   17 152/72   17 130/68   10/25/17 138/74    Weight from Last 3 Encounters:   17 186 lb (84.4 kg)   17 174 lb (78.9 kg)   10/25/17 190 lb (86.2 kg)              Today, you had the following     No orders found for display       Primary Care Provider Office Phone # Fax #    Dorian Cade [phone]       [facility] [address] [place] [address]        Equal Access to Services     USSIE MOLINA AH: Hadii aad ku hadasho Soomaali, waaxda luqadaha, qaybta kaalmada adeegyada, waxay idiin hayaan felice lea laleonor ah. So [facility] [phone].    ATENCIÓN: Si habla español, tiene a hitchcock disposición servicios gratuitos de asistencia lingüística. Llame al [phone].    We comply with applicable federal civil rights laws and [place] laws. We do not discriminate on the basis of race, color, national origin, age, disability, sex, sexual orientation, or gender identity.            Thank you!     Thank you for choosing [facility]  for your care. Our goal is always to provide you with excellent care. Hearing back from our patients is one way we can continue to improve our services. Please take a few minutes to complete the written survey that you may receive in the mail after your visit with  us. Thank you!             Your Updated Medication List - Protect others around you: Learn how to safely use, store and throw away your medicines at www.disposemymeds.org.          This list is accurate as of: 12/19/17  2:12 PM.  Always use your most recent med list.                   Brand Name Dispense Instructions for use Diagnosis    ALPRAZolam 0.5 MG tablet    XANAX    30 tablet    Take 1 tablet (0.5 mg) by mouth 3 times daily as needed for anxiety (To use no more then twice a week.)    Adjustment disorder with anxious mood, Chronic obstructive pulmonary disease, unspecified COPD type (H)       aspirin 81 MG EC tablet     90 tablet    Take 1 tablet (81 mg) by mouth daily    Type 2 diabetes, HbA1C goal < 8% (H), Hypertension goal BP (blood pressure) < 140/90       atorvastatin 80 MG tablet    LIPITOR     Take 80 mg by mouth daily        FLUoxetine 40 MG capsule    PROzac    90 capsule    Take 1 capsule (40 mg) by mouth daily    Adjustment disorder with depressed mood       fluticasone-salmeterol 100-50 MCG/DOSE diskus inhaler    ADVAIR    1 Inhaler    Inhale 1 puff into the lungs 2 times daily as needed        glyBURIDE-metFORMIN 5-500 MG per tablet    GLUCOVANCE    360 tablet    2 tablets in the AM and 2 at PM.    Type 2 diabetes mellitus with hyperglycemia, without long-term current use of insulin (H)       ipratropium - albuterol 0.5 mg/2.5 mg/3 mL 0.5-2.5 (3) MG/3ML neb solution    DUONEB    360 mL    NEBULIZE CONTENTS OF ONE VIAL EVERY 4 HOURS AS NEEDED FOR SHORTNESS OF BREATH / DYSPNEA OR WHEEZING    Chronic obstructive pulmonary disease with acute exacerbation (H), SOB (shortness of breath)       LASIX 20 MG tablet   Generic drug:  furosemide     30 tablet    Take 1 tablet (20 mg) by mouth 2 times daily        lisinopril 20 MG tablet    PRINIVIL/ZESTRIL    180 tablet    Take 2 tablets (40 mg) by mouth daily    Essential hypertension with goal blood pressure less than 140/90       metoprolol 50 MG tablet  "   LOPRESSOR    180 tablet    Take 1 tablet (50 mg) by mouth 2 times daily    Essential hypertension with goal blood pressure less than 140/90       MUCINEX 600 MG 12 hr tablet   Generic drug:  guaiFENesin     40 tablet    Take 1 tablet (600 mg) by mouth 2 times daily        NITROGLYCERIN SL      Place 0.4 mg under the tongue every 5 minutes as needed for chest pain        nystatin [id number] UNIT/GM Powd    MYCOSTATIN    30 g    Apply topically 3 times daily as needed    Candidiasis of skin       order for DME     1 Device    2 wheel walker with rear ski glides Height:  5'4\" Weight:  173lbs    Chronic obstructive pulmonary disease, unspecified COPD type (H), Difficulty walking       PLAVIX PO      Take 75 mg by mouth daily        tiotropium 18 MCG capsule    SPIRIVA HANDIHALER    90 capsule    USING THE HANDIHALER, INHALE THE CONTENTS OF ONE CAPSULE DAILY    Chronic obstructive pulmonary disease, unspecified COPD type (H)       VENTOLIN  (90 BASE) MCG/ACT Inhaler   Generic drug:  albuterol     18 g    INHALE 2 PUFFS INTO THE LUNGS EVERY 4 HOURS AS NEEDED FOR SHORTNESS OF BREATH, DIFFICULTY BREATHING OR WHEEZING.    Chronic obstructive pulmonary disease, unspecified COPD type (H)         "

## 2017-12-19 NOTE — PROGRESS NOTES
SUBJECTIVE:   Bernard Hughes is a 52 year old female who presents to clinic today for the following health issues:      Chief Complaint   Patient presents with     URI     chest congestion and cough     Nasal Congestion       Some scratchy throat and runny nose, oxygen level is down some, 90% on oxygen now.   Sputum is greenish.  Not feeling great.  She was feeling good before this.      Last illness was October in [facility].    Homecare ended last week.        We did a zpak on Nov 29th, was coughing green sputum then, it helped.  Did well for a week or two after the zpak.    Now worse and was around grand babies and now is sick.    Past Medical History:   Diagnosis Date     ASCVD (arteriosclerotic cardiovascular disease)     3 vessel bypass 3/2017     COPD (chronic obstructive pulmonary disease) (H)      Diabetes (H)      Hypertension      Kidney disease      Current Outpatient Prescriptions   Medication     atorvastatin (LIPITOR) 80 MG tablet     ALPRAZolam (XANAX) 0.5 MG tablet     fluticasone-salmeterol (ADVAIR) 100-50 MCG/DOSE diskus inhaler     VENTOLIN  (90 BASE) MCG/ACT Inhaler     nystatin (MYCOSTATIN) 225719 UNIT/GM POWD     ipratropium - albuterol 0.5 mg/2.5 mg/3 mL (DUONEB) 0.5-2.5 (3) MG/3ML neb solution     tiotropium (SPIRIVA HANDIHALER) 18 MCG capsule     lisinopril (PRINIVIL/ZESTRIL) 20 MG tablet     FLUoxetine (PROZAC) 40 MG capsule     Clopidogrel Bisulfate (PLAVIX PO)     metoprolol (LOPRESSOR) 50 MG tablet     aspirin 81 MG EC tablet     order for DME     furosemide (LASIX) 20 MG tablet     guaiFENesin (MUCINEX) 600 MG 12 hr tablet     glyBURIDE-metFORMIN (GLUCOVANCE) 5-500 MG per tablet     NITROGLYCERIN SL     No current facility-administered medications for this visit.      Physical Exam  /72  Pulse 70  Temp 99  F (37.2  C) (Temporal)  Resp 16  Wt 186 lb (84.4 kg)  SpO2 90%  BMI 34.02 kg/m2  General Appearance-alert, moderate distress  ENT-ENT exam normal, no neck nodes or  sinus tenderness  Cardiac-regular rate and rhythm  with normal S1, S2 ; no murmur, rub or gallops  Lungs-mild to moderate decreased air movement, mild to moderate expiratory rhonchi and mild to moderate expiratory wheezes    ASSESSMENT:    This is a 52-year-old woman who has severe COPD.  She had multiple exacerbations over the last year.  She was last hospitalized at [facility] at the end of October.  She was treated with Levaquin and improved.  She started to get sick again and mid to late November was given a Z-Maurilio and improved again.  Today she is having more cough, green sputum, some crackles and decreased breath sounds on examination.  I will treat her for a COPD exacerbation.  Due to the recent use of Levaquin and azithromycin we will change her to doxycycline 100 mg twice a day for 10 days.  We will also give her 5 days of low-dose prednisone 20 mg.    Electronically signed by Dorian Cade MD

## 2017-12-24 ENCOUNTER — HEALTH MAINTENANCE LETTER (OUTPATIENT)
Age: 52
End: 2017-12-24

## 2017-12-29 ENCOUNTER — TELEPHONE (OUTPATIENT)
Dept: FAMILY MEDICINE | Facility: CLINIC | Age: 52
End: 2017-12-29

## 2018-01-01 ENCOUNTER — HOSPITAL ENCOUNTER (OUTPATIENT)
Dept: GENERAL RADIOLOGY | Facility: CLINIC | Age: 53
Discharge: HOME | End: 2018-12-21
Attending: INTERNAL MEDICINE | Admitting: INTERNAL MEDICINE
Payer: COMMERCIAL

## 2018-01-01 ENCOUNTER — OFFICE VISIT (OUTPATIENT)
Dept: INTERNAL MEDICINE | Facility: CLINIC | Age: 53
End: 2018-01-01
Payer: COMMERCIAL

## 2018-01-01 ENCOUNTER — TELEPHONE (OUTPATIENT)
Dept: INTERNAL MEDICINE | Facility: CLINIC | Age: 53
End: 2018-01-01

## 2018-01-01 ENCOUNTER — TELEPHONE (OUTPATIENT)
Dept: FAMILY MEDICINE | Facility: CLINIC | Age: 53
End: 2018-01-01

## 2018-01-01 ENCOUNTER — TRANSFERRED RECORDS (OUTPATIENT)
Dept: HEALTH INFORMATION MANAGEMENT | Facility: CLINIC | Age: 53
End: 2018-01-01

## 2018-01-01 ENCOUNTER — MEDICAL CORRESPONDENCE (OUTPATIENT)
Dept: HEALTH INFORMATION MANAGEMENT | Facility: CLINIC | Age: 53
End: 2018-01-01

## 2018-01-01 VITALS
DIASTOLIC BLOOD PRESSURE: 72 MMHG | WEIGHT: 207.8 LBS | TEMPERATURE: 97.4 F | HEIGHT: 62 IN | OXYGEN SATURATION: 85 % | BODY MASS INDEX: 38.24 KG/M2 | RESPIRATION RATE: 16 BRPM | HEART RATE: 71 BPM | SYSTOLIC BLOOD PRESSURE: 144 MMHG

## 2018-01-01 VITALS
OXYGEN SATURATION: 93 % | BODY MASS INDEX: 36.76 KG/M2 | TEMPERATURE: 99 F | HEART RATE: 74 BPM | WEIGHT: 201 LBS | RESPIRATION RATE: 16 BRPM | SYSTOLIC BLOOD PRESSURE: 184 MMHG | DIASTOLIC BLOOD PRESSURE: 82 MMHG

## 2018-01-01 VITALS
DIASTOLIC BLOOD PRESSURE: 84 MMHG | SYSTOLIC BLOOD PRESSURE: 156 MMHG | RESPIRATION RATE: 16 BRPM | TEMPERATURE: 99.1 F | WEIGHT: 210 LBS | BODY MASS INDEX: 38.41 KG/M2 | HEART RATE: 80 BPM | OXYGEN SATURATION: 95 %

## 2018-01-01 DIAGNOSIS — K81.1 CHRONIC CHOLECYSTITIS: ICD-10-CM

## 2018-01-01 DIAGNOSIS — E11.65 TYPE 2 DIABETES MELLITUS WITH HYPERGLYCEMIA, WITHOUT LONG-TERM CURRENT USE OF INSULIN (H): ICD-10-CM

## 2018-01-01 DIAGNOSIS — F43.22 ADJUSTMENT DISORDER WITH ANXIOUS MOOD: ICD-10-CM

## 2018-01-01 DIAGNOSIS — B37.2 CANDIDIASIS OF SKIN: ICD-10-CM

## 2018-01-01 DIAGNOSIS — J44.9 CHRONIC OBSTRUCTIVE PULMONARY DISEASE, UNSPECIFIED COPD TYPE (H): ICD-10-CM

## 2018-01-01 DIAGNOSIS — I10 ESSENTIAL HYPERTENSION WITH GOAL BLOOD PRESSURE LESS THAN 140/90: ICD-10-CM

## 2018-01-01 DIAGNOSIS — F33.9 RECURRENT MAJOR DEPRESSIVE DISORDER, REMISSION STATUS UNSPECIFIED (H): Primary | ICD-10-CM

## 2018-01-01 DIAGNOSIS — N17.9 ACUTE RENAL FAILURE, UNSPECIFIED ACUTE RENAL FAILURE TYPE (H): ICD-10-CM

## 2018-01-01 DIAGNOSIS — J44.1 OBSTRUCTIVE CHRONIC BRONCHITIS WITH ACUTE EXACERBATION (H): ICD-10-CM

## 2018-01-01 DIAGNOSIS — B37.31 YEAST INFECTION OF THE VAGINA: ICD-10-CM

## 2018-01-01 DIAGNOSIS — J44.1 CHRONIC OBSTRUCTIVE PULMONARY DISEASE WITH ACUTE EXACERBATION (H): Primary | ICD-10-CM

## 2018-01-01 DIAGNOSIS — I25.10 CORONARY ARTERY DISEASE INVOLVING NATIVE CORONARY ARTERY OF NATIVE HEART, ANGINA PRESENCE UNSPECIFIED: ICD-10-CM

## 2018-01-01 DIAGNOSIS — K81.1 CHRONIC CHOLECYSTITIS: Primary | ICD-10-CM

## 2018-01-01 DIAGNOSIS — Z23 NEED FOR PROPHYLACTIC VACCINATION AND INOCULATION AGAINST INFLUENZA: ICD-10-CM

## 2018-01-01 DIAGNOSIS — H61.21 IMPACTED CERUMEN OF RIGHT EAR: ICD-10-CM

## 2018-01-01 DIAGNOSIS — G47.00 INSOMNIA, UNSPECIFIED TYPE: ICD-10-CM

## 2018-01-01 DIAGNOSIS — J44.1 OBSTRUCTIVE CHRONIC BRONCHITIS WITH ACUTE EXACERBATION (H): Primary | ICD-10-CM

## 2018-01-01 DIAGNOSIS — F41.9 ANXIETY: ICD-10-CM

## 2018-01-01 DIAGNOSIS — J44.1 COPD EXACERBATION (H): Primary | ICD-10-CM

## 2018-01-01 LAB
ANION GAP SERPL CALCULATED.3IONS-SCNC: 4 MMOL/L (ref 3–14)
BUN SERPL-MCNC: 44 MG/DL (ref 7–30)
CALCIUM SERPL-MCNC: 8.4 MG/DL (ref 8.5–10.1)
CHLORIDE SERPL-SCNC: 98 MMOL/L (ref 94–109)
CO2 SERPL-SCNC: 37 MMOL/L (ref 20–32)
CREAT SERPL-MCNC: 1.68 MG/DL (ref 0.52–1.04)
CREAT SERPL-MCNC: 1.75 MG/DL (ref 0.57–1.11)
GFR SERPL CREATININE-BSD FRML MDRD: 30 ML/MIN/1.73M2
GFR SERPL CREATININE-BSD FRML MDRD: 34 ML/MIN/{1.73_M2}
GLUCOSE SERPL-MCNC: 163 MG/DL (ref 65–100)
GLUCOSE SERPL-MCNC: 280 MG/DL (ref 70–99)
POTASSIUM SERPL-SCNC: 4.3 MMOL/L (ref 3.4–5.3)
POTASSIUM SERPL-SCNC: 5 MMOL/L (ref 3.5–5)
SODIUM SERPL-SCNC: 139 MMOL/L (ref 133–144)

## 2018-01-01 PROCEDURE — 80048 BASIC METABOLIC PNL TOTAL CA: CPT | Performed by: INTERNAL MEDICINE

## 2018-01-01 PROCEDURE — 71046 X-RAY EXAM CHEST 2 VIEWS: CPT | Mod: TC

## 2018-01-01 PROCEDURE — 99214 OFFICE O/P EST MOD 30 MIN: CPT | Mod: 25 | Performed by: INTERNAL MEDICINE

## 2018-01-01 PROCEDURE — 99214 OFFICE O/P EST MOD 30 MIN: CPT | Performed by: INTERNAL MEDICINE

## 2018-01-01 PROCEDURE — 36415 COLL VENOUS BLD VENIPUNCTURE: CPT | Performed by: INTERNAL MEDICINE

## 2018-01-01 PROCEDURE — 90471 IMMUNIZATION ADMIN: CPT | Performed by: INTERNAL MEDICINE

## 2018-01-01 PROCEDURE — 69209 REMOVE IMPACTED EAR WAX UNI: CPT | Mod: RT | Performed by: INTERNAL MEDICINE

## 2018-01-01 PROCEDURE — 99495 TRANSJ CARE MGMT MOD F2F 14D: CPT | Performed by: INTERNAL MEDICINE

## 2018-01-01 PROCEDURE — 90686 IIV4 VACC NO PRSV 0.5 ML IM: CPT | Performed by: INTERNAL MEDICINE

## 2018-01-01 RX ORDER — HYDROXYZINE PAMOATE 25 MG/1
25-50 CAPSULE ORAL
COMMUNITY
Start: 2018-01-01 | End: 2019-01-01

## 2018-01-01 RX ORDER — PREDNISONE 20 MG/1
40 TABLET ORAL DAILY
Qty: 10 TABLET | Refills: 0 | Status: SHIPPED | OUTPATIENT
Start: 2018-01-01 | End: 2019-01-01

## 2018-01-01 RX ORDER — HYDROCODONE BITARTRATE AND ACETAMINOPHEN 5; 325 MG/1; MG/1
1 TABLET ORAL EVERY 4 HOURS PRN
Qty: 30 TABLET | Refills: 0 | Status: SHIPPED | OUTPATIENT
Start: 2018-01-01 | End: 2018-01-01

## 2018-01-01 RX ORDER — PREDNISONE 20 MG/1
20 TABLET ORAL DAILY
Qty: 10 TABLET | Refills: 0 | Status: SHIPPED | OUTPATIENT
Start: 2018-01-01 | End: 2018-01-01

## 2018-01-01 RX ORDER — PREDNISONE 20 MG/1
20 TABLET ORAL DAILY
Qty: 7 TABLET | Refills: 0 | Status: SHIPPED | OUTPATIENT
Start: 2018-01-01 | End: 2019-01-01

## 2018-01-01 RX ORDER — AZITHROMYCIN 250 MG/1
TABLET, FILM COATED ORAL
Qty: 6 TABLET | Refills: 0 | Status: SHIPPED | OUTPATIENT
Start: 2018-01-01 | End: 2018-01-01

## 2018-01-01 RX ORDER — FLUCONAZOLE 150 MG/1
150 TABLET ORAL ONCE
Qty: 1 TABLET | Refills: 0 | Status: SHIPPED | OUTPATIENT
Start: 2018-01-01 | End: 2019-01-01

## 2018-01-01 RX ORDER — CETIRIZINE HYDROCHLORIDE 10 MG/1
10 TABLET ORAL
COMMUNITY
Start: 2018-01-01

## 2018-01-01 RX ORDER — CEFUROXIME AXETIL 250 MG/1
250 TABLET ORAL 2 TIMES DAILY
Qty: 20 TABLET | Refills: 0 | Status: SHIPPED | OUTPATIENT
Start: 2018-01-01 | End: 2019-01-01

## 2018-01-01 RX ORDER — FUROSEMIDE 40 MG
TABLET ORAL
Qty: 30 TABLET | Refills: 1 | Status: SHIPPED | OUTPATIENT
Start: 2018-01-01 | End: 2019-01-01

## 2018-01-01 RX ORDER — HYDROCODONE BITARTRATE AND ACETAMINOPHEN 5; 325 MG/1; MG/1
1 TABLET ORAL EVERY 4 HOURS PRN
Qty: 30 TABLET | Refills: 0 | Status: CANCELLED | OUTPATIENT
Start: 2018-01-01

## 2018-01-01 RX ORDER — ALPRAZOLAM 0.5 MG
0.5 TABLET ORAL 3 TIMES DAILY PRN
Qty: 30 TABLET | Refills: 0 | Status: SHIPPED | OUTPATIENT
Start: 2018-01-01 | End: 2019-01-01

## 2018-01-01 RX ORDER — INSULIN GLARGINE 100 [IU]/ML
15 INJECTION, SOLUTION SUBCUTANEOUS DAILY
Qty: 15 ML | Refills: 1 | Status: SHIPPED | OUTPATIENT
Start: 2018-01-01 | End: 2019-01-01

## 2018-01-01 RX ORDER — HYDROCODONE BITARTRATE AND ACETAMINOPHEN 5; 325 MG/1; MG/1
1 TABLET ORAL EVERY 4 HOURS PRN
Qty: 30 TABLET | Refills: 0 | Status: SHIPPED | OUTPATIENT
Start: 2018-01-01 | End: 2019-01-01

## 2018-01-01 RX ORDER — TRAZODONE HYDROCHLORIDE 50 MG/1
50-100 TABLET, FILM COATED ORAL
Qty: 90 TABLET | Refills: 0 | Status: SHIPPED | OUTPATIENT
Start: 2018-01-01

## 2018-01-01 RX ORDER — PREDNISONE 10 MG/1
TABLET ORAL
COMMUNITY
Start: 2018-01-01 | End: 2019-01-01

## 2018-01-01 RX ORDER — NYSTATIN 100000 [USP'U]/G
POWDER TOPICAL
Qty: 60 G | Refills: 1 | Status: SHIPPED | OUTPATIENT
Start: 2018-01-01

## 2018-01-01 SDOH — HEALTH STABILITY: MENTAL HEALTH: HOW OFTEN DO YOU HAVE A DRINK CONTAINING ALCOHOL?: NEVER

## 2018-01-01 ASSESSMENT — PAIN SCALES - GENERAL
PAINLEVEL: SEVERE PAIN (6)

## 2018-01-01 ASSESSMENT — PATIENT HEALTH QUESTIONNAIRE - PHQ9: SUM OF ALL RESPONSES TO PHQ QUESTIONS 1-9: 15

## 2018-01-01 ASSESSMENT — MIFFLIN-ST. JEOR: SCORE: 1500.82

## 2018-01-10 NOTE — TELEPHONE ENCOUNTER
Per outreach, patient is aware of overdue screening and will call on their own time for scheduling.     Thanks      Outreach ,  Michelle Lino

## 2018-01-24 ENCOUNTER — TELEPHONE (OUTPATIENT)
Dept: INTERNAL MEDICINE | Facility: CLINIC | Age: 53
End: 2018-01-24

## 2018-01-24 NOTE — TELEPHONE ENCOUNTER
Panel Management Review      Patient has the following on her problem list: None      Composite cancer screening  Chart review shows that this patient is due/due soon for the following Mammogram  Summary:    Patient is due/failing the following:   MAMMOGRAM    Action needed:   Scheduling Mammogram    Type of outreach:    Phone, spoke to patient.  pt reports she cannot afford to have the Mammorgram done at this time    Questions for provider review:    None                                                                                                                                    Danitza Crane CMA

## 2018-02-08 ENCOUNTER — TRANSFERRED RECORDS (OUTPATIENT)
Dept: HEALTH INFORMATION MANAGEMENT | Facility: CLINIC | Age: 53
End: 2018-02-08

## 2018-02-11 ENCOUNTER — TRANSFERRED RECORDS (OUTPATIENT)
Dept: HEALTH INFORMATION MANAGEMENT | Facility: CLINIC | Age: 53
End: 2018-02-11

## 2018-02-12 ENCOUNTER — TRANSFERRED RECORDS (OUTPATIENT)
Dept: HEALTH INFORMATION MANAGEMENT | Facility: CLINIC | Age: 53
End: 2018-02-12

## 2018-02-12 LAB — EJECTION FRACTION: 63

## 2018-02-13 ENCOUNTER — TELEPHONE (OUTPATIENT)
Dept: CARE COORDINATION | Facility: CLINIC | Age: 53
End: 2018-02-13

## 2018-02-13 ENCOUNTER — TRANSFERRED RECORDS (OUTPATIENT)
Dept: HEALTH INFORMATION MANAGEMENT | Facility: CLINIC | Age: 53
End: 2018-02-13

## 2018-02-13 DIAGNOSIS — J44.1 COPD EXACERBATION (H): Primary | ICD-10-CM

## 2018-02-13 NOTE — TELEPHONE ENCOUNTER
DC'd from [facility] on 2/10/18 to Home self care   Primary Problem: COPD exacerbation  LACE: 72 High

## 2018-02-14 ENCOUNTER — CARE COORDINATION (OUTPATIENT)
Dept: CARE COORDINATION | Facility: CLINIC | Age: 53
End: 2018-02-14

## 2018-02-14 NOTE — TELEPHONE ENCOUNTER
See 2/14/18 care coordination encounter.    Melissa Behl BSN, RN, PHN  [facility] Care Coordinator  [phone]

## 2018-02-14 NOTE — PROGRESS NOTES
Clinic Care Coordination Contact    Situation: Patient chart reviewed by care coordinator.    Background: RN CC received [facility] Discharge report, patient was discharged from [facility] 2/11/18, diagnosis COPD exacerbation    Assessment: Patient was readmitted to [facility] 2/11/18 for drug eruption, chest pain, hypertensive urgency and acute kidney injury    Plan/Recommendations: RN CC will monitor for discharge from [facility].    Melissa Behl BSN, RN, PHN  [facility] Care Coordinator  [phone]

## 2018-02-15 LAB — POTASSIUM SERPL-SCNC: 4.4 MMOL/L (ref 3.5–5)

## 2018-02-16 NOTE — PROGRESS NOTES
Clinic Care Coordination Contact    Situation: Patient chart reviewed by care coordinator.    Background: RN CC monitoring for discharge from [facility].    Assessment: Per Care Everywhere, patient remains inpatient.    Plan/Recommendations: RN CC will continue to monitor for hospital discharge.    Melissa Behl BSN, RN, N  [facility] Care Coordinator  [phone]

## 2018-02-19 ENCOUNTER — TELEPHONE (OUTPATIENT)
Dept: INTERNAL MEDICINE | Facility: CLINIC | Age: 53
End: 2018-02-19

## 2018-02-19 ENCOUNTER — CARE COORDINATION (OUTPATIENT)
Dept: CARE COORDINATION | Facility: CLINIC | Age: 53
End: 2018-02-19

## 2018-02-19 NOTE — LETTER
[place] CARE COORDINATION  [address]   [address]  [phone]     February 19, 2018    Bernard Hughes  [facility] [address] [place] [address]      Dear Bernard,    I am a clinic care coordinator who works with Dorian Cade MD at [facility]. I wanted to thank you for spending the time to talk with me.  I wanted to introduce myself and provide you with my contact information so that you can call me with questions or concerns about your health care. Below is a description of clinic care coordination and how I can further assist you.     The clinic care coordinator is a registered nurse and/or  who understand the health care system. The goal of clinic care coordination is to help you manage your health and improve access to the [facility] system in the most efficient manner. The registered nurse can assist you in meeting your health care goals by providing education, coordinating services, and strengthening the communication among your providers. The  can assist you with financial, behavioral, psychosocial, chemical dependency, counseling, and/or psychiatric resources.    Please feel free to contact me at [phone], with any questions or concerns. We at [facility] are focused on providing you with the highest-quality healthcare experience possible and that all starts with you.     Sincerely,     Melissa Behl BSN, RN, PHN  [facility] Care Coordinator  [phone]       Enclosed: I have enclosed a copy of a 24 Hour Access Plan. This has helpful phone numbers for you to call when needed. Please keep this in an easy to access place to use as needed.

## 2018-02-19 NOTE — LETTER
Health Care Home - Access Care Plan    About Me  Patient Name:  Bernard Vera    YOB: 1965  Age:                             52 year old   [place] MRN:            [id number] Telephone Information:     Home Phone [phone]   Mobile [phone]       Address:    [address] [place] [address] Email address:  No e-mail address on record      Emergency Contact(s)  Name Relationship Lgl Grd Work Phone Home Phone Mobile Phone   1. MARTIN VERA Spouse   [phone]   2. MOOKIE KINCAID Daughter   [phone]   3. CARY KINCAID Son   [phone]             Health Maintenance: Routine Health maintenance Reviewed: Due/Overdue     My Access Plan  Medical Emergency 911   Questions or concerns during clinic hours Primary Clinic Line, I will call the clinic directly: Primary Clinic: [facility] [phone]   24 Hour Appointment Line [phone] or  [phone]) (toll free)   24 Hour Nurse Line [phone] (toll free)   Questions or concerns outside clinic hours 24 Hour Appointment Line, I will call the after-hours on-call line:   [facility] [phone] or [phone]) (toll-free)   Preferred Urgent Care Preferred Urgent Care: Other   Preferred Hospital Preferred Hospital: [facility]  [phone]   Preferred Pharmacy [facility] [place] [facility] [address] [address]      Behavioral Health Crisis Line The National Suicide Prevention Lifeline at 1-144.532.7837 or 911     My Care Team Members  Patient Care Team       Relationship Specialty Notifications Start End    Dorian Cade MD PCP - General Internal Medicine  5/18/16     Phone: [phone] Fax: [phone]         [facility] [address] [place] [address]    Behl, Melissa K, RN Clinic Care Coordinator Nurse Admissions 2/19/18     Comment:  Phone: [phone]        My Medical and  Care Information  Problem List   Patient Active Problem List   Diagnosis     COPD (chronic obstructive pulmonary disease) (H)     Essential hypertension with goal blood pressure less than 140/90     Type 2 diabetes mellitus with hyperglycemia, without long-term current use of insulin (H)     Coronary artery disease involving native coronary artery of native heart, angina presence unspecified     Hyperlipidemia LDL goal <100     Recurrent major depressive disorder, remission status unspecified (H)      Current Medications and Allergies:  See printed Medication Report

## 2018-02-19 NOTE — TELEPHONE ENCOUNTER
Reason for Call: Request for an order or referral:    Order or referral being requested: needs Verbal Order to start HomeCare on 02/21    Date needed: as soon as possible    Has the patient been seen by the PCP for this problem? YES    Additional comments: Pt wanted to start on Wednesday not today    Phone number Patient can be reached at:  [phone]    Best Time:  any    Can we leave a detailed message on this number?  YES    Call taken on 2/19/2018 at 3:27 PM by Annamarie Tan

## 2018-02-19 NOTE — TELEPHONE ENCOUNTER
Patient called to schedule appointment for a hospital follow-up    Patient was admitted to :  [facility]    Discharged date: 2/17/2018    Reason for hospital admission: 2nd time chest pain & allergic reaction.  First time COPD & BP.  Patient was in the hospital for a total of 12 days.  She was on her way home from the first stay when she was experiencing chest pain and was brought back to the hospital by ambulance and they found she was having an allergic reaction.    Does patient have future appointment scheduled with provider? No    Date of future appointment:  Asking for work in appointment this week 2/19-2/23/18, with Dr Cade.    Message sent.    This information will be used to help the care team plan for the patients upcoming visit.  The triage RN may determine that a follow up call is necessary and reach out to the patient via the phone number listed in the chart.     Please route this message on routine priority to the Triage RN pool.

## 2018-02-19 NOTE — PROGRESS NOTES
Clinic Care Coordination Contact  Care Coordination Communication    Referral Source: [facility]    Clinical Data: Patient was hospitalized at [facility] from 2/11/18 to 2/17/18 with diagnosis of drug eruption, chest pain, hypertensive urgency, acute kidney injury.     Home Care Contact:              Home Care Agency: [facility]              Contact name () and phone number: Jael Sesay RN Case Manager [phone]              Care Coordination contacted home care: Yes              Anticipated start of care date: 2/21/18    Patient Contact:               Introduced self and role of care coordination.               Discharge instructions were reviewed with patient/caregiver.               Do you have any questions about your medications? No, daughter sets up medications for patient              Follow up appointment is scheduled for 2/20/18.              Provided 24 Hour Nurse Line and/or 24 Hour Appointment Scheduling: Yes              Home care has contacted patient: Yes              Patient questions/concerns: None at this time    Plan: RN/SW Care Coordinator will await notification from home care staff informing RN/SW Care Coordinator of patients discharge plans/needs. RN/SW Care Coordinator will review chart and outreach to home care every 4 weeks and as needed.      Melissa Behl BSN, RN, PHN  [facility] Care Coordinator  [phone]

## 2018-02-19 NOTE — TELEPHONE ENCOUNTER
Reason for Call:  Same Day Appointment, Requested Provider:  Dorian Cade MD    PCP: Dorian Cade    Reason for visit: hospital follow up discharge 2/17 - chest pain & allergic reaction, COPD & BP    Duration of symptoms: was in for 12 days    Have you been treated for this in the past? Yes    Additional comments: was in [facility], they told her they would be sending all the documentation to Dr Cade and she has all the discharge paperwork as well  Needs to be seen this week 2/19-2/23/18 by PCP.  Aware Dr Cade not in today 2/19/18    Can we leave a detailed message on this number? YES    Phone number patient can be reached at: Home number on file [phone] (home)    Best Time: needs to be seen this week    Call taken on 2/19/2018 at 7:09 AM by Tiffany Romero

## 2018-02-20 ENCOUNTER — OFFICE VISIT (OUTPATIENT)
Dept: INTERNAL MEDICINE | Facility: CLINIC | Age: 53
End: 2018-02-20
Payer: COMMERCIAL

## 2018-02-20 ENCOUNTER — TELEPHONE (OUTPATIENT)
Dept: FAMILY MEDICINE | Facility: CLINIC | Age: 53
End: 2018-02-20

## 2018-02-20 VITALS
WEIGHT: 197 LBS | TEMPERATURE: 98.2 F | OXYGEN SATURATION: 93 % | BODY MASS INDEX: 36.25 KG/M2 | HEART RATE: 64 BPM | HEIGHT: 62 IN | DIASTOLIC BLOOD PRESSURE: 76 MMHG | RESPIRATION RATE: 16 BRPM | SYSTOLIC BLOOD PRESSURE: 144 MMHG

## 2018-02-20 DIAGNOSIS — E11.65 TYPE 2 DIABETES MELLITUS WITH HYPERGLYCEMIA, WITHOUT LONG-TERM CURRENT USE OF INSULIN (H): ICD-10-CM

## 2018-02-20 DIAGNOSIS — E83.42 HYPOMAGNESEMIA: ICD-10-CM

## 2018-02-20 DIAGNOSIS — I25.10 CORONARY ARTERY DISEASE INVOLVING NATIVE CORONARY ARTERY OF NATIVE HEART, ANGINA PRESENCE UNSPECIFIED: Primary | ICD-10-CM

## 2018-02-20 DIAGNOSIS — F33.9 RECURRENT MAJOR DEPRESSIVE DISORDER, REMISSION STATUS UNSPECIFIED (H): ICD-10-CM

## 2018-02-20 DIAGNOSIS — E87.6 HYPOKALEMIA: ICD-10-CM

## 2018-02-20 DIAGNOSIS — I10 ESSENTIAL HYPERTENSION WITH GOAL BLOOD PRESSURE LESS THAN 140/90: ICD-10-CM

## 2018-02-20 DIAGNOSIS — J44.0 CHRONIC OBSTRUCTIVE PULMONARY DISEASE WITH ACUTE LOWER RESPIRATORY INFECTION (H): ICD-10-CM

## 2018-02-20 LAB
ALBUMIN SERPL-MCNC: 2.7 G/DL (ref 3.4–5)
ALP SERPL-CCNC: 103 U/L (ref 40–150)
ALT SERPL W P-5'-P-CCNC: 34 U/L (ref 0–50)
ANION GAP SERPL CALCULATED.3IONS-SCNC: 7 MMOL/L (ref 3–14)
AST SERPL W P-5'-P-CCNC: 13 U/L (ref 0–45)
BILIRUB SERPL-MCNC: 0.3 MG/DL (ref 0.2–1.3)
BUN SERPL-MCNC: 27 MG/DL (ref 7–30)
CALCIUM SERPL-MCNC: 9.1 MG/DL (ref 8.5–10.1)
CHLORIDE SERPL-SCNC: 95 MMOL/L (ref 94–109)
CO2 SERPL-SCNC: 32 MMOL/L (ref 20–32)
CREAT SERPL-MCNC: 1.05 MG/DL (ref 0.52–1.04)
GFR SERPL CREATININE-BSD FRML MDRD: 55 ML/MIN/1.7M2
GLUCOSE SERPL-MCNC: 351 MG/DL (ref 70–99)
HBA1C MFR BLD: 7.6 % (ref 4.3–6)
MAGNESIUM SERPL-MCNC: 1.6 MG/DL (ref 1.6–2.3)
POTASSIUM SERPL-SCNC: 4.8 MMOL/L (ref 3.4–5.3)
PROT SERPL-MCNC: 6.1 G/DL (ref 6.8–8.8)
SODIUM SERPL-SCNC: 134 MMOL/L (ref 133–144)

## 2018-02-20 PROCEDURE — 99495 TRANSJ CARE MGMT MOD F2F 14D: CPT | Performed by: INTERNAL MEDICINE

## 2018-02-20 PROCEDURE — 83735 ASSAY OF MAGNESIUM: CPT | Performed by: INTERNAL MEDICINE

## 2018-02-20 PROCEDURE — 80053 COMPREHEN METABOLIC PANEL: CPT | Performed by: INTERNAL MEDICINE

## 2018-02-20 PROCEDURE — 36415 COLL VENOUS BLD VENIPUNCTURE: CPT | Performed by: INTERNAL MEDICINE

## 2018-02-20 PROCEDURE — 83036 HEMOGLOBIN GLYCOSYLATED A1C: CPT | Performed by: INTERNAL MEDICINE

## 2018-02-20 RX ORDER — DOXYCYCLINE 100 MG/1
100 CAPSULE ORAL 2 TIMES DAILY
COMMUNITY
End: 2018-03-20

## 2018-02-20 RX ORDER — ISOSORBIDE MONONITRATE 60 MG/1
60 TABLET, EXTENDED RELEASE ORAL DAILY
COMMUNITY
End: 2018-03-20

## 2018-02-20 RX ORDER — NITROGLYCERIN 0.4 MG/1
TABLET SUBLINGUAL
Qty: 25 TABLET | Refills: 3 | Status: SHIPPED | OUTPATIENT
Start: 2018-02-20

## 2018-02-20 RX ORDER — METOPROLOL TARTRATE 100 MG
100 TABLET ORAL 2 TIMES DAILY
COMMUNITY
End: 2018-03-20

## 2018-02-20 RX ORDER — AMLODIPINE BESYLATE 5 MG/1
2.5 TABLET ORAL DAILY
COMMUNITY
End: 2018-04-16

## 2018-02-20 ASSESSMENT — PAIN SCALES - GENERAL: PAINLEVEL: SEVERE PAIN (6)

## 2018-02-20 NOTE — LETTER
[address]   [address]  Tel. [phone] / Fax [phone]    February 21, 2018        Bernard Hughes  [address] [facility] [address]          Dear Bernard,    Her labs are ok, magnesium, potassium are good.  Kidneys are ok. Diabetes is up some sugar was 351 from steroids.  Hgba1c was up to 7.6.  Continue her medications     Sincerely,        Dorian Cade MD

## 2018-02-20 NOTE — MR AVS SNAPSHOT
"              After Visit Summary   2/20/2018    Bernard Hughes    MRN: [id number]           Patient Information     Date Of Birth          1965        Visit Information        Provider Department      2/20/2018 4:00 PM Dorian Cade MD [facility]         Follow-ups after your visit        Your next 10 appointments already scheduled     Feb 23, 2018  1:00 PM CST   TELEMEDICINE with Ezekiel Desai [facility] ([facility])    [address] [place] [id number] [phone]           Note: this is not an onsite visit; there is no need to come to the facility.              Who to contact     If you have questions or need follow up information about today's clinic visit or your schedule please contact [facility] directly at [phone].  Normal or non-critical lab and imaging results will be communicated to you by MyChart, letter or phone within 4 business days after the clinic has received the results. If you do not hear from us within 7 days, please contact the clinic through MyChart or phone. If you have a critical or abnormal lab result, we will notify you by phone as soon as possible.  Submit refill requests through [employer] or call your pharmacy and they will forward the refill request to us. Please allow 3 business days for your refill to be completed.          Additional Information About Your Visit        MyChart Information     My [employer] lets you send messages to your doctor, view your test results, renew your prescriptions, schedule appointments and more. To sign up, go to [email or web address] . Click on \"Log in\" on the left side of the screen, which will take you to the Welcome page. Then click on \"Sign up Now\" on the right side of the page.     You will be asked to enter the access code listed below, as well as some personal information. Please follow the directions to create your username and password.   " "  Your access code is: [email or web address]  Expires: 2018  4:22 PM     Your access code will  in 90 days. If you need help or a new code, please call your [facility] clinic or [phone].        Care EveryWhere ID     This is your Care EveryWhere ID. This could be used by other organizations to access your [facility] medical records  [id number]        Your Vitals Were     Pulse Temperature Respirations Height Pulse Oximetry BMI (Body Mass Index)    64 98.2  F (36.8  C) (Temporal) 16 5' 2\" (1.575 m) 93% 36.03 kg/m2       Blood Pressure from Last 3 Encounters:   18 144/76   17 152/72   17 130/68    Weight from Last 3 Encounters:   18 197 lb (89.4 kg)   17 186 lb (84.4 kg)   17 174 lb (78.9 kg)              Today, you had the following     No orders found for display         Today's Medication Changes          These changes are accurate as of 18  4:22 PM.  If you have any questions, ask your nurse or doctor.               These medicines have changed or have updated prescriptions.        Dose/Directions    metoprolol tartrate 100 MG tablet   Commonly known as:  LOPRESSOR   This may have changed:  Another medication with the same name was removed. Continue taking this medication, and follow the directions you see here.   Changed by:  Dorian Cade MD        Dose:  100 mg   Take 100 mg by mouth 2 times daily   Refills:  0                Primary Care Provider Office Phone # Fax #    Dorian Cade [phone]       [facility] [address] [place] [address]        Equal Access to Services     [facility] Campus[facility] : Hadsalud Doe, waaxda luiwona, qaybta kaalhermes krueger. So [facility] [phone].    ATENCIÓN: Si habla español, tiene a hitchcock disposición servicios gratuitos de asistencia lingüística. Llame al [phone].    We comply with applicable federal civil rights laws and [place] laws. " We do not discriminate on the basis of race, color, national origin, age, disability, sex, sexual orientation, or gender identity.            Thank you!     Thank you for choosing [facility]  for your care. Our goal is always to provide you with excellent care. Hearing back from our patients is one way we can continue to improve our services. Please take a few minutes to complete the written survey that you may receive in the mail after your visit with us. Thank you!             Your Updated Medication List - Protect others around you: Learn how to safely use, store and throw away your medicines at www.disposemymeds.org.          This list is accurate as of 2/20/18  4:22 PM.  Always use your most recent med list.                   Brand Name Dispense Instructions for use Diagnosis    ALPRAZolam 0.5 MG tablet    XANAX    30 tablet    Take 1 tablet (0.5 mg) by mouth 3 times daily as needed for anxiety (To use no more then twice a week.)    Adjustment disorder with anxious mood, Chronic obstructive pulmonary disease, unspecified COPD type (H)       amLODIPine 5 MG tablet    NORVASC     Take 2.5 mg by mouth daily        aspirin 81 MG EC tablet     90 tablet    Take 1 tablet (81 mg) by mouth daily    Type 2 diabetes, HbA1C goal < 8% (H), Hypertension goal BP (blood pressure) < 140/90       atorvastatin 80 MG tablet    LIPITOR     Take 80 mg by mouth daily        doxycycline monohydrate 100 MG capsule      Take 100 mg by mouth 2 times daily        FLUoxetine 40 MG capsule    PROzac    90 capsule    Take 1 capsule (40 mg) by mouth daily    Adjustment disorder with depressed mood       fluticasone-salmeterol 100-50 MCG/DOSE diskus inhaler    ADVAIR    1 Inhaler    Inhale 1 puff into the lungs 2 times daily as needed        furosemide 20 MG tablet    LASIX    90 tablet    Take 1 tablet (20 mg) by mouth daily    Chronic congestive heart failure, unspecified congestive heart failure type (H)        "glyBURIDE-metFORMIN 5-500 MG per tablet    GLUCOVANCE    360 tablet    2 tablets in the AM and 2 at PM.    Type 2 diabetes mellitus with hyperglycemia, without long-term current use of insulin (H)       INCRUSE ELLIPTA 62.5 MCG/INH oral inhaler   Generic drug:  umeclidinium      Inhale 1 puff into the lungs daily        ipratropium - albuterol 0.5 mg/2.5 mg/3 mL 0.5-2.5 (3) MG/3ML neb solution    DUONEB    360 mL    NEBULIZE CONTENTS OF ONE VIAL EVERY 4 HOURS AS NEEDED FOR SHORTNESS OF BREATH / DYSPNEA OR WHEEZING    Chronic obstructive pulmonary disease with acute exacerbation (H), SOB (shortness of breath)       isosorbide mononitrate 60 MG 24 hr tablet    IMDUR     Take 60 mg by mouth daily        lisinopril 20 MG tablet    PRINIVIL/ZESTRIL    180 tablet    Take 2 tablets (40 mg) by mouth daily    Essential hypertension with goal blood pressure less than 140/90       metoprolol tartrate 100 MG tablet    LOPRESSOR     Take 100 mg by mouth 2 times daily        NITROGLYCERIN SL      Place 0.4 mg under the tongue every 5 minutes as needed for chest pain        nystatin 081936 UNIT/GM Powd    MYCOSTATIN    30 g    Apply topically 3 times daily as needed    Candidiasis of skin       order for DME     1 Device    2 wheel walker with rear ski glides Height:  5'4\" Weight:  173lbs    Chronic obstructive pulmonary disease, unspecified COPD type (H), Difficulty walking       PLAVIX PO      Take 75 mg by mouth daily        PREDNISONE PO      Sliding scale        tiotropium 18 MCG capsule    SPIRIVA HANDIHALER    90 capsule    USING THE HANDIHALER, INHALE THE CONTENTS OF ONE CAPSULE DAILY    Chronic obstructive pulmonary disease, unspecified COPD type (H)       VENTOLIN  (90 BASE) MCG/ACT Inhaler   Generic drug:  albuterol     18 g    INHALE 2 PUFFS INTO THE LUNGS EVERY 4 HOURS AS NEEDED FOR SHORTNESS OF BREATH, DIFFICULTY BREATHING OR WHEEZING.    Chronic obstructive pulmonary disease, unspecified COPD type (H)       "

## 2018-02-20 NOTE — NURSING NOTE
"Chief Complaint   Patient presents with     Hospital F/U       Initial /76  Pulse 64  Temp 98.2  F (36.8  C) (Temporal)  Resp 16  Ht 5' 2\" (1.575 m)  Wt 197 lb (89.4 kg)  SpO2 93%  BMI 36.03 kg/m2 Estimated body mass index is 36.03 kg/(m^2) as calculated from the following:    Height as of this encounter: 5' 2\" (1.575 m).    Weight as of this encounter: 197 lb (89.4 kg).  Medication Reconciliation: complete    "

## 2018-02-20 NOTE — PROGRESS NOTES
SUBJECTIVE:   Bernard Hughes is a 52 year old female who presents to clinic today for the following health issues:          Hospital Follow-up Visit:    Hospital/Nursing Home/IP Rehab Facility: [facility]  Date of Admission: 2/11/18  Date of Discharge: 2/17/18  Reason(s) for Admission: Drug reaction  Chest pain            Problems taking medications regularly:  None       Medication changes since discharge: None       Problems adhering to non-medication therapy:  None    Summary of hospitalization:  See outside records, reviewed and scanned  Diagnostic Tests/Treatments reviewed.  Follow up needed: none  Other Healthcare Providers Involved in Patient s Care:         Homecare  Update since discharge: improved.     Post Discharge Medication Reconciliation: discharge medications reconciled and changed, per note/orders (see AVS).  Plan of care communicated with patient and family        She was in the hospital and had levaquin for her copd and then had a stress test that was supposed ok.    Low magnesium and potassium    Mood is down and sad, crying easily.      No chest pains at home, she can feel a fluttering sensation at times.      Homecare will start up again, will have nursing, PT and OT for strengthening and ADLs.  Reason is COPD and ascvd.  She is homebound except for clinic.    Past Medical History:   Diagnosis Date     ASCVD (arteriosclerotic cardiovascular disease)     3 vessel bypass 3/2017     COPD (chronic obstructive pulmonary disease) (H)      Diabetes (H)      Hypertension      Kidney disease      Current Outpatient Prescriptions   Medication     amLODIPine (NORVASC) 5 MG tablet     doxycycline monohydrate 100 MG capsule     isosorbide mononitrate (IMDUR) 60 MG 24 hr tablet     umeclidinium (INCRUSE ELLIPTA) 62.5 MCG/INH oral inhaler     metoprolol tartrate (LOPRESSOR) 100 MG tablet     PREDNISONE PO     nitroGLYcerin (NITROSTAT) 0.4 MG sublingual tablet     atorvastatin (LIPITOR) 80 MG tablet      "ALPRAZolam (XANAX) 0.5 MG tablet     fluticasone-salmeterol (ADVAIR) 100-50 MCG/DOSE diskus inhaler     glyBURIDE-metFORMIN (GLUCOVANCE) 5-500 MG per tablet     VENTOLIN  (90 BASE) MCG/ACT Inhaler     nystatin (MYCOSTATIN) 441692 UNIT/GM POWD     ipratropium - albuterol 0.5 mg/2.5 mg/3 mL (DUONEB) 0.5-2.5 (3) MG/3ML neb solution     tiotropium (SPIRIVA HANDIHALER) 18 MCG capsule     FLUoxetine (PROZAC) 40 MG capsule     NITROGLYCERIN SL     Clopidogrel Bisulfate (PLAVIX PO)     aspirin 81 MG EC tablet     furosemide (LASIX) 20 MG tablet     order for DME     lisinopril (PRINIVIL/ZESTRIL) 20 MG tablet     No current facility-administered medications for this visit.      Social History   Substance Use Topics     Smoking status: Former Smoker     Smokeless tobacco: Never Used     Alcohol use No     Review of Systems  Constitutional-No fevers, chills, or weight changes..  Cardiac-No chest pain or palpitations.  Respiratory-Cough without sputum and SOB.  GI-No nausea, vomitting, diarrhea, constipation, or blood in the stool.  Musculoskeletal-No muscles aches or joint pains.    Physical Exam  /76  Pulse 64  Temp 98.2  F (36.8  C) (Temporal)  Resp 16  Ht 5' 2\" (1.575 m)  Wt 197 lb (89.4 kg)  SpO2 93%  BMI 36.03 kg/m2  General Appearance-alert, no distress  Cardiac-regular rate and rhythm  with normal S1, S2 ; no murmur, rub or gallops  Lungs-mild to moderate decreased air movement  Extremities-no peripheral edema, peripheral pulses normal    ASSESSMENT:  This is a 52-year-old woman who has severe COPD, coronary artery disease.  She was in [facility] shortness of breath, left after a COPD exacerbation was going home had arm pain, reaction and a rash.  The rash was felt to be from Levaquin.  She returned to the hospital is in for 5 days.  She had a stress test which was negative.  Her rash resolved they switched her from Levaquin to doxycycline she still on prednisone taper.  Her breathing is better " but has chronic issues, her stress test was reviewed today and appears to be improved with no signs of ischemia but old infarct, her ejection fraction was 65%.  Possibly her ischemia improved from collateral growth.  She has follow-up with her cardiology PA at [facility].    She did have some renal changes and needs to have her potassium, creatinine and magnesium recheck today she may need to get her Lasix restarted.    Patient is also homebound and needs to have home care for physical therapy to help her balance as she did have a fall, occupational therapy to help her in the home as well as well as general nursing care to help with medication management.    Feeling more depressed from being sick, will increase prozac to 60mg a day.      Electronically signed by Dorian Cade MD

## 2018-02-21 ENCOUNTER — TELEPHONE (OUTPATIENT)
Dept: INTERNAL MEDICINE | Facility: CLINIC | Age: 53
End: 2018-02-21

## 2018-02-21 NOTE — TELEPHONE ENCOUNTER
Reason for Call: Request for an order     Order or referral being requested: Florentin from [facility] calling and states they admitted patient today 2.21 and need verbal orders to evaluate and treat patient with skilled nursing, PT and OT.    Date needed: as soon as possible    Has the patient been seen by the PCP for this problem? YES    Additional comments:     Phone number Patient can be reached at:  Call Florentin at [phone]    Best Time:  any    Can we leave a detailed message on this number?  YES    Call taken on 2/21/2018 at 2:26 PM by Felisa Savage

## 2018-02-21 NOTE — TELEPHONE ENCOUNTER
Florentin from [facility] was advised of the verbal orders and verbalized understanding.

## 2018-02-23 ENCOUNTER — TELEPHONE (OUTPATIENT)
Dept: INTERNAL MEDICINE | Facility: CLINIC | Age: 53
End: 2018-02-23

## 2018-02-23 NOTE — TELEPHONE ENCOUNTER
Reason for Call: Request for an order or referral:    Order or referral being requested: verbal orders for PT 2 times a week for 2 weeks, 1 time a week for 1 week for balance gait strengthening, and home exercise program.     Date needed: as soon as possible    Has the patient been seen by the PCP for this problem? YES    Additional comments: Please call Ariadna back with verbal orders.    Phone number Patient can be reached at:  Other phone number:      Best Time:  anytime    Can we leave a detailed message on this number?  YES    Call taken on 2/23/2018 at 1:36 PM by Francy Ely

## 2018-03-02 ENCOUNTER — TELEPHONE (OUTPATIENT)
Dept: INTERNAL MEDICINE | Facility: CLINIC | Age: 53
End: 2018-03-02

## 2018-03-02 DIAGNOSIS — E11.65 TYPE 2 DIABETES MELLITUS WITH HYPERGLYCEMIA, WITHOUT LONG-TERM CURRENT USE OF INSULIN (H): Primary | ICD-10-CM

## 2018-03-02 RX ORDER — ATORVASTATIN CALCIUM 80 MG/1
80 TABLET, FILM COATED ORAL DAILY
Qty: 30 TABLET | Refills: 3 | Status: SHIPPED | OUTPATIENT
Start: 2018-03-02 | End: 2018-07-30

## 2018-03-02 NOTE — TELEPHONE ENCOUNTER
Atorvastatin 80 MG       Last Written Prescription Date:    Last Fill Quantity: ,   # refills:   Last Office Visit: 2/20/17  Future Office visit:       Routing refill request to provider for review/approval because:  Medication is reported/historical

## 2018-03-02 NOTE — TELEPHONE ENCOUNTER
Reason for Call: Request for an order or referral:    Order or referral being requested: for Occupational Therapy     Date needed: as soon as possible    Has the patient been seen by the PCP for this problem? YES    Additional comments: Nadeen calling from home care needs the order for Occupational Therapy for this patient extended as they were not able to get her seen. Please call in order     Phone number Patient can be reached at:  Other phone number:  [phone]    Best Time:  any    Can we leave a detailed message on this number?  YES    Call taken on 3/2/2018 at 1:02 PM by Karen Sheridan

## 2018-03-05 ENCOUNTER — TRANSFERRED RECORDS (OUTPATIENT)
Dept: HEALTH INFORMATION MANAGEMENT | Facility: CLINIC | Age: 53
End: 2018-03-05

## 2018-03-05 ENCOUNTER — TELEPHONE (OUTPATIENT)
Dept: INTERNAL MEDICINE | Facility: CLINIC | Age: 53
End: 2018-03-05

## 2018-03-05 LAB
CREAT SERPL-MCNC: 1.18 MG/DL (ref 0.57–1.11)
GFR SERPL CREATININE-BSD FRML MDRD: 48 ML/MIN/1.73M2
GLUCOSE SERPL-MCNC: 290 MG/DL (ref 65–100)
POTASSIUM SERPL-SCNC: 4.9 MMOL/L (ref 3.5–5)

## 2018-03-05 NOTE — TELEPHONE ENCOUNTER
Home care is at patient's house and is having patient use a nebulizer.  When home care got there, patient's O2 was at 77% on 4L of oxygen.  Now that she has taken a nebulizer, her O2 is up to 83%.  Patient is reporting to have trouble breathing.  Home care is instructed that patient needs to get to the ED.  They are instructed that it would be safest to call for an ambulance.  Patient understands and agrees to this plan.  Closing this encounter.  Josie Harrison, LEON, RN

## 2018-03-06 ENCOUNTER — MEDICAL CORRESPONDENCE (OUTPATIENT)
Dept: HEALTH INFORMATION MANAGEMENT | Facility: CLINIC | Age: 53
End: 2018-03-06

## 2018-03-06 ENCOUNTER — CARE COORDINATION (OUTPATIENT)
Dept: CARE COORDINATION | Facility: CLINIC | Age: 53
End: 2018-03-06

## 2018-03-06 NOTE — PROGRESS NOTES
Clinic Care Coordination Contact  Care Team Conversations    RN CC spoke with patient's home care RN Case Manager Jael Sesay [phone] who informed writer patient is currently inpatient at [facility].  RN CC will monitor for patient discharge.    Melissa Behl BSN, RN, N  [facility] Care Coordinator  [phone]

## 2018-03-08 NOTE — PROGRESS NOTES
Clinic Care Coordination Contact    Situation: Patient chart reviewed by care coordinator.    Background: RN CC reviewing chart for hospital discharge.    Assessment: Per Care Everywhere, patient remains inpatient.    Plan/Recommendations: RN CC will continue to monitor for hospital discharge.    Melissa Behl BSN, RN, N  [facility] Care Coordinator  [phone]

## 2018-03-09 NOTE — PROGRESS NOTES
Clinic Care Coordination Contact    Situation: Patient chart reviewed by care coordinator.    Background: RN CC reviewing chart for hospital discharge.    Assessment: Per Care Everywhere, patient remains inpatient.    Plan/Recommendations: RN CC will continue to monitor for hospital discharge.    Melissa Behl BSN, RN, N  [facility] Care Coordinator  [phone]

## 2018-03-12 NOTE — PROGRESS NOTES
Clinic Care Coordination Contact    Situation: Patient chart reviewed by care coordinator.    Background: RN CC reviewing chart for hospital discharge.    Assessment: Per Care Everywhere, patient remains inpatient with plans to discharge today or tomorrow.    Plan/Recommendations: RN CC will continue to monitor for hospital discharge.    Melissa Behl BSN, RN, N  [facility] Care Coordinator  [phone]

## 2018-03-13 NOTE — PROGRESS NOTES
Clinic Care Coordination Contact    Situation: Patient chart reviewed by care coordinator.    Background: RN CC reviewing chart for hospital discharge.    Assessment: Per Care Everywhere, patient remains inpatient.    Plan/Recommendations: RN CC will continue to monitor for discharge.       Melissa Behl BSN, RN, N  [facility] Care Coordinator  [phone]

## 2018-03-15 NOTE — PROGRESS NOTES
Clinic Care Coordination Contact    Situation: Patient chart reviewed by care coordinator.    Background: RN CC reviewing chart for hospital discharge.    Assessment: Per Care Everywhere, patient remains inpatient.    Plan/Recommendations: RN CC will continue to monitor for hospital discharge.    Melissa Behl BSN, RN, N  [facility] Care Coordinator  [phone]

## 2018-03-16 NOTE — PROGRESS NOTES
Clinic Care Coordination Contact    Situation: Patient chart reviewed by care coordinator.    Background: RN CC reviewing chart for hospital discharge.    Assessment: Patient remains inpatient per Care Everywhere.    Plan/Recommendations: RN CC will continue to monitor for hospital discharge.    Melissa Behl BSN, RN, N  [facility] Care Coordinator  [phone]

## 2018-03-17 ENCOUNTER — TRANSFERRED RECORDS (OUTPATIENT)
Dept: HEALTH INFORMATION MANAGEMENT | Facility: CLINIC | Age: 53
End: 2018-03-17

## 2018-03-19 ENCOUNTER — TELEPHONE (OUTPATIENT)
Dept: INTERNAL MEDICINE | Facility: CLINIC | Age: 53
End: 2018-03-19

## 2018-03-19 ENCOUNTER — TRANSFERRED RECORDS (OUTPATIENT)
Dept: HEALTH INFORMATION MANAGEMENT | Facility: CLINIC | Age: 53
End: 2018-03-19

## 2018-03-19 ENCOUNTER — CARE COORDINATION (OUTPATIENT)
Dept: CARE COORDINATION | Facility: CLINIC | Age: 53
End: 2018-03-19

## 2018-03-19 NOTE — PROGRESS NOTES
Clinic Care Coordination Contact  Care Coordination Communication    Referral Source: [facility]    Clinical Data: Patient was hospitalized at [facility] from 3/5/18 to 3/17/18 with diagnosis of acute hypoxic respiratory failure, COPD with exacerbation, CHF, hypertension.     Home Care Contact:              Home Care Agency: [facility]              Contact name () and phone number: Jael Lázaro              Care Coordination contacted home care: Yes              Anticipated start of care date: Patient is open to home care    Patient Contact:   Clinic Care Coordination Contact  [place]/Voicemail    Referral Source: [facility]  Clinical Data: Care Coordinator Outreach  Outreach attempted x 1.  Left message on voicemail with call back information and requested return call.  Plan: Care Coordinator mailed out care coordination introduction letter on 2/20/18. Care Coordinator will try to reach patient again in 1-2 business days.    Melissa Behl BSN, RN, PHN  [facility] Care Coordinator  [phone]

## 2018-03-19 NOTE — TELEPHONE ENCOUNTER
Reason for Call: Request for an order or referral:    Order or referral being requested: Home care nurse would like orders for skilled nursing 3 times a week for 3 weeks, twice a week for 3 weeks. PT OT and RT eval and treat. Home health aide twice a week for 6 weeks.     Date needed: as soon as possible    Has the patient been seen by the PCP for this problem? YES    Additional comments: She had a fall yesterday at home. No new injuries.     Phone number Patient can be reached at:  Other phone number:  [facility] [phone]    Best Time:  any    Can we leave a detailed message on this number?  YES    Call taken on 3/19/2018 at 1:42 PM by Brandy Lawson

## 2018-03-19 NOTE — TELEPHONE ENCOUNTER
Reason for Call:  Same Day Appointment, Requested Provider:  Dorian Cade MD    PCP: Dorian Cade    Reason for visit: hosp follow up [facility] - COPD    Duration of symptoms:     Have you been treated for this in the past? Yes    Additional comments: needs to be seen this week    Can we leave a detailed message on this number? YES    Phone number patient can be reached at: Home number on file [phone] (home)    Best Time:     Call taken on 3/19/2018 at 4:33 PM by Tiffany Romero

## 2018-03-20 ENCOUNTER — OFFICE VISIT (OUTPATIENT)
Dept: INTERNAL MEDICINE | Facility: CLINIC | Age: 53
End: 2018-03-20
Payer: COMMERCIAL

## 2018-03-20 VITALS
RESPIRATION RATE: 16 BRPM | TEMPERATURE: 98.3 F | DIASTOLIC BLOOD PRESSURE: 80 MMHG | BODY MASS INDEX: 34.28 KG/M2 | WEIGHT: 187.4 LBS | OXYGEN SATURATION: 91 % | SYSTOLIC BLOOD PRESSURE: 132 MMHG | HEART RATE: 76 BPM

## 2018-03-20 DIAGNOSIS — B37.2 CANDIDIASIS OF SKIN: ICD-10-CM

## 2018-03-20 DIAGNOSIS — I10 ESSENTIAL HYPERTENSION WITH GOAL BLOOD PRESSURE LESS THAN 140/90: ICD-10-CM

## 2018-03-20 DIAGNOSIS — E11.65 TYPE 2 DIABETES MELLITUS WITH HYPERGLYCEMIA, WITHOUT LONG-TERM CURRENT USE OF INSULIN (H): ICD-10-CM

## 2018-03-20 DIAGNOSIS — J44.9 CHRONIC OBSTRUCTIVE PULMONARY DISEASE, UNSPECIFIED COPD TYPE (H): Primary | ICD-10-CM

## 2018-03-20 DIAGNOSIS — I25.10 CORONARY ARTERY DISEASE INVOLVING NATIVE CORONARY ARTERY OF NATIVE HEART, ANGINA PRESENCE UNSPECIFIED: ICD-10-CM

## 2018-03-20 PROCEDURE — 99495 TRANSJ CARE MGMT MOD F2F 14D: CPT | Performed by: INTERNAL MEDICINE

## 2018-03-20 RX ORDER — CARVEDILOL 25 MG/1
25 TABLET ORAL 2 TIMES DAILY WITH MEALS
COMMUNITY
End: 2018-04-16

## 2018-03-20 RX ORDER — ISOSORBIDE MONONITRATE 60 MG/1
120 TABLET, EXTENDED RELEASE ORAL 2 TIMES DAILY
Qty: 30 TABLET | COMMUNITY
Start: 2018-03-20 | End: 2018-04-13

## 2018-03-20 RX ORDER — NYSTATIN 100000 [USP'U]/G
POWDER TOPICAL
Qty: 60 G | Refills: 1 | Status: SHIPPED | OUTPATIENT
Start: 2018-03-20 | End: 2018-07-23

## 2018-03-20 ASSESSMENT — PAIN SCALES - GENERAL: PAINLEVEL: SEVERE PAIN (6)

## 2018-03-20 NOTE — PROGRESS NOTES
SUBJECTIVE:   Bernard Hughes is a 52 year old female who presents to clinic today for the following health issues:    Hospital Follow-up Visit:    Hospital/Nursing Home/IP Rehab Facility: [facility]   Date of Admission: 3-8-18  Date of Discharge: 3-17-18  Reason(s) for Admission: COPD            Problems taking medications regularly:  None       Medication changes since discharge: Carvedilol       Problems adhering to non-medication therapy:  None    Summary of hospitalization:  CareEverywhere information obtained and reviewed  Diagnostic Tests/Treatments reviewed.  Follow up needed: none  Other Healthcare Providers Involved in Patient s Care:         Homecare  Update since discharge: improved.     Post Discharge Medication Reconciliation: discharge medications reconciled and changed, per note/orders (see AVS).  Plan of care communicated with patient and family            She was in for 12 days, went home Saturday the 17th and then yesterday the 19th she had low sats at 76%, increased oxygen at home. Went to cardiology and didn't look good. Went to the ER yesterday and oxygen wasn't working right, was low oxygen.  New concentrator and line, running 3-4 liters of oxygen at a time.      Has pulmonary next Monday for revisit.     Carlos was thought to be COPD and had swelling.  Kidneys weren't working and weight was up over 200.   Past Medical History:   Diagnosis Date     ASCVD (arteriosclerotic cardiovascular disease)     3 vessel bypass 3/2017     COPD (chronic obstructive pulmonary disease) (H)      Diabetes (H)      Hypertension      Kidney disease      Current Outpatient Prescriptions   Medication     carvedilol (COREG) 25 MG tablet     atorvastatin (LIPITOR) 80 MG tablet     amLODIPine (NORVASC) 5 MG tablet     umeclidinium (INCRUSE ELLIPTA) 62.5 MCG/INH oral inhaler     PREDNISONE PO     FLUoxetine (PROZAC) 20 MG capsule     furosemide (LASIX) 20 MG tablet     order for DME     ALPRAZolam (XANAX)  0.5 MG tablet     fluticasone-salmeterol (ADVAIR) 100-50 MCG/DOSE diskus inhaler     glyBURIDE-metFORMIN (GLUCOVANCE) 5-500 MG per tablet     VENTOLIN  (90 BASE) MCG/ACT Inhaler     nystatin (MYCOSTATIN) 140671 UNIT/GM POWD     ipratropium - albuterol 0.5 mg/2.5 mg/3 mL (DUONEB) 0.5-2.5 (3) MG/3ML neb solution     tiotropium (SPIRIVA HANDIHALER) 18 MCG capsule     FLUoxetine (PROZAC) 40 MG capsule     NITROGLYCERIN SL     aspirin 81 MG EC tablet     doxycycline monohydrate 100 MG capsule     isosorbide mononitrate (IMDUR) 60 MG 24 hr tablet     metoprolol tartrate (LOPRESSOR) 100 MG tablet     nitroGLYcerin (NITROSTAT) 0.4 MG sublingual tablet     lisinopril (PRINIVIL/ZESTRIL) 20 MG tablet     Clopidogrel Bisulfate (PLAVIX PO)     No current facility-administered medications for this visit.      Social History   Substance Use Topics     Smoking status: Former Smoker     Smokeless tobacco: Never Used     Alcohol use No     Review of Systems  Constitutional-No fevers, chills, or weight changes..  ENT-No earpain, sore throat, voice changes or rhinitis.  Cardiac-No chest pain or palpitations and Exertional SOB.  Respiratory-SOB.  GI-No nausea, vomitting, diarrhea, constipation, or blood in the stool.  Skin-Rash.    Physical Exam  /80  Pulse 76  Temp 98.3  F (36.8  C) (Tympanic)  Resp 16  Wt 187 lb 6.4 oz (85 kg)  SpO2 91%  BMI 34.28 kg/m2  General Appearance-alert, no distress  Cardiac-regular rate and rhythm  with normal S1, S2 ; no murmur, rub or gallops  Lungs-mild to moderate decreased air movement  Extremities-no peripheral edema, peripheral pulses normal  Skin-yeast rash.      ASSESSMENT:     This is a 52-year-old woman who has a long history of severe COPD and congestive heart failure with coronary artery disease as well.  She has diabetes.  She has been hospitalized many times throughout the last year.  She was just in [facility] for 12 days for COPD exacerbation and congestive heart  failure.  Her swelling did improve.  She was taken off of prednisone and antibiotics.  She was told she had acute renal failure.  Her creatinine on care everywhere appears to have gone up to 1.8.  They switched her from metoprolol to Coreg and stopped her lisinopril.  She continues on metformin and glyburide combination pills.    The patient is doing better.  She still has some shortness of breath which is chronic, her lung sounds are decreased bilaterally.  Her weight is down to 187.  She is on her Lasix 20 mg a day.  She does have the new medications as listed.  It is noted she was in the ER yesterday due to some oxygen complications but now she has new machine and tubing and is doing well.    Candidiasis of the skin will give her some nystatin she is high risk with her immobility and diabetes and steroid use.    Electronically signed by Dorian Cade MD

## 2018-03-20 NOTE — NURSING NOTE
"Chief Complaint   Patient presents with     Hospital F/U     COPD       Initial /80  Pulse 76  Temp 98.3  F (36.8  C) (Tympanic)  Resp 16  Wt 187 lb 6.4 oz (85 kg)  SpO2 91%  BMI 34.28 kg/m2 Estimated body mass index is 34.28 kg/(m^2) as calculated from the following:    Height as of 2/20/18: 5' 2\" (1.575 m).    Weight as of this encounter: 187 lb 6.4 oz (85 kg).  Medication Reconciliation: complete  "

## 2018-03-20 NOTE — PROGRESS NOTES
Clinic Care Coordination Contact  Care Coordination Communication    Referral Source: [facility]      Patient Contact:               Introduced self and role of care coordination.               Discharge instructions were reviewed with patient/caregiver.               Do you have any questions about your medications? No, home care was out yesterday to see patient.  Patient's daughter typically sets up medications for her.              Follow up appointment is scheduled for 3/20/18.              Provided 24 Hour Nurse Line and/or 24 Hour Appointment Scheduling: Yes              Home care has contacted patient: Yes              Patient questions/concerns: none    Plan: RN/SW Care Coordinator will await notification from home care staff informing RN/SW Care Coordinator of patients discharge plans/needs. RN/SW Care Coordinator will review chart and outreach to home care every 4 weeks and as needed.      Melissa Behl BSN, RN, PHN  [facility] Care Coordinator  [phone]

## 2018-03-20 NOTE — MR AVS SNAPSHOT
"              After Visit Summary   3/20/2018    Bernard Hughes    MRN: [id number]           Patient Information     Date Of Birth          1965        Visit Information        Provider Department      3/20/2018 2:00 PM Dorian Cade MD [facility]         Follow-ups after your visit        Who to contact     If you have questions or need follow up information about today's clinic visit or your schedule please contact [facility] directly at [phone].  Normal or non-critical lab and imaging results will be communicated to you by [email or web address], letter or phone within 4 business days after the clinic has received the results. If you do not hear from us within 7 days, please contact the clinic through [email or web address] or phone. If you have a critical or abnormal lab result, we will notify you by phone as soon as possible.  Submit refill requests through Nexalogy or call your pharmacy and they will forward the refill request to us. Please allow 3 business days for your refill to be completed.          Additional Information About Your Visit        [email or web address] Information     Nexalogy lets you send messages to your doctor, view your test results, renew your prescriptions, schedule appointments and more. To sign up, go to [email or web address] . Click on \"Log in\" on the left side of the screen, which will take you to the Welcome page. Then click on \"Sign up Now\" on the right side of the page.     You will be asked to enter the access code listed below, as well as some personal information. Please follow the directions to create your username and password.     Your access code is: [email or web address]  Expires: 2018  5:22 PM     Your access code will  in 90 days. If you need help or a new code, please call your [facility] or [phone].        Care EveryWhere ID     This is your Care EveryWhere ID. This could be used by other organizations to access your [facility] medical " records  [id number]        Your Vitals Were     Pulse Temperature Respirations Pulse Oximetry BMI (Body Mass Index)       76 98.3  F (36.8  C) (Tympanic) 16 91% 34.28 kg/m2        Blood Pressure from Last 3 Encounters:   03/20/18 132/80   02/20/18 144/76   12/19/17 152/72    Weight from Last 3 Encounters:   03/20/18 187 lb 6.4 oz (85 kg)   02/20/18 197 lb (89.4 kg)   12/19/17 186 lb (84.4 kg)              Today, you had the following     No orders found for display       Primary Care Provider Office Phone # Fax #    Dorian Cade [phone]       [facility] [address] [place] [address]        Equal Access to Services     SUSIE MOLINA : Hadii jacek castellon hadasho Soomaali, waaxda luqadaha, qaybta kaalmada adeegyada, waxay francisca hayahmet scanlon . So [facility] [phone].    ATENCIÓN: Si habla español, tiene a hitchcock disposición servicios gratuitos de asistencia lingüística. Mónica al [phone].    We comply with applicable federal civil rights laws and [place] laws. We do not discriminate on the basis of race, color, national origin, age, disability, sex, sexual orientation, or gender identity.            Thank you!     Thank you for choosing [facility]  for your care. Our goal is always to provide you with excellent care. Hearing back from our patients is one way we can continue to improve our services. Please take a few minutes to complete the written survey that you may receive in the mail after your visit with us. Thank you!             Your Updated Medication List - Protect others around you: Learn how to safely use, store and throw away your medicines at www.disposemymeds.org.          This list is accurate as of 3/20/18  2:05 PM.  Always use your most recent med list.                   Brand Name Dispense Instructions for use Diagnosis    ALPRAZolam 0.5 MG tablet    XANAX    30 tablet    Take 1 tablet (0.5 mg) by mouth 3 times daily as needed for  anxiety (To use no more then twice a week.)    Adjustment disorder with anxious mood, Chronic obstructive pulmonary disease, unspecified COPD type (H)       amLODIPine 5 MG tablet    NORVASC     Take 2.5 mg by mouth daily        aspirin 81 MG EC tablet     90 tablet    Take 1 tablet (81 mg) by mouth daily    Type 2 diabetes, HbA1C goal < 8% (H), Hypertension goal BP (blood pressure) < 140/90       atorvastatin 80 MG tablet    LIPITOR    30 tablet    Take 1 tablet (80 mg) by mouth daily    Type 2 diabetes mellitus with hyperglycemia, without long-term current use of insulin (H)       carvedilol 25 MG tablet    COREG     Take 25 mg by mouth 2 times daily (with meals)        doxycycline monohydrate 100 MG capsule      Take 100 mg by mouth 2 times daily        * FLUoxetine 40 MG capsule    PROzac    90 capsule    Take 1 capsule (40 mg) by mouth daily    Adjustment disorder with depressed mood       * FLUoxetine 20 MG capsule    PROzac    270 capsule    Take 3 capsules (60 mg) by mouth daily    Recurrent major depressive disorder, remission status unspecified (H)       fluticasone-salmeterol 100-50 MCG/DOSE diskus inhaler    ADVAIR    1 Inhaler    Inhale 1 puff into the lungs 2 times daily as needed        furosemide 20 MG tablet    LASIX    90 tablet    Take 1 tablet (20 mg) by mouth daily    Chronic congestive heart failure, unspecified congestive heart failure type (H)       glyBURIDE-metFORMIN 5-500 MG per tablet    GLUCOVANCE    360 tablet    2 tablets in the AM and 2 at PM.    Type 2 diabetes mellitus with hyperglycemia, without long-term current use of insulin (H)       INCRUSE ELLIPTA 62.5 MCG/INH oral inhaler   Generic drug:  umeclidinium      Inhale 1 puff into the lungs daily        ipratropium - albuterol 0.5 mg/2.5 mg/3 mL 0.5-2.5 (3) MG/3ML neb solution    DUONEB    360 mL    NEBULIZE CONTENTS OF ONE VIAL EVERY 4 HOURS AS NEEDED FOR SHORTNESS OF BREATH / DYSPNEA OR WHEEZING    Chronic obstructive pulmonary  "disease with acute exacerbation (H), SOB (shortness of breath)       isosorbide mononitrate 60 MG 24 hr tablet    IMDUR     Take 60 mg by mouth daily        lisinopril 20 MG tablet    PRINIVIL/ZESTRIL    180 tablet    Take 2 tablets (40 mg) by mouth daily    Essential hypertension with goal blood pressure less than 140/90       metoprolol tartrate 100 MG tablet    LOPRESSOR     Take 100 mg by mouth 2 times daily        * NITROGLYCERIN SL      Place 0.4 mg under the tongue every 5 minutes as needed for chest pain        * nitroGLYcerin 0.4 MG sublingual tablet    NITROSTAT    25 tablet    For chest pain place 1 tablet under the tongue every 5 minutes for 3 doses. If symptoms persist 5 minutes after 1st dose call 911.    Coronary artery disease involving native coronary artery of native heart, angina presence unspecified       nystatin 375763 UNIT/GM Powd    MYCOSTATIN    30 g    Apply topically 3 times daily as needed    Candidiasis of skin       order for DME     1 Device    2 wheel walker with rear ski glides Height:  5'4\" Weight:  173lbs    Chronic obstructive pulmonary disease, unspecified COPD type (H), Difficulty walking       PLAVIX PO      Take 75 mg by mouth daily        PREDNISONE PO      Sliding scale        tiotropium 18 MCG capsule    SPIRIVA HANDIHALER    90 capsule    USING THE HANDIHALER, INHALE THE CONTENTS OF ONE CAPSULE DAILY    Chronic obstructive pulmonary disease, unspecified COPD type (H)       VENTOLIN  (90 BASE) MCG/ACT Inhaler   Generic drug:  albuterol     18 g    INHALE 2 PUFFS INTO THE LUNGS EVERY 4 HOURS AS NEEDED FOR SHORTNESS OF BREATH, DIFFICULTY BREATHING OR WHEEZING.    Chronic obstructive pulmonary disease, unspecified COPD type (H)       * Notice:  This list has 4 medication(s) that are the same as other medications prescribed for you. Read the directions carefully, and ask your doctor or other care provider to review them with you.      "

## 2018-04-03 ENCOUNTER — TRANSFERRED RECORDS (OUTPATIENT)
Dept: HEALTH INFORMATION MANAGEMENT | Facility: CLINIC | Age: 53
End: 2018-04-03

## 2018-04-10 ENCOUNTER — PATIENT OUTREACH (OUTPATIENT)
Dept: CARE COORDINATION | Facility: CLINIC | Age: 53
End: 2018-04-10

## 2018-04-10 ENCOUNTER — TELEPHONE (OUTPATIENT)
Dept: CARE COORDINATION | Facility: CLINIC | Age: 53
End: 2018-04-10

## 2018-04-10 NOTE — TELEPHONE ENCOUNTER
DC'd from [facility] on 4/9/18 to Home health   Primary Problem: COPD exacerbation   LACE: 72 High

## 2018-04-11 ENCOUNTER — TELEPHONE (OUTPATIENT)
Dept: INTERNAL MEDICINE | Facility: CLINIC | Age: 53
End: 2018-04-11

## 2018-04-11 RX ORDER — TRAZODONE HYDROCHLORIDE 50 MG/1
50-100 TABLET, FILM COATED ORAL
COMMUNITY
Start: 2018-04-09 | End: 2018-06-19

## 2018-04-11 RX ORDER — AZITHROMYCIN 250 MG/1
250 TABLET, FILM COATED ORAL DAILY
COMMUNITY
Start: 2018-04-09 | End: 2018-04-14

## 2018-04-11 RX ORDER — PREDNISONE 20 MG/1
20 TABLET ORAL DAILY
COMMUNITY
Start: 2018-04-10 | End: 2018-04-17

## 2018-04-11 NOTE — PROGRESS NOTES
Clinic Care Coordination Contact  Care Team Conversations    Pt is asking if she can change her appt from Friday to Monday due to  working Friday and not finding a ride.     Elaina ORDAZ, RN, PHN  Care Coordination    [facility]  [address] [place] [address]  Office: [phone]  Fax [phone]   [facility]  [address] [place] [address]  Office: [phone] Fax [phone]  [email or web address]   [email or web address]   Connect with [facility] on social media.

## 2018-04-11 NOTE — TELEPHONE ENCOUNTER
Reason for Call:  Same Day Appointment, Requested Provider:  Dorian Cade MD    PCP: Dorian Cade    Reason for visit: hospital f/u     Duration of symptoms:     Have you been treated for this in the past?     Additional comments: Patient called and states that she was discharged from [facility] on Monday 4/9 after spending a week there for COPD and pneumonia. She said that she was told she needs to be seen by PCP by Friday 4/13. She is wondering if Dr. Cade would be able to work her into his schedule to be seen for the follow up. Please advise.     Can we leave a detailed message on this number? YES    Phone number patient can be reached at: Home number on file [phone] (home)    Best Time: any     Call taken on 4/11/2018 at 1:05 PM by Florentin Smith

## 2018-04-11 NOTE — PROGRESS NOTES
Clinic Care Coordination Contact    OUTREACH    Referral Information:     Primary Diagnosis: COPD    Admission Date: 4/3/2018  Discharge Date: 4/9/2018    She will be discharged from [facility] to home.    DISCHARGE DIAGNOSIS:   1. Acute exacerbation of severe copd with chronic respiratory failure  2. Severe Anxiety related to above  3. Hx ASCVD  - stenting LAD/circ 3/17  4. Hx hypertension  5. DM 2    Chief Complaint   Patient presents with     Clinic Care Coordination - [facility]     RN assessment        Universal Utilization: uses [facility]  Utilization    Last refreshed: 4/11/2018  3:25 PM:  No Show Count (past year) 4       Last refreshed: 4/11/2018  3:25 PM:  ED visits 0       Last refreshed: 4/11/2018  3:25 PM:  Hospital admissions 0          Current as of: 4/11/2018  3:25 PM           Clinical Concerns:  Current Medical Concerns:  Called and spoke with pt, states she was in the hospital for a week for her copd and pneumonia.  States she continues to be fatigued.  She is using her oxygen at 4 liters and feels like she is breathing ok.  She has been using her inhalers and nebulizer 3 times a day. She is also taking Zithromax for 5 days but does not feel this medication has worked for her well.  She is on prednisone 20mg x 7 days.  She is coughing up green phlegm but denies any fever or chills.  Pt states she was told to be seen on Friday however, her  works Friday-Sundays and he will not be able to give her a ride.  She is asking if she can move the appt to Monday so he can be there and drive her.  Pt does have [facility] coming out to see her which include RN, PT,OT,  HHA,   Current Behavioral Concerns: Pt states she has been very depressed and crying just out of the blue.  States she was told by her pulmonologist that she will be dead in 5 years or will need a lung transplant. States she is wanting to get in to see someone to help her.  Talked about  counseling/phycologist/psychiatrist options.  Pt will contact her insurance and see where she is covered.  She [place] in [place] so will see if there are services closer to her home. Otherwise suggested clement or [facility]. Pt states she will call her insurance company and see where she can go.  Pt states she is taking her Prozac 60mg.   Education Provided to patient: continue to take medications as prescribed until seen.    Clinical Pathway Name: COPD  Health Maintenance Reviewed:      Medication Management:  Medications reviewed Self Management      Functional Status:     Equipment Currently Used at Home: cane, straight, oxygen, shower chair, walker, rolling, walker, standard, other (see comments)  Transportation:  Transportation means:: Regular car, Family     Psychosocial:  Current living arrangement:: I live in a private home with family     Financial/Insurance: no current concerns     Resources and Interventions:  Current Resources:  ;    Advanced Care Plans/Directives on file:: No    Patient/Caregiver understanding: Pt verbalizes understanding regarding medications and follow up plan.  Outreach Frequency: weekly  Future Appointments              In 2 days Dorian Cade MD [facility]    In 2 days Ezekiel Desai [facility]           Plan:   Pt will continue to take medications as prescribed  Pt will continue to work with Home care  RN will send message about changing appt to Monday  RN will outreach in 1-2 days  RN will outreach to Home Care RN

## 2018-04-13 ENCOUNTER — ALLIED HEALTH/NURSE VISIT (OUTPATIENT)
Dept: PHARMACY | Facility: CLINIC | Age: 53
End: 2018-04-13
Payer: COMMERCIAL

## 2018-04-13 DIAGNOSIS — I25.10 CORONARY ARTERY DISEASE INVOLVING NATIVE CORONARY ARTERY OF NATIVE HEART, ANGINA PRESENCE UNSPECIFIED: ICD-10-CM

## 2018-04-13 DIAGNOSIS — G47.00 INSOMNIA, UNSPECIFIED TYPE: ICD-10-CM

## 2018-04-13 DIAGNOSIS — E11.65 TYPE 2 DIABETES MELLITUS WITH HYPERGLYCEMIA, WITHOUT LONG-TERM CURRENT USE OF INSULIN (H): ICD-10-CM

## 2018-04-13 DIAGNOSIS — J44.9 CHRONIC OBSTRUCTIVE PULMONARY DISEASE, UNSPECIFIED COPD TYPE (H): Primary | ICD-10-CM

## 2018-04-13 DIAGNOSIS — F43.21 ADJUSTMENT DISORDER WITH DEPRESSED MOOD: ICD-10-CM

## 2018-04-13 DIAGNOSIS — E78.5 HYPERLIPIDEMIA LDL GOAL <100: ICD-10-CM

## 2018-04-13 DIAGNOSIS — I10 ESSENTIAL HYPERTENSION WITH GOAL BLOOD PRESSURE LESS THAN 140/90: ICD-10-CM

## 2018-04-13 PROCEDURE — 99605 MTMS BY PHARM NP 15 MIN: CPT | Mod: U4 | Performed by: PHARMACIST

## 2018-04-13 PROCEDURE — 99607 MTMS BY PHARM ADDL 15 MIN: CPT | Mod: U4 | Performed by: PHARMACIST

## 2018-04-13 RX ORDER — ISOSORBIDE MONONITRATE 120 MG/1
120 TABLET, EXTENDED RELEASE ORAL 2 TIMES DAILY
COMMUNITY
End: 2018-04-22

## 2018-04-13 RX ORDER — GUAIFENESIN 600 MG/1
1200 TABLET, EXTENDED RELEASE ORAL 2 TIMES DAILY
COMMUNITY
End: 2018-04-16

## 2018-04-13 RX ORDER — FUROSEMIDE 40 MG
40 TABLET ORAL DAILY
COMMUNITY
End: 2018-04-29

## 2018-04-13 RX ORDER — CLOPIDOGREL BISULFATE 75 MG/1
75 TABLET ORAL DAILY
COMMUNITY
End: 2018-04-22

## 2018-04-13 NOTE — PROGRESS NOTES
"SUBJECTIVE/OBJECTIVE:                Bernard Hughes is a 52 year old female called for a follow-up visit for Medication Therapy Management.  She was referred to me from her [employer] insurance plan.  She was recently discharged from [facility] for COPD exacerbation.     Chief Complaint: Follow up from our visit on 7/21/17.    Personal Healthcare Goals:     Allergies/ADRs: Reviewed in Epic  Tobacco: History of tobacco dependence - quit 2+ years ago   Alcohol: none  Caffeine: 2-3 cups/day of coffee  Activity: \"Someday are better than others\" - patient is doing cardiac rehab.   PMH: Reviewed in Epic    Medication Adherence/Access:  no issues reported    COPD: Use Duoneb nebulizer 3-4 times per day. Albuterol inhaler as needed (is using once daily usually),  Advair twice daily, and Incruse Ellipta once daily. Does rinse her mouth after Advair use. Was discharged from the hospital on 4/9 with a prescription for azithromycin which she will finish tomorrow. She feels like this has improved the breathing. Today is the best she has felt all week long. Uses mucinex 600 mg ER twice daily. Was given prednisone 20 mg tablets once daily for 7 days - states Mandi may put her on up to 30 days of prednisone 5 mg daily. Takes in the morning. Uses bipap at night. Is on 4L/min of oxygen currently. Does have Mucinex available BID.     Anxiety: Takes fluoxetine 60 mg daily (40mg + 20mg daily). Has been on this dose for ~6 month. Takes alprazolam 0.5 mg three times daily as needed. Does not like to take it because she wants to avoid becoming addicted. Mood is somewhat poorly controlled especially with recent health issues. Denies side effects. She does mention that she plans to talk to PCP about this.      Hypertension/CAD: Current medications include amlodipine 2.5 mg daily, carvedilol 25 mg twice daily, furosemide 40mg daily (was recently increased from 20 mg daily), aspirin 81 mg daily, clopidogrel 75 mg daily, and " isosorbide 120 mg twice daily. Has nitroglycerin available PRN, but has not needed. States swelling has been significantly reduced on this dose. Told no more Chinese food by home care nurse and has a dietician coming into her home soon. Patient does self-monitor BP. Home BP monitoring in range of 120's systolic over 60's diastolic.  Patient reports no current medication side effects.    Hyperlipidemia: Takes atorvastatin 80 mg once daily in the evening.  Pt reports no significant myalgias or other side effects.     Insomnia: Was started on trazodone  mg daily at night as needed. Feels this has been helping.     Diabetes:  Pt currently taking glyburide-metformin 5-500 mg 2 tablets in AM and 2 tablets in PM. Pt is not experiencing side effects.  SMBG: one time daily - tries to conserve her test strips due to cost Ranges (patient reported): ~200s mg/dL while on prednisone.  Patient is not experiencing hypoglycemia  Recent symptoms of high blood sugar? polydipsia, polyuria and insomnia. Does have nystatin powder available if she develops a yeast infection.  Aspirin: Taking 81mg daily and denies side effects  Diet/Exercise: Is trying to improve her diet. Eats salads, fish, rice, vegetables, and avoids extra carbs.     Current labs include:  Today's Vitals: telephone encounter, no vitals  BP Readings from Last 3 Encounters:   03/20/18 132/80   02/20/18 144/76   12/19/17 152/72     Lab Results   Component Value Date    A1C 7.6 02/20/2018   .  Lab Results   Component Value Date    CHOL 164 07/26/2017     Lab Results   Component Value Date    TRIG 108 07/26/2017     Lab Results   Component Value Date    HDL 62 07/26/2017     Lab Results   Component Value Date    LDL 80 07/26/2017       Liver Function Studies -   Recent Labs   Lab Test  02/20/18   1657   PROTTOTAL  6.1*   ALBUMIN  2.7*   BILITOTAL  0.3   ALKPHOS  103   AST  13   ALT  34       Last Basic Metabolic Panel:  Lab Results   Component Value Date      02/20/2018      Lab Results   Component Value Date    POTASSIUM 4.9 03/05/2018     Lab Results   Component Value Date    CHLORIDE 95 02/20/2018     Lab Results   Component Value Date    BUN 27 02/20/2018     Lab Results   Component Value Date    CR 1.18 03/05/2018     GFR Estimate   Date Value Ref Range Status   03/05/2018 48 (L) >60 ml/min/1.73m2 Final   02/20/2018 55 (L) >60 mL/min/1.7m2 Final     Comment:     Non  GFR Calc   10/28/2017 >60 >60 ml/min/1.73m2 Final     TSH   Date Value Ref Range Status   07/26/2017 0.80 0.40 - 4.00 mU/L Final     Most Recent Immunizations   Administered Date(s) Administered     Influenza Vaccine IM 3yrs+ 4 Valent IIV4 10/26/2015     Pneumo Conj 13-V (2010&after) 07/26/2017     TDAP Vaccine (Adacel) 07/26/2017       ASSESSMENT:              Current medications were reviewed today as discussed above.      Medication Adherence: good, no issues identified    COPD: Improved.  Pt benefiting from steroid/oxygen.  May benefit from extended taper, but would need to monitor hyperglycemia.    Anxiety: Needs Improvement. Pt may benefit from talking to PCP about further dose titration of fluoxetine.    Hypertension/CAD: Improved. Pt is meeting BP goal of <140/90 mmHg.     Hyperlipidemia: Stable. Pt is on high intensity statin which is indicated based on 2013 ACC/AHA guidelines for lipid management.      Insomnia: Improved.     Diabetes:  Needs Improvement. Pt is meeting A1c goal of <8%.  Current SMBG is elevated likely due to prednisone and would benefit from close monitoring to ensure they return to normal.      PLAN:                  1. Pt to discuss fluoxetine dosing with PCP.    I spent 40 minutes with this patient today. A copy of the visit note was provided to the patient's primary care provider.     Will follow up in 4-6 weeks.    The patient was mailed a summary of these recommendations as an after visit summary.    Brent Desai, RupaD, BCACP  Medication Therapy Management  Pharmacist  Pager: [phone]

## 2018-04-13 NOTE — MR AVS SNAPSHOT
After Visit Summary   4/13/2018    Bernard Hughes    MRN: [id number]           Patient Information     Date Of Birth          1965        Visit Information        Provider Department      4/13/2018 2:00 PM Ezekiel Desai, [facility] MTM        Today's Diagnoses     Chronic obstructive pulmonary disease, unspecified COPD type (H)    -  1    Adjustment disorder with depressed mood        Coronary artery disease involving native coronary artery of native heart, angina presence unspecified        Essential hypertension with goal blood pressure less than 140/90        Hyperlipidemia LDL goal <100        Insomnia, unspecified type        Type 2 diabetes mellitus with hyperglycemia, without long-term current use of insulin (H)          Care Instructions    Recommendations from today's MTM visit:                                                    MTM (medication therapy management) is a service provided by a clinical pharmacist designed to help you get the most of out of your medicines.   Today we reviewed what your medicines are for, how to know if they are working, that your medicines are safe and how to make your medicine regimen as easy as possible.     1. Continue current medications.    Next MTM visit: As Needed    To schedule another MTM appointment, please call the clinic directly or you may call the MTM scheduling line at [phone] or toll-free at [phone].     My Clinical Pharmacist's contact information:                                                      It was a pleasure seeing you today!  Please feel free to contact me with any questions or concerns you have.      Brent Desai, PharmD, [facility]  Medication Therapy Management Pharmacist  Pager: [phone]    You may receive a survey about the MTM services you received.  I would appreciate your feedback to help me serve you better in the future. Please fill it out and return it when you can. Your comments will  "be anonymous.              Follow-ups after your visit        Who to contact     If you have questions or need follow up information about today's clinic visit or your schedule please contact [facility] directly at [phone].  Normal or non-critical lab and imaging results will be communicated to you by MyChart, letter or phone within 4 business days after the clinic has received the results. If you do not hear from us within 7 days, please contact the clinic through MyChart or phone. If you have a critical or abnormal lab result, we will notify you by phone as soon as possible.  Submit refill requests through [employer] or call your pharmacy and they will forward the refill request to us. Please allow 3 business days for your refill to be completed.          Additional Information About Your Visit        [employer] Information     [employer] lets you send messages to your doctor, view your test results, renew your prescriptions, schedule appointments and more. To sign up, go to [email or web address] . Click on \"Log in\" on the left side of the screen, which will take you to the Welcome page. Then click on \"Sign up Now\" on the right side of the page.     You will be asked to enter the access code listed below, as well as some personal information. Please follow the directions to create your username and password.     Your access code is: [email or web address]  Expires: 2018  5:22 PM     Your access code will  in 90 days. If you need help or a new code, please call your [place] clinic or [phone].        Care EveryWhere ID     This is your Care EveryWhere ID. This could be used by other organizations to access your [place] medical records  [id number]         Blood Pressure from Last 3 Encounters:   18 116/72   18 132/80   18 144/76    Weight from Last 3 Encounters:   18 195 lb (88.5 kg)   18 187 lb 6.4 oz (85 kg)   18 197 lb (89.4 kg)              Today, you " had the following     No orders found for display       Primary Care Provider Office Phone # Fax #    Dorian Cade [phone]       [address] [facility] [address]        Goals        General    Transportation       Equal Access to Services     SUSIE JESSE : Hadii jacek ravinder christio Sobeeali, waaxda luqadaha, qaybta kaalmada adeegyada, waxsapphire christiansonn felice lea laNormanahmet chiang. So [facility] [phone].    ATENCIÓN: Si habla español, tiene a hitchcock disposición servicios gratuitos de asistencia lingüística. Llame al [phone].    We comply with applicable federal civil rights laws and [place] laws. We do not discriminate on the basis of race, color, national origin, age, disability, sex, sexual orientation, or gender identity.            Thank you!     Thank you for choosing [facility]  for your care. Our goal is always to provide you with excellent care. Hearing back from our patients is one way we can continue to improve our services. Please take a few minutes to complete the written survey that you may receive in the mail after your visit with us. Thank you!             Your Updated Medication List - Protect others around you: Learn how to safely use, store and throw away your medicines at www.disposemymeds.org.          This list is accurate as of 4/13/18 11:59 PM.  Always use your most recent med list.                   Brand Name Dispense Instructions for use Diagnosis    ALPRAZolam 0.5 MG tablet    XANAX    30 tablet    Take 1 tablet (0.5 mg) by mouth 3 times daily as needed for anxiety (To use no more then twice a week.)    Adjustment disorder with anxious mood, Chronic obstructive pulmonary disease, unspecified COPD type (H)       aspirin 81 MG EC tablet     90 tablet    Take 1 tablet (81 mg) by mouth daily    Type 2 diabetes, HbA1C goal < 8% (H), Hypertension goal BP (blood pressure) < 140/90       atorvastatin 80 MG tablet    LIPITOR    30 tablet    Take 1 tablet  "(80 mg) by mouth daily    Type 2 diabetes mellitus with hyperglycemia, without long-term current use of insulin (H)       azithromycin 250 MG tablet    ZITHROMAX     Take 250 mg by mouth daily        clopidogrel 75 MG tablet    PLAVIX     Take 75 mg by mouth daily        fluticasone-salmeterol 100-50 MCG/DOSE diskus inhaler    ADVAIR    1 Inhaler    Inhale 1 puff into the lungs 2 times daily as needed        furosemide 40 MG tablet    LASIX     Take 40 mg by mouth daily        glyBURIDE-metFORMIN 5-500 MG per tablet    GLUCOVANCE    360 tablet    2 tablets in the AM and 2 at PM.    Type 2 diabetes mellitus with hyperglycemia, without long-term current use of insulin (H)       INCRUSE ELLIPTA 62.5 MCG/INH oral inhaler   Generic drug:  umeclidinium      Inhale 1 puff into the lungs daily        ipratropium - albuterol 0.5 mg/2.5 mg/3 mL 0.5-2.5 (3) MG/3ML neb solution    DUONEB    360 mL    NEBULIZE CONTENTS OF ONE VIAL EVERY 4 HOURS AS NEEDED FOR SHORTNESS OF BREATH / DYSPNEA OR WHEEZING    Chronic obstructive pulmonary disease with acute exacerbation (H), SOB (shortness of breath)       Isosorbide Mononitrate  MG Tb24      Take 120 mg by mouth 2 times daily        nitroGLYcerin 0.4 MG sublingual tablet    NITROSTAT    25 tablet    For chest pain place 1 tablet under the tongue every 5 minutes for 3 doses. If symptoms persist 5 minutes after 1st dose call 911.    Coronary artery disease involving native coronary artery of native heart, angina presence unspecified       nystatin 811252 UNIT/GM Powd    MYCOSTATIN    60 g    Use as needed to skin    Candidiasis of skin       order for DME     1 Device    2 wheel walker with rear ski glides Height:  5'4\" Weight:  173lbs    Chronic obstructive pulmonary disease, unspecified COPD type (H), Difficulty walking       predniSONE 20 MG tablet    DELTASONE     Take 20 mg by mouth daily        traZODone 50 MG tablet    DESYREL     Take  mg by mouth nightly as needed "        VENTOLIN  (90 Base) MCG/ACT Inhaler   Generic drug:  albuterol     18 g    INHALE 2 PUFFS INTO THE LUNGS EVERY 4 HOURS AS NEEDED FOR SHORTNESS OF BREATH, DIFFICULTY BREATHING OR WHEEZING.    Chronic obstructive pulmonary disease, unspecified COPD type (H)

## 2018-04-16 ENCOUNTER — OFFICE VISIT (OUTPATIENT)
Dept: INTERNAL MEDICINE | Facility: CLINIC | Age: 53
End: 2018-04-16
Payer: COMMERCIAL

## 2018-04-16 VITALS
SYSTOLIC BLOOD PRESSURE: 116 MMHG | RESPIRATION RATE: 16 BRPM | HEART RATE: 74 BPM | WEIGHT: 195 LBS | TEMPERATURE: 97.3 F | BODY MASS INDEX: 35.67 KG/M2 | DIASTOLIC BLOOD PRESSURE: 72 MMHG | OXYGEN SATURATION: 92 %

## 2018-04-16 DIAGNOSIS — I10 ESSENTIAL HYPERTENSION WITH GOAL BLOOD PRESSURE LESS THAN 140/90: ICD-10-CM

## 2018-04-16 DIAGNOSIS — F33.9 RECURRENT MAJOR DEPRESSIVE DISORDER, REMISSION STATUS UNSPECIFIED (H): ICD-10-CM

## 2018-04-16 DIAGNOSIS — J18.9 PNEUMONIA OF LEFT UPPER LOBE DUE TO INFECTIOUS ORGANISM: Primary | ICD-10-CM

## 2018-04-16 DIAGNOSIS — J44.9 CHRONIC OBSTRUCTIVE PULMONARY DISEASE, UNSPECIFIED COPD TYPE (H): ICD-10-CM

## 2018-04-16 DIAGNOSIS — R05.9 COUGH: ICD-10-CM

## 2018-04-16 PROCEDURE — 99496 TRANSJ CARE MGMT HIGH F2F 7D: CPT | Performed by: INTERNAL MEDICINE

## 2018-04-16 RX ORDER — PREDNISONE 10 MG/1
10 TABLET ORAL DAILY
Qty: 10 TABLET | Refills: 0 | Status: SHIPPED | OUTPATIENT
Start: 2018-04-16 | End: 2018-05-30

## 2018-04-16 RX ORDER — AMLODIPINE BESYLATE 5 MG/1
2.5 TABLET ORAL DAILY
Qty: 30 TABLET | Status: CANCELLED | OUTPATIENT
Start: 2018-04-16

## 2018-04-16 RX ORDER — CARVEDILOL 25 MG/1
25 TABLET ORAL 2 TIMES DAILY WITH MEALS
Qty: 60 TABLET | Status: CANCELLED | OUTPATIENT
Start: 2018-04-16

## 2018-04-16 RX ORDER — CEFUROXIME AXETIL 500 MG/1
500 TABLET ORAL 2 TIMES DAILY
Qty: 20 TABLET | Refills: 0 | Status: SHIPPED | OUTPATIENT
Start: 2018-04-16 | End: 2018-05-30

## 2018-04-16 RX ORDER — GUAIFENESIN 600 MG/1
600 TABLET, EXTENDED RELEASE ORAL 2 TIMES DAILY
Qty: 60 TABLET | Refills: 3 | Status: SHIPPED | OUTPATIENT
Start: 2018-04-16

## 2018-04-16 RX ORDER — CARVEDILOL 25 MG/1
25 TABLET ORAL 2 TIMES DAILY WITH MEALS
Qty: 180 TABLET | Refills: 3 | Status: SHIPPED | OUTPATIENT
Start: 2018-04-16 | End: 2019-01-01 | Stop reason: DRUGHIGH

## 2018-04-16 RX ORDER — AMLODIPINE BESYLATE 5 MG/1
2.5 TABLET ORAL DAILY
Qty: 45 TABLET | Refills: 3 | Status: SHIPPED | OUTPATIENT
Start: 2018-04-16 | End: 2019-01-01

## 2018-04-16 ASSESSMENT — PAIN SCALES - GENERAL: PAINLEVEL: MODERATE PAIN (5)

## 2018-04-16 NOTE — PROGRESS NOTES
SUBJECTIVE:   Bernard Hughes is a 52 year old female who presents to clinic today for the following health issues:      Hospital Follow-up Visit:    Hospital[facility]Nursing Home/IP Rehab [facility]  Date of Admission: 4/2/18  Date of Discharge: 4/9/18  Reason(s) for Admission: pneumonia            Problems taking medications regularly:  None       Medication changes since discharge: None       Problems adhering to non-medication therapy:  None    Summary of hospitalization:  See outside records, reviewed and scanned  Diagnostic Tests/Treatments reviewed.  Follow up needed: none  Other Healthcare Providers Involved in Patient s Care:         Homecare  Update since discharge: improved.     Post Discharge Medication Reconciliation: discharge medications reconciled and changed, per note/orders (see AVS).  Plan of care communicated with patient and         She was in with left lung pneumonia,  had been sick first.  She was in for 7 days, oxygen wouldn't stay up.  Ct showed infiltrate, went home on zpak and steroids.      Wonders about depression not feeling great, interested in counseling,     Homecare had some questions.      Wonders about another antibiotic, still coughing green sputum.  More sputum today.  Had zpak.  Prednisone is done today.    Past Medical History:   Diagnosis Date     ASCVD (arteriosclerotic cardiovascular disease)     3 vessel bypass 3/2017     COPD (chronic obstructive pulmonary disease) (H)      Diabetes (H)      Hypertension      Kidney disease      Current Outpatient Prescriptions   Medication     FLUoxetine (PROZAC) 20 MG capsule     guaiFENesin (MUCINEX) 600 MG 12 hr tablet     carvedilol (COREG) 25 MG tablet     amLODIPine (NORVASC) 5 MG tablet     clopidogrel (PLAVIX) 75 MG tablet     furosemide (LASIX) 40 MG tablet     Isosorbide Mononitrate  MG TB24     traZODone (DESYREL) 50 MG tablet     predniSONE (DELTASONE) 20 MG tablet     atorvastatin (LIPITOR) 80 MG tablet      ALPRAZolam (XANAX) 0.5 MG tablet     fluticasone-salmeterol (ADVAIR) 100-50 MCG/DOSE diskus inhaler     glyBURIDE-metFORMIN (GLUCOVANCE) 5-500 MG per tablet     VENTOLIN  (90 BASE) MCG/ACT Inhaler     ipratropium - albuterol 0.5 mg/2.5 mg/3 mL (DUONEB) 0.5-2.5 (3) MG/3ML neb solution     aspirin 81 MG EC tablet     nystatin (MYCOSTATIN) 443394 UNIT/GM POWD     [DISCONTINUED] carvedilol (COREG) 25 MG tablet     umeclidinium (INCRUSE ELLIPTA) 62.5 MCG/INH oral inhaler     nitroGLYcerin (NITROSTAT) 0.4 MG sublingual tablet     [DISCONTINUED] amLODIPine (NORVASC) 5 MG tablet     [DISCONTINUED] FLUoxetine (PROZAC) 20 MG capsule     order for DME     No current facility-administered medications for this visit.      Social History   Substance Use Topics     Smoking status: Former Smoker     Smokeless tobacco: Never Used     Alcohol use No     Review of Systems  Constitutional-No fevers, chills, or weight changes..  ENT-No earpain, sore throat, voice changes or rhinitis.  Cardiac-No chest pain or palpitations and Exertional SOB.  Respiratory-Sputum production and SOB.  GI-No nausea, vomitting, diarrhea, constipation, or blood in the stool.  Musculoskeletal-No muscles aches or joint pains.    Physical Exam  /72  Pulse 74  Temp 97.3  F (36.3  C) (Temporal)  Resp 16  Wt 195 lb (88.5 kg)  SpO2 92%  BMI 35.67 kg/m2  General Appearance-alert, no distress  Cardiac-regular rate and rhythm  with normal S1, S2 ; no murmur, rub or gallops  Lungs-moderately-severe decreased air movement and mild expiratory rhonchi  Extremities-trace edema with teds in place    ASSESSMENT:  52-year-old woman who has a history of severe COPD, she is on home oxygen.  She ends up with frequent hospitalizations for pneumonia.  Orders COPD exacerbations.  She was just in from April 2 - April 9.  Diagnosed with a left upper lobe pneumonia on CT scan.  She was treated with IV antibiotics and prednisone, home with oral Zithromax and  prednisone 20 mg a day.  I will be finishing today.    The patient is better but still not back to normal.  She does have some decreased breath sounds mild crackles throughout.  We will give her another antibiotic of Ceftin 500 mg twice a day extended prednisone 10 mg a day for 10 more days.    She more depression due to medical issues we will increase her Prozac from 60-80 mg a day and refer her for counseling.    Diabetes the patient's diabetes is elevated with steroid she will try to watch this closer should improve when she goes off of these in 10 days.    Frequent hospitalizations we did discuss possibly long-term prednisone with side effects affecting her diabetes and other issues will try to avoid it if possible.      Electronically signed by Dorian Cade MD

## 2018-04-16 NOTE — NURSING NOTE
"Chief Complaint   Patient presents with     Hospital F/U       Initial /72  Pulse 74  Temp 97.3  F (36.3  C) (Temporal)  Resp 16  Wt 195 lb (88.5 kg)  SpO2 92%  BMI 35.67 kg/m2 Estimated body mass index is 35.67 kg/(m^2) as calculated from the following:    Height as of 2/20/18: 5' 2\" (1.575 m).    Weight as of this encounter: 195 lb (88.5 kg).  Medication Reconciliation: complete    "

## 2018-04-16 NOTE — MR AVS SNAPSHOT
"              After Visit Summary   4/16/2018    Bernard Hughes    MRN: [id number]           Patient Information     Date Of Birth          1965        Visit Information        Provider Department      4/16/2018 12:00 PM Dorian Cade MD [facility]         Follow-ups after your visit        Your next 10 appointments already scheduled     Apr 16, 2018 12:00 PM CDT   [facility]    [address] [place] [id number]   [phone]           Bring a current list of meds and any records pertaining to this visit. For Physicals, please bring immunization records and any forms needing to be filled out. Please arrive 10 minutes early to complete paperwork.              Who to contact     If you have questions or need follow up information about today's clinic visit or your schedule please contact [facility] directly at [phone].  Normal or non-critical lab and imaging results will be communicated to you by MyChart, letter or phone within 4 business days after the clinic has received the results. If you do not hear from us within 7 days, please contact the clinic through [employer] or phone. If you have a critical or abnormal lab result, we will notify you by phone as soon as possible.  Submit refill requests through [employer] or call your pharmacy and they will forward the refill request to us. Please allow 3 business days for your refill to be completed.          Additional Information About Your Visit        [employer] Information     [employer] lets you send messages to your doctor, view your test results, renew your prescriptions, schedule appointments and more. To sign up, go to [email or web address] . Click on \"Log in\" on the left side of the screen, which will take you to the Welcome page. Then click on \"Sign up Now\" on the right side of the page.     You will be asked to enter the access " code listed below, as well as some personal information. Please follow the directions to create your username and password.     Your access code is: [email or web address]  Expires: 2018  5:22 PM     Your access code will  in 90 days. If you need help or a new code, please call your [place] clinic or [phone].        Care EveryWhere ID     This is your Care EveryWhere ID. This could be used by other organizations to access your [place] medical records  [id number]        Your Vitals Were     Pulse Temperature Respirations Pulse Oximetry BMI (Body Mass Index)       74 97.3  F (36.3  C) (Temporal) 16 92% 35.67 kg/m2        Blood Pressure from Last 3 Encounters:   18 116/72   18 132/80   18 144/76    Weight from Last 3 Encounters:   18 195 lb (88.5 kg)   18 187 lb 6.4 oz (85 kg)   18 197 lb (89.4 kg)              Today, you had the following     No orders found for display       Primary Care Provider Office Phone # Fax #    Dorian Cade [phone]       [address] [facility] [address]        Goals        General    Transportation       Equal Access to Services     SUSIE MOLINA AH: Hadii aad ku hadasho Soomaali, waaxda luqadaha, qaybta kaalmada adeegyada, waxay idiin hayquianan felice lea laleonor . So [facility] [phone].    ATENCIÓN: Si habla español, tiene a hitchcock disposición servicios gratuitos de asistencia lingüística. Llame al [phone].    We comply with applicable federal civil rights laws and [place] laws. We do not discriminate on the basis of race, color, national origin, age, disability, sex, sexual orientation, or gender identity.            Thank you!     Thank you for choosing [facility]  for your care. Our goal is always to provide you with excellent care. Hearing back from our patients is one way we can continue to improve our services. Please take a few minutes to complete the written survey that you may receive in the  mail after your visit with us. Thank you!             Your Updated Medication List - Protect others around you: Learn how to safely use, store and throw away your medicines at www.disposemymeds.org.          This list is accurate as of 4/16/18 11:57 AM.  Always use your most recent med list.                   Brand Name Dispense Instructions for use Diagnosis    ALPRAZolam 0.5 MG tablet    XANAX    30 tablet    Take 1 tablet (0.5 mg) by mouth 3 times daily as needed for anxiety (To use no more then twice a week.)    Adjustment disorder with anxious mood, Chronic obstructive pulmonary disease, unspecified COPD type (H)       amLODIPine 5 MG tablet    NORVASC     Take 2.5 mg by mouth daily        aspirin 81 MG EC tablet     90 tablet    Take 1 tablet (81 mg) by mouth daily    Type 2 diabetes, HbA1C goal < 8% (H), Hypertension goal BP (blood pressure) < 140/90       atorvastatin 80 MG tablet    LIPITOR    30 tablet    Take 1 tablet (80 mg) by mouth daily    Type 2 diabetes mellitus with hyperglycemia, without long-term current use of insulin (H)       carvedilol 25 MG tablet    COREG     Take 25 mg by mouth 2 times daily (with meals)        clopidogrel 75 MG tablet    PLAVIX     Take 75 mg by mouth daily        FLUoxetine 20 MG capsule    PROzac    270 capsule    Take 3 capsules (60 mg) by mouth daily    Recurrent major depressive disorder, remission status unspecified (H)       fluticasone-salmeterol 100-50 MCG/DOSE diskus inhaler    ADVAIR    1 Inhaler    Inhale 1 puff into the lungs 2 times daily as needed        furosemide 40 MG tablet    LASIX     Take 40 mg by mouth daily        glyBURIDE-metFORMIN 5-500 MG per tablet    GLUCOVANCE    360 tablet    2 tablets in the AM and 2 at PM.    Type 2 diabetes mellitus with hyperglycemia, without long-term current use of insulin (H)       guaiFENesin 600 MG 12 hr tablet    MUCINEX     Take 1,200 mg by mouth 2 times daily        INCRUSE ELLIPTA 62.5 MCG/INH oral inhaler  "  Generic drug:  umeclidinium      Inhale 1 puff into the lungs daily        ipratropium - albuterol 0.5 mg/2.5 mg/3 mL 0.5-2.5 (3) MG/3ML neb solution    DUONEB    360 mL    NEBULIZE CONTENTS OF ONE VIAL EVERY 4 HOURS AS NEEDED FOR SHORTNESS OF BREATH / DYSPNEA OR WHEEZING    Chronic obstructive pulmonary disease with acute exacerbation (H), SOB (shortness of breath)       Isosorbide Mononitrate  MG Tb24      Take 120 mg by mouth 2 times daily        nitroGLYcerin 0.4 MG sublingual tablet    NITROSTAT    25 tablet    For chest pain place 1 tablet under the tongue every 5 minutes for 3 doses. If symptoms persist 5 minutes after 1st dose call 911.    Coronary artery disease involving native coronary artery of native heart, angina presence unspecified       nystatin 505001 UNIT/GM Powd    MYCOSTATIN    60 g    Use as needed to skin    Candidiasis of skin       order for DME     1 Device    2 wheel walker with rear ski glides Height:  5'4\" Weight:  173lbs    Chronic obstructive pulmonary disease, unspecified COPD type (H), Difficulty walking       predniSONE 20 MG tablet    DELTASONE     Take 20 mg by mouth daily        traZODone 50 MG tablet    DESYREL     Take  mg by mouth nightly as needed        VENTOLIN  (90 Base) MCG/ACT Inhaler   Generic drug:  albuterol     18 g    INHALE 2 PUFFS INTO THE LUNGS EVERY 4 HOURS AS NEEDED FOR SHORTNESS OF BREATH, DIFFICULTY BREATHING OR WHEEZING.    Chronic obstructive pulmonary disease, unspecified COPD type (H)         "

## 2018-04-16 NOTE — PATIENT INSTRUCTIONS
Recommendations from today's MTM visit:                                                    MTM (medication therapy management) is a service provided by a clinical pharmacist designed to help you get the most of out of your medicines.   Today we reviewed what your medicines are for, how to know if they are working, that your medicines are safe and how to make your medicine regimen as easy as possible.     1. Continue current medications.    Next MTM visit: As Needed    To schedule another MTM appointment, please call the clinic directly or you may call the MTM scheduling line at [phone] or toll-free at [phone].     My Clinical Pharmacist's contact information:                                                      It was a pleasure seeing you today!  Please feel free to contact me with any questions or concerns you have.      Brent Desai, Gracie, [facility]  Medication Therapy Management Pharmacist  Pager: [phone]    You may receive a survey about the MTM services you received.  I would appreciate your feedback to help me serve you better in the future. Please fill it out and return it when you can. Your comments will be anonymous.

## 2018-04-17 ASSESSMENT — PATIENT HEALTH QUESTIONNAIRE - PHQ9: SUM OF ALL RESPONSES TO PHQ QUESTIONS 1-9: 16

## 2018-04-20 ENCOUNTER — TELEPHONE (OUTPATIENT)
Dept: INTERNAL MEDICINE | Facility: CLINIC | Age: 53
End: 2018-04-20

## 2018-04-20 NOTE — TELEPHONE ENCOUNTER
Reason for Call: Request for an order or referral:    Order or referral being requested: order for Dietician needs to be extended from 4/23/18 to 4/30/18    Date needed: as soon as possible    Has the patient been seen by the PCP for this problem? YES    Additional comments: Carmina calling from [facility], the patient was not able to meet with the dietician, so need the order extended      Phone number Patient can be reached at:  Other phone number:  [phone]    Best Time:  any    Can we leave a detailed message on this number?  YES    Call taken on 4/20/2018 at 2:56 PM by Karen Sheridan

## 2018-04-20 NOTE — TELEPHONE ENCOUNTER
Reason for Call: Request for an order or referral:    Order or referral being requested: for Home Care    Date needed: as soon as possible    Has the patient been seen by the PCP for this problem? YES    Additional comments: Jeanine calling from [facility] needs order for nursing,  home health aide, also to continue Physical Therapy and Occupational Therapy    Phone number Patient can be reached at:  Other phone number:  [phone]     Best Time:  any    Can we leave a detailed message on this number?  YES    Call taken on 4/20/2018 at 3:21 PM by Karen Sheridan

## 2018-04-20 NOTE — TELEPHONE ENCOUNTER
I have notified Jeanine that the orders are okay. Mari Marie CMA ([facility])

## 2018-04-22 DIAGNOSIS — I25.10 CORONARY ARTERY DISEASE INVOLVING NATIVE CORONARY ARTERY OF NATIVE HEART, ANGINA PRESENCE UNSPECIFIED: Primary | ICD-10-CM

## 2018-04-23 RX ORDER — ISOSORBIDE MONONITRATE 120 MG/1
120 TABLET, EXTENDED RELEASE ORAL 2 TIMES DAILY
Qty: 180 TABLET | Refills: 0 | Status: SHIPPED | OUTPATIENT
Start: 2018-04-23 | End: 2018-08-08

## 2018-04-23 RX ORDER — CLOPIDOGREL BISULFATE 75 MG/1
75 TABLET ORAL DAILY
Qty: 90 TABLET | Refills: 0 | Status: SHIPPED | OUTPATIENT
Start: 2018-04-23 | End: 2018-05-30

## 2018-04-23 NOTE — TELEPHONE ENCOUNTER
"Requested Prescriptions   Pending Prescriptions Disp Refills     clopidogrel (PLAVIX) 75 MG tablet 90 tablet 0     Sig: Take 1 tablet (75 mg) by mouth daily    Plavix Failed    4/22/2018 10:11 AM       Failed - Normal HGB on file in past 12 months    Recent Labs   Lab Test  02/21/17   0741   HGB  13.1              Failed - Normal Platelets on file in past 12 months    Recent Labs   Lab Test  02/21/17   0741   PLT  192              Passed - No active PPI on record unless is Protonix       Passed - Recent (12 mo) or future (30 days) visit within the authorizing provider's specialty    Patient had office visit in the last 12 months or has a visit in the next 30 days with authorizing provider or within the authorizing provider's specialty.  See \"Patient Info\" tab in inbasket, or \"Choose Columns\" in Meds & Orders section of the refill encounter.           Passed - Patient is age 18 or older       Passed - No active pregnancy on record       Passed - No positive pregnancy test in past 12 months        Isosorbide Mononitrate  MG TB24 180 tablet 0     Sig: Take 1 tablet (120 mg) by mouth 2 times daily    There is no refill protocol information for this order            Plavix          Last Written Prescription Date:  ?  Last Fill Quantity: ?,   # refills: ?  Last Office Visit: 4/16/18  Future Office visit:    Next 5 appointments (look out 90 days)     May 29, 2018  1:00 PM CDT   Return Visit with BLANKA Rowell   [facility] ([address] [address] [phone]                   Routing refill request to provider for review/approval because:  Medication is reported/historical        isosorbide   Last Written Prescription Date:  ?  Last Fill Quantity: ?,   # refills: ?  Last Office Visit: 4/16/18  Future Office visit:    Next 5 appointments (look out 90 days)     May 29, 2018  1:00 PM CDT   Return Visit with Tavon Bro " BLANKA Ramirez   [facility] ([facility])    [address] [place] [address] [phone]                   Routing refill request to provider for review/approval because:  Medication is reported/historical

## 2018-04-29 DIAGNOSIS — I10 ESSENTIAL HYPERTENSION WITH GOAL BLOOD PRESSURE LESS THAN 140/90: Primary | ICD-10-CM

## 2018-04-30 RX ORDER — FUROSEMIDE 40 MG
40 TABLET ORAL DAILY
Qty: 30 TABLET | Refills: 1 | Status: SHIPPED | OUTPATIENT
Start: 2018-04-30 | End: 2018-06-25

## 2018-04-30 NOTE — TELEPHONE ENCOUNTER
"Lasix      Last Written Prescription Date:  Pt reported  Last Fill Quantity: ,   # refills:   Last Office Visit: 4/16/2018  Future Office visit:    Next 5 appointments (look out 90 days)     May 29, 2018  1:00 PM CDT   Return Visit with BLANKA Rowell   [facility] ([facility])    [address] [place] [address]   [phone]                   Routing refill request to provider for review/approval because:  Medication is reported/historical    Requested Prescriptions   Pending Prescriptions Disp Refills     furosemide (LASIX) 40 MG tablet 30 tablet      Sig: Take 1 tablet (40 mg) by mouth daily    Diuretics (Including Combos) Protocol Failed    4/29/2018 12:11 PM       Failed - Normal serum creatinine on file in past 12 months    Recent Labs   Lab Test 03/05/18   CR  1.18*             Passed - Blood pressure under 140/90 in past 12 months    BP Readings from Last 3 Encounters:   04/16/18 116/72   03/20/18 132/80   02/20/18 144/76                Passed - Recent (12 mo) or future (30 days) visit within the authorizing provider's specialty    Patient had office visit in the last 12 months or has a visit in the next 30 days with authorizing provider or within the authorizing provider's specialty.  See \"Patient Info\" tab in inbasket, or \"Choose Columns\" in Meds & Orders section of the refill encounter.           Passed - Patient is age 18 or older       Passed - No active pregancy on record       Passed - Normal serum potassium on file in past 12 months    Recent Labs   Lab Test 03/05/18   POTASSIUM  4.9                   Passed - Normal serum sodium on file in past 12 months    Recent Labs   Lab Test  02/20/18   1657   NA  134             Passed - No positive pregnancy test in past 12 months          "

## 2018-05-07 ENCOUNTER — TELEPHONE (OUTPATIENT)
Dept: INTERNAL MEDICINE | Facility: CLINIC | Age: 53
End: 2018-05-07

## 2018-05-07 ENCOUNTER — TRANSFERRED RECORDS (OUTPATIENT)
Dept: HEALTH INFORMATION MANAGEMENT | Facility: CLINIC | Age: 53
End: 2018-05-07

## 2018-05-08 ENCOUNTER — TRANSFERRED RECORDS (OUTPATIENT)
Dept: HEALTH INFORMATION MANAGEMENT | Facility: CLINIC | Age: 53
End: 2018-05-08

## 2018-05-31 ENCOUNTER — TELEPHONE (OUTPATIENT)
Dept: INTERNAL MEDICINE | Facility: CLINIC | Age: 53
End: 2018-05-31

## 2018-05-31 NOTE — TELEPHONE ENCOUNTER
Reason for Call: Request for an order or referral:    Order or referral being requested: patient is discharging from home care next week, and would like an order for palliative care, which is covered by her insurance please call and advise     Date needed: as soon as possible    Has the patient been seen by the PCP for this problem? YES    Additional comments: Cookie calling from home care, if questions on this you can call her. She is aware that provider is out of the office today    Phone number Patient can be reached at:  Other phone number:  [phone]    Best Time:  any    Can we leave a detailed message on this number?  YES    Call taken on 5/31/2018 at 2:43 PM by Karen Sheridan

## 2018-06-07 DIAGNOSIS — E11.65 TYPE 2 DIABETES MELLITUS WITH HYPERGLYCEMIA, WITHOUT LONG-TERM CURRENT USE OF INSULIN (H): Primary | ICD-10-CM

## 2018-06-07 NOTE — TELEPHONE ENCOUNTER
Reason for Call:  Other Patient Update    Detailed comments: Joann from [facility], she's a   & opened patient for case mgmt due to lung transplant will follow mutliple diagnoses, positive depression screen with a PHQ 9 of 4 & Joann states patient says she's feeling better.  Patient also informed Joann that she's out of testing strips and hadn't seen PCP for diabetic follow up , Joann did remind her to follow up with an appt.  Please call Joann if she can be of any help    Phone Number Patient can be reached at: Other phone number:  [phone]*    Best Time:     Can we leave a detailed message on this number? YES    Call taken on 6/7/2018 at 3:53 PM by Lorelei Asher

## 2018-06-08 NOTE — TELEPHONE ENCOUNTER
[facility], also requesting orders to be faxed over to [phone].  Cookie was advised PCP is not in clinic today  Thank you,  Lorelei Asher  Patient Representative

## 2018-06-13 ENCOUNTER — PATIENT OUTREACH (OUTPATIENT)
Dept: CARE COORDINATION | Facility: CLINIC | Age: 53
End: 2018-06-13

## 2018-06-13 DIAGNOSIS — G47.00 INSOMNIA, UNSPECIFIED TYPE: Primary | ICD-10-CM

## 2018-06-13 NOTE — PROGRESS NOTES
Clinic Care Coordination Contact  [facility]/Voicemail    Discharge from home care     Clinical Data: Care Coordinator Outreach  Outreach attempted x 1.  Left message on voicemail with call back information and requested return call.  Plan: Care Coordinator will try to reach patient again in 1-2 business days.      Elaina ODRAZ, RN, PHN  Care Coordination    [facility]  [address] [place] [address]  Office: [phone]  Fax [phone]   [facility]  [address] [place] [address]  Office: [phone] Fax [phone]  [email or web address]   [email or web address]   Connect with [email or web address] on social media.

## 2018-06-13 NOTE — TELEPHONE ENCOUNTER
Cookie calls back and states she still has not received the order and she is to start her cares tomorrow.  Please fax order asap to [phone].   Cookie states they received the notes but need the actual order.

## 2018-06-14 ENCOUNTER — TELEPHONE (OUTPATIENT)
Dept: INTERNAL MEDICINE | Facility: CLINIC | Age: 53
End: 2018-06-14

## 2018-06-14 NOTE — TELEPHONE ENCOUNTER
"Reason for Call: Request for an order or referral:    Order or referral being requested: for Palliative Care    Date needed: as soon as possible    Has the patient been seen by the PCP for this problem? YES    Additional comments: Ok calling from [facility],needs order stating: \"Ok for [facility] Palliative Care,\" and signed by Dr. Cade. Please fax to #[phone], Ok is aware that provider is out of the office today, call if Questions    Phone number Patient can be reached at:  Other phone number:  [phone]    Best Time: any    Can we leave a detailed message on this number?  YES    Call taken on 6/14/2018 at 11:12 AM by Karen Sheridan    "

## 2018-06-14 NOTE — TELEPHONE ENCOUNTER
Lm that we don't have an order.  Told her to fax us another one and I will have Dr. Cade sign it tomorrow.

## 2018-06-14 NOTE — LETTER
[address] [address]  Phone: [phone]    Harriett 15, 2018        Bernard Hughes  [address] [place] [address]          To whom it may concern:    RE: Bernard Hughes    Ok for [facility] Palliative Care     Diagnosis COPD.      Please contact me for questions or concerns.      Sincerely,        Dorian Cade MD

## 2018-06-18 NOTE — TELEPHONE ENCOUNTER
Ok called regarding status of letter. Please call. [phone]. Please fax letter to. [phone].

## 2018-06-19 RX ORDER — TRAZODONE HYDROCHLORIDE 50 MG/1
50-100 TABLET, FILM COATED ORAL
Qty: 90 TABLET | Refills: 0 | Status: SHIPPED | OUTPATIENT
Start: 2018-06-19 | End: 2018-01-01

## 2018-06-19 NOTE — PROGRESS NOTES
Clinic Care Coordination Contact    Pt notified of Rx      Elaina ORDAZ, RN, PHN  Care Coordination    [facility]  [address] [place] [address]  Office: [phone]  Fax [phone]   [facility]  [address] [place] [address]  Office: [phone] Fax [phone]  [email or web address]   [email or web address]   Connect with [facility] on social media.

## 2018-06-19 NOTE — PROGRESS NOTES
Clinic Care Coordination Contact    Clinic Care Coordination Contact  OUTREACH    Referral Information:  Referral Source: [facility]    Primary Diagnosis: COPD    Chief Complaint   Patient presents with     Clinic Care Coordination - Follow-up     RN follow up      Universal Utilization: no concerns  Clinic Utilization  Difficulty keeping appointments:: Yes  Utilization    Last refreshed: 6/18/2018  5:04 PM:  No Show Count (past year) 5       Last refreshed: 6/18/2018  5:04 PM:  ED visits 0       Last refreshed: 6/18/2018  5:04 PM:  Hospital admissions 0          Current as of: 6/18/2018  5:04 PM           Clinical Concerns:  Current Medical Concerns:  Called and spoke with pt, she states she is doing ok.  States she recently has signed up for in home Palliative Care to help her better manage her COPD as it progresses.  States they will be coming to her home which will make it easier for her.  Pt has been in regular home care but feels she needs to take the next step into Palliative Care. Pt does have a follow up appt scheduled with Dr. Cade on 7/10 and is wondering if she can get a prescription for her trazodone.  States she originally was prescribed this during a hospitalization and has not been prescribed by Dr. Cade before.    Current Behavioral Concerns: no current concerns    Education Provided to patient: na      Health Maintenance Reviewed: Not assessed  Clinical Pathway: None    Medication Management:  Self management      Functional Status:  Mobility Status: Independent w/Device     Transportation:  Transportation means:: Regular car, Family     Psychosocial:  Mental health DX:: Yes  Mental health DX how managed:: Medication  Mental health management concern (GOAL):: No  Informal Support system:: Significant other, Family     Financial/Insurance:   Financial/Insurance concerns (GOAL):: No     Resources and Interventions:  Current Resources: Palliative Care     Supplies used at home::  Diabetic Supplies  Equipment Currently Used at Home: cane, straight, oxygen, shower chair, walker, rolling, walker, standard, other (see comments)     Goals:   Goals        General    Transportation         Patient/Caregiver understanding: Pt verbalizes understanding of the importance of follow up with PCP    Outreach Frequency: monthly  Future Appointments              In 3 weeks Dorian Cade MD [facility]        Plan:   Pt will continue to work with Palliative Home Care.   RN will outreach in 2-4 weeks.     Elaina BAILEYN, RN, PHN  Care Coordination    [id number] [place] [address]  Office: [phone]  Fax [phone]   [address] [address]  Office: [phone] Fax [phone]  [email or web address]   [email or web address]   Connect with [facility] on social media.

## 2018-06-19 NOTE — PROGRESS NOTES
Clinic Care Coordination Contact  Care Team Conversations    Pt is requesting a prescription for her Trazodone. RX pended under orders tab.       Elaina ORDAZ, RN, PHN  Care Coordination    [address] [place] [address]  Office: [phone]  Fax [phone]   [facility]  [address] [place] [address]  Office: [phone] Fax [phone]  [email or web address]   [email or web address]   Connect with [facility] on social media.  ]

## 2018-06-25 DIAGNOSIS — I10 ESSENTIAL HYPERTENSION WITH GOAL BLOOD PRESSURE LESS THAN 140/90: ICD-10-CM

## 2018-06-25 NOTE — TELEPHONE ENCOUNTER
"Requested Prescriptions   Pending Prescriptions Disp Refills     furosemide (LASIX) 40 MG tablet 30 tablet 1     Sig: Take 1 tablet (40 mg) by mouth daily    Diuretics (Including Combos) Protocol Failed    6/25/2018  2:45 PM       Failed - Normal serum creatinine on file in past 12 months    Recent Labs   Lab Test 03/05/18   CR  1.18*             Passed - Blood pressure under 140/90 in past 12 months    BP Readings from Last 3 Encounters:   04/16/18 116/72   03/20/18 132/80   02/20/18 144/76                Passed - Recent (12 mo) or future (30 days) visit within the authorizing provider's specialty    Patient had office visit in the last 12 months or has a visit in the next 30 days with authorizing provider or within the authorizing provider's specialty.  See \"Patient Info\" tab in inbasket, or \"Choose Columns\" in Meds & Orders section of the refill encounter.           Passed - Patient is age 18 or older       Passed - No active pregancy on record       Passed - Normal serum potassium on file in past 12 months    Recent Labs   Lab Test 03/05/18   POTASSIUM  4.9                   Passed - Normal serum sodium on file in past 12 months    Recent Labs   Lab Test  02/20/18   1657   NA  134             Passed - No positive pregnancy test in past 12 months          Last Written Prescription Date:  4/30/18  Last Fill Quantity: 30,  # refills: 1   Last Office Visit with [facility], [facility] or [facility] prescribing provider:  4/16/18   Future Office Visit:    Next 5 appointments (look out 90 days)     Jul 10, 2018  7:30 AM CDT   [facility]    [address] [place] [address] [phone]                   "

## 2018-06-27 ENCOUNTER — TELEPHONE (OUTPATIENT)
Dept: TRANSPLANT | Facility: CLINIC | Age: 53
End: 2018-06-27

## 2018-06-27 NOTE — TELEPHONE ENCOUNTER
Provider Call: General    Reason for call: Joann  from  [facility] where in the process is pt for evaluation-     Call back needed? Yes    Return Call Needed  Same as documented in contacts section  When to return call?: Same day: [facility]

## 2018-06-28 NOTE — TELEPHONE ENCOUNTER
Returned call to KAYE David . Joann was unavailable at the time of call. Left brief voicemail updating her on patient's transplant referral. Patient BMI 36.3. Relayed patient could be seen by transplant pulmonologist to discuss options of transplant once her BMI was 35 of less. Patient would need to have BMI of 30 or less to move forward with lung transplant evaluation. Plan made with patient to follow up in 2-3 months to assess weight loss progress. Patient referral placed into medical management. Will close referral if no weight loss progress occurs.

## 2018-06-29 RX ORDER — FUROSEMIDE 40 MG
40 TABLET ORAL DAILY
Qty: 30 TABLET | Refills: 1 | Status: SHIPPED | OUTPATIENT
Start: 2018-06-29 | End: 2018-01-01

## 2018-07-25 ENCOUNTER — ALLIED HEALTH/NURSE VISIT (OUTPATIENT)
Dept: PHARMACY | Facility: CLINIC | Age: 53
End: 2018-07-25
Payer: COMMERCIAL

## 2018-07-25 DIAGNOSIS — F43.23 ADJUSTMENT DISORDER WITH MIXED ANXIETY AND DEPRESSED MOOD: Primary | ICD-10-CM

## 2018-07-25 DIAGNOSIS — G47.00 INSOMNIA, UNSPECIFIED TYPE: ICD-10-CM

## 2018-07-25 PROCEDURE — 99606 MTMS BY PHARM EST 15 MIN: CPT | Mod: U4 | Performed by: PHARMACIST

## 2018-07-25 NOTE — MR AVS SNAPSHOT
After Visit Summary   7/25/2018    Bernard Hughes    MRN: [id number]           Patient Information     Date Of Birth          1965        Visit Information        Provider Department      7/25/2018 8:30 AM [facility]        Today's Diagnoses     Adjustment disorder with mixed anxiety and depressed mood    -  1    Insomnia, unspecified type          Care Instructions    Continue current medications.           Follow-ups after your visit        Your next 10 appointments already scheduled     Aug 14, 2018  7:30 AM CDT   [facility]    [address] [place] [address] [phone]           Bring a current list of meds and any records pertaining to this visit. For Physicals, please bring immunization records and any forms needing to be filled out. Please arrive 10 minutes early to complete paperwork.              Who to contact     If you have questions or need follow up information about today's clinic visit or your schedule please contact [facility] directly at [phone].  Normal or non-critical lab and imaging results will be communicated to you by HELM Bootshart, letter or phone within 4 business days after the clinic has received the results. If you do not hear from us within 7 days, please contact the clinic through Hookfl[employer] or phone. If you have a critical or abnormal lab result, we will notify you by phone as soon as possible.  Submit refill requests through im3D or call your pharmacy and they will forward the refill request to us. Please allow 3 business days for your refill to be completed.          Additional Information About Your Visit        MyChart Information     im3D lets you send messages to your doctor, view your test results, renew your prescriptions, schedule appointments and more. To sign up, go to  [email or web address] . Click on \"Log in\" on the left side of the screen, which will take you to the Welcome page. Then click on \"Sign up Now\" on the right side of the page.     You will be asked to enter the access code listed below, as well as some personal information. Please follow the directions to create your username and password.     Your access code is: [email or web address]  Expires: 10/23/2018  4:42 PM     Your access code will  in 90 days. If you need help or a new code, please call your [place] clinic or [phone].        Care EveryWhere ID     This is your Care EveryWhere ID. This could be used by other organizations to access your [place] medical records  [id number]         Blood Pressure from Last 3 Encounters:   18 116/72   18 132/80   18 144/76    Weight from Last 3 Encounters:   18 195 lb (88.5 kg)   18 187 lb 6.4 oz (85 kg)   18 197 lb (89.4 kg)              Today, you had the following     No orders found for display       Primary Care Provider Office Phone # Fax #    Dorian Cade [phone]        [facility] [address]        Goals        General    Transportation       Equal Access to Services     RUBI MOLINA AH: Hadii aad ku hadasho Sobeeali, waaxda luqadaha, qaybta kaalmada adeegyada, hermes chiang. So [facility] [phone].    ATENCIÓN: Si habla español, tiene a hitchcock disposición servicios gratuitos de asistencia lingüística. Mónica al [phone].    We comply with applicable federal civil rights laws and [place] laws. We do not discriminate on the basis of race, color, national origin, age, disability, sex, sexual orientation, or gender identity.            Thank you!     Thank you for choosing [facility]  for your care. Our goal is always to provide you with excellent care. Hearing back from our patients is one way we can continue to improve our services. Please take a " few minutes to complete the written survey that you may receive in the mail after your visit with us. Thank you!             Your Updated Medication List - Protect others around you: Learn how to safely use, store and throw away your medicines at www.disposemymeds.org.          This list is accurate as of 7/25/18  4:42 PM.  Always use your most recent med list.                   Brand Name Dispense Instructions for use Diagnosis    ALPRAZolam 0.5 MG tablet    XANAX    30 tablet    Take 1 tablet (0.5 mg) by mouth 3 times daily as needed for anxiety (To use no more then twice a week.)    Adjustment disorder with anxious mood, Chronic obstructive pulmonary disease, unspecified COPD type (H)       amLODIPine 5 MG tablet    NORVASC    45 tablet    Take 0.5 tablets (2.5 mg) by mouth daily    Essential hypertension with goal blood pressure less than 140/90       aspirin 81 MG EC tablet     90 tablet    Take 1 tablet (81 mg) by mouth daily    Type 2 diabetes, HbA1C goal < 8% (H), Hypertension goal BP (blood pressure) < 140/90       atorvastatin 80 MG tablet    LIPITOR    30 tablet    Take 1 tablet (80 mg) by mouth daily    Type 2 diabetes mellitus with hyperglycemia, without long-term current use of insulin (H)       blood glucose monitoring test strip    no brand specified    100 strip    Use to test blood sugars one times daily or as directed    Type 2 diabetes mellitus with hyperglycemia, without long-term current use of insulin (H)       carvedilol 25 MG tablet    COREG    180 tablet    Take 1 tablet (25 mg) by mouth 2 times daily (with meals)    Essential hypertension with goal blood pressure less than 140/90       FLUoxetine 20 MG capsule    PROzac    360 capsule    Take 4 capsules (80 mg) by mouth daily    Recurrent major depressive disorder, remission status unspecified (H)       fluticasone-salmeterol 100-50 MCG/DOSE diskus inhaler    ADVAIR    1 Inhaler    Inhale 1 puff into the lungs 2 times daily as needed      "   furosemide 40 MG tablet    LASIX    30 tablet    Take 1 tablet (40 mg) by mouth daily    Essential hypertension with goal blood pressure less than 140/90       glyBURIDE-metFORMIN 5-500 MG per tablet    GLUCOVANCE    360 tablet    2 tablets in the AM and 2 at PM.    Type 2 diabetes mellitus with hyperglycemia, without long-term current use of insulin (H)       guaiFENesin 600 MG 12 hr tablet    MUCINEX    60 tablet    Take 1 tablet (600 mg) by mouth 2 times daily    Cough       INCRUSE ELLIPTA 62.5 MCG/INH oral inhaler   Generic drug:  umeclidinium      Inhale 1 puff into the lungs daily        ipratropium - albuterol 0.5 mg/2.5 mg/3 mL 0.5-2.5 (3) MG/3ML neb solution    DUONEB    360 mL    NEBULIZE CONTENTS OF ONE VIAL EVERY 4 HOURS AS NEEDED FOR SHORTNESS OF BREATH / DYSPNEA OR WHEEZING    Chronic obstructive pulmonary disease with acute exacerbation (H), SOB (shortness of breath)       Isosorbide Mononitrate  MG Tb24     180 tablet    Take 1 tablet (120 mg) by mouth 2 times daily    Coronary artery disease involving native coronary artery of native heart, angina presence unspecified       nitroGLYcerin 0.4 MG sublingual tablet    NITROSTAT    25 tablet    For chest pain place 1 tablet under the tongue every 5 minutes for 3 doses. If symptoms persist 5 minutes after 1st dose call 911.    Coronary artery disease involving native coronary artery of native heart, angina presence unspecified       nystatin [id number] UNIT/GM Powd    MYCOSTATIN    60 g    Use as needed to skin    Candidiasis of skin       order for DME     1 Device    2 wheel walker with rear ski glides Height:  5'4\" Weight:  173lbs    Chronic obstructive pulmonary disease, unspecified COPD type (H), Difficulty walking       traZODone 50 MG tablet    DESYREL    90 tablet    Take 1-2 tablets ( mg) by mouth nightly as needed    Insomnia, unspecified type       VENTOLIN  (90 Base) MCG/ACT Inhaler   Generic drug:  albuterol     18 g    " INHALE 2 PUFFS INTO THE LUNGS EVERY 4 HOURS AS NEEDED FOR SHORTNESS OF BREATH, DIFFICULTY BREATHING OR WHEEZING.    Chronic obstructive pulmonary disease, unspecified COPD type (H)

## 2018-07-25 NOTE — PROGRESS NOTES
SUBJECTIVE/OBJECTIVE:                Bernard Hughes is a 52 year old female called for a follow-up visit for Medication Therapy Management.  She was referred to me from her [employer] insurance plan.     Chief Complaint: Follow up from our visit on 4/13/18.  Anxiety Follow-Up  Tobacco: History of tobacco dependence - quit 2+ years ago   Alcohol: none    Medication Adherence/Access:  no issues reported    Anxiety: Takes fluoxetine 80 mg daily.  She feels that since PCP increased this dose she has been doing better.  Feels that her breathing has been better as well/which may be tied to anxiety. Has been on this dose for ~6 month. Takes alprazolam 0.5 mg three times daily as needed. Denies side effects.     Insomnia: Was started on trazodone  mg daily at night as needed. Feels this has been helping.     Today's Vitals: There were no vitals taken for this visit. - telephone encounter, no vitals      ASSESSMENT:              Current medications were reviewed today as discussed above.      Medication Adherence: good, no issues identified    Anxiety: Improved per patient.    Insomnia: Stable.     PLAN:                  1. Continue current medications    I spent 15 minutes with this patient today. A copy of the visit note was provided to the patient's primary care provider.     Will follow up in 3-6 months or sooner if needed.    The patient declined a summary of these recommendations as an after visit summary.    Brent Desai, RupaD, [facility]  Medication Therapy Management Pharmacist  Pager: [phone]

## 2018-07-30 DIAGNOSIS — E11.65 TYPE 2 DIABETES MELLITUS WITH HYPERGLYCEMIA, WITHOUT LONG-TERM CURRENT USE OF INSULIN (H): ICD-10-CM

## 2018-07-30 NOTE — TELEPHONE ENCOUNTER
"Requested Prescriptions   Pending Prescriptions Disp Refills     atorvastatin (LIPITOR) 80 MG tablet 30 tablet 3    Last Written Prescription Date:  3/2/18  Last Fill Quantity: 30,  # refills: 3   Last office visit: 11/6/2017 with prescribing provider:  4/16/18   Future Office Visit:   Next 5 appointments (look out 90 days)     Aug 14, 2018  7:30 AM CDT   [facility]    [address] [place] [id number] [phone]                  Sig: Take 1 tablet (80 mg) by mouth daily    Statins Protocol Failed    7/30/2018 11:32 AM       Failed - LDL on file in past 12 months    Recent Labs   Lab Test  07/26/17   0830   LDL  80            Passed - No abnormal creatine kinase in past 12 months    No lab results found.            Passed - Recent (12 mo) or future (30 days) visit within the authorizing provider's specialty    Patient had office visit in the last 12 months or has a visit in the next 30 days with authorizing provider or within the authorizing provider's specialty.  See \"Patient Info\" tab in inbasket, or \"Choose Columns\" in Meds & Orders section of the refill encounter.           Passed - Patient is age 18 or older       Passed - No active pregnancy on record       Passed - No positive pregnancy test in past 12 months          "

## 2018-08-01 RX ORDER — ATORVASTATIN CALCIUM 80 MG/1
80 TABLET, FILM COATED ORAL DAILY
Qty: 30 TABLET | Refills: 0 | Status: SHIPPED | OUTPATIENT
Start: 2018-08-01 | End: 2018-08-14

## 2018-08-01 NOTE — TELEPHONE ENCOUNTER
Prescription approved per [facility] Refill Protocol.  Aleah Hughes RN

## 2018-08-08 DIAGNOSIS — I25.10 CORONARY ARTERY DISEASE INVOLVING NATIVE CORONARY ARTERY OF NATIVE HEART, ANGINA PRESENCE UNSPECIFIED: ICD-10-CM

## 2018-08-08 NOTE — TELEPHONE ENCOUNTER
"Requested Prescriptions   Pending Prescriptions Disp Refills     Isosorbide Mononitrate  MG TB24 [Pharmacy Med Name: ISOSORBIDE MONO 120MG CR TAB] 180 tablet 0    Last Written Prescription Date:  4/23/18  Last Fill Quantity: 180,  # refills: 0   Last office visit: 11/6/2017 with prescribing provider:     Future Office Visit: 4/16/18  Next 5 appointments (look out 90 days)     Aug 14, 2018  7:30 AM CDT   Office Visit with Dorian Cade MD   [facility] ([address] [id number] [phone]                  Sig: TAKE ONE TABLET BY MOUTH TWICE A DAY    Nitrates Passed    8/8/2018 11:06 AM       Passed - Blood pressure under 140/90 in past 12 months    BP Readings from Last 3 Encounters:   04/16/18 116/72   03/20/18 132/80   02/20/18 144/76                Passed - Pt is not on erectile dysfunction medications       Passed - Recent (12 mo) or future (30 days) visit within the authorizing provider's specialty    Patient had office visit in the last 12 months or has a visit in the next 30 days with authorizing provider or within the authorizing provider's specialty.  See \"Patient Info\" tab in inbasket, or \"Choose Columns\" in Meds & Orders section of the refill encounter.           Passed - Patient is age 18 or older          "

## 2018-08-09 RX ORDER — ISOSORBIDE MONONITRATE 120 MG/1
TABLET, EXTENDED RELEASE ORAL
Qty: 180 TABLET | Refills: 0 | Status: SHIPPED | OUTPATIENT
Start: 2018-08-09 | End: 2018-08-14

## 2018-08-14 ENCOUNTER — TELEPHONE (OUTPATIENT)
Dept: INTERNAL MEDICINE | Facility: CLINIC | Age: 53
End: 2018-08-14

## 2018-08-14 ENCOUNTER — TELEPHONE (OUTPATIENT)
Dept: FAMILY MEDICINE | Facility: CLINIC | Age: 53
End: 2018-08-14

## 2018-08-14 ENCOUNTER — OFFICE VISIT (OUTPATIENT)
Dept: INTERNAL MEDICINE | Facility: CLINIC | Age: 53
End: 2018-08-14
Payer: COMMERCIAL

## 2018-08-14 VITALS
RESPIRATION RATE: 16 BRPM | DIASTOLIC BLOOD PRESSURE: 64 MMHG | SYSTOLIC BLOOD PRESSURE: 138 MMHG | HEART RATE: 70 BPM | BODY MASS INDEX: 38.59 KG/M2 | TEMPERATURE: 98.3 F | OXYGEN SATURATION: 92 % | WEIGHT: 211 LBS

## 2018-08-14 DIAGNOSIS — I10 ESSENTIAL HYPERTENSION WITH GOAL BLOOD PRESSURE LESS THAN 140/90: ICD-10-CM

## 2018-08-14 DIAGNOSIS — E66.01 MORBID OBESITY (H): ICD-10-CM

## 2018-08-14 DIAGNOSIS — R79.89 ELEVATED LIVER FUNCTION TESTS: Primary | ICD-10-CM

## 2018-08-14 DIAGNOSIS — F33.9 RECURRENT MAJOR DEPRESSIVE DISORDER, REMISSION STATUS UNSPECIFIED (H): ICD-10-CM

## 2018-08-14 DIAGNOSIS — J44.9 CHRONIC OBSTRUCTIVE PULMONARY DISEASE, UNSPECIFIED COPD TYPE (H): ICD-10-CM

## 2018-08-14 DIAGNOSIS — I25.10 CORONARY ARTERY DISEASE INVOLVING NATIVE CORONARY ARTERY OF NATIVE HEART, ANGINA PRESENCE UNSPECIFIED: ICD-10-CM

## 2018-08-14 DIAGNOSIS — E11.65 TYPE 2 DIABETES MELLITUS WITH HYPERGLYCEMIA, WITHOUT LONG-TERM CURRENT USE OF INSULIN (H): Primary | ICD-10-CM

## 2018-08-14 LAB
ALBUMIN SERPL-MCNC: 3.1 G/DL (ref 3.4–5)
ALP SERPL-CCNC: 512 U/L (ref 40–150)
ALT SERPL W P-5'-P-CCNC: 126 U/L (ref 0–50)
ANION GAP SERPL CALCULATED.3IONS-SCNC: 7 MMOL/L (ref 3–14)
AST SERPL W P-5'-P-CCNC: 76 U/L (ref 0–45)
BILIRUB SERPL-MCNC: 0.4 MG/DL (ref 0.2–1.3)
BUN SERPL-MCNC: 33 MG/DL (ref 7–30)
CALCIUM SERPL-MCNC: 9 MG/DL (ref 8.5–10.1)
CHLORIDE SERPL-SCNC: 100 MMOL/L (ref 94–109)
CHOLEST SERPL-MCNC: 154 MG/DL
CO2 SERPL-SCNC: 31 MMOL/L (ref 20–32)
CREAT SERPL-MCNC: 1.69 MG/DL (ref 0.52–1.04)
ERYTHROCYTE [DISTWIDTH] IN BLOOD BY AUTOMATED COUNT: 15.4 % (ref 10–15)
GFR SERPL CREATININE-BSD FRML MDRD: 32 ML/MIN/1.7M2
GLUCOSE SERPL-MCNC: 200 MG/DL (ref 70–99)
HBA1C MFR BLD: 6.8 % (ref 0–5.6)
HCT VFR BLD AUTO: 32.4 % (ref 35–47)
HDLC SERPL-MCNC: 49 MG/DL
HGB BLD-MCNC: 9.6 G/DL (ref 11.7–15.7)
LDLC SERPL CALC-MCNC: 86 MG/DL
MCH RBC QN AUTO: 27.3 PG (ref 26.5–33)
MCHC RBC AUTO-ENTMCNC: 29.6 G/DL (ref 31.5–36.5)
MCV RBC AUTO: 92 FL (ref 78–100)
NONHDLC SERPL-MCNC: 105 MG/DL
PLATELET # BLD AUTO: 180 10E9/L (ref 150–450)
POTASSIUM SERPL-SCNC: 4.5 MMOL/L (ref 3.4–5.3)
PROT SERPL-MCNC: 7.3 G/DL (ref 6.8–8.8)
RBC # BLD AUTO: 3.52 10E12/L (ref 3.8–5.2)
SODIUM SERPL-SCNC: 138 MMOL/L (ref 133–144)
TRIGL SERPL-MCNC: 97 MG/DL
WBC # BLD AUTO: 4.2 10E9/L (ref 4–11)

## 2018-08-14 PROCEDURE — 83036 HEMOGLOBIN GLYCOSYLATED A1C: CPT | Performed by: INTERNAL MEDICINE

## 2018-08-14 PROCEDURE — 36415 COLL VENOUS BLD VENIPUNCTURE: CPT | Performed by: INTERNAL MEDICINE

## 2018-08-14 PROCEDURE — 85027 COMPLETE CBC AUTOMATED: CPT | Performed by: INTERNAL MEDICINE

## 2018-08-14 PROCEDURE — 99214 OFFICE O/P EST MOD 30 MIN: CPT | Performed by: INTERNAL MEDICINE

## 2018-08-14 PROCEDURE — 80061 LIPID PANEL: CPT | Performed by: INTERNAL MEDICINE

## 2018-08-14 PROCEDURE — 80053 COMPREHEN METABOLIC PANEL: CPT | Performed by: INTERNAL MEDICINE

## 2018-08-14 RX ORDER — ATORVASTATIN CALCIUM 80 MG/1
80 TABLET, FILM COATED ORAL DAILY
Qty: 90 TABLET | Refills: 3 | Status: SHIPPED | OUTPATIENT
Start: 2018-08-14 | End: 2019-01-01 | Stop reason: DRUGHIGH

## 2018-08-14 RX ORDER — GLYBURIDE 5 MG/1
5 TABLET ORAL
Qty: 90 TABLET | Refills: 1 | Status: SHIPPED | OUTPATIENT
Start: 2018-08-14 | End: 2018-09-04

## 2018-08-14 RX ORDER — ISOSORBIDE MONONITRATE 120 MG/1
120 TABLET, EXTENDED RELEASE ORAL 2 TIMES DAILY
Qty: 180 TABLET | Refills: 3 | Status: SHIPPED | OUTPATIENT
Start: 2018-08-14

## 2018-08-14 ASSESSMENT — PAIN SCALES - GENERAL: PAINLEVEL: NO PAIN (0)

## 2018-08-14 NOTE — TELEPHONE ENCOUNTER
----- Message from Dorian Cade MD sent at 8/14/2018 10:33 AM CDT -----  Here labs are ok for anemia that wasn't seen a year ago. Need recheck hgb in 1-2 weeks.  Liver tests are up, probably from weight gain, I would like an abd ultrasound and recheck comprehensive panel in 1-2 weeks.  Diabetes was good.

## 2018-08-14 NOTE — MR AVS SNAPSHOT
"              After Visit Summary   8/14/2018    Bernard Hughes    MRN: [id number]           Patient Information     Date Of Birth          1965        Visit Information        Provider Department      8/14/2018 7:30 AM Dorian Cade MD [facility]         Follow-ups after your visit        Your next 10 appointments already scheduled     Aug 14, 2018  7:30 AM CDT   Office Visit with [facility]ian Cade MD   [facility]    [address] [place] [id number]   [phone]           Bring a current list of meds and any records pertaining to this visit. For Physicals, please bring immunization records and any forms needing to be filled out. Please arrive 10 minutes early to complete paperwork.              Who to contact     If you have questions or need follow up information about today's clinic visit or your schedule please contact [facility] directly at [phone].  Normal or non-critical lab and imaging results will be communicated to you by MyChart, letter or phone within 4 business days after the clinic has received the results. If you do not hear from us within 7 days, please contact the clinic through [facility] or phone. If you have a critical or abnormal lab result, we will notify you by phone as soon as possible.  Submit refill requests through [facility] or call your pharmacy and they will forward the refill request to us. Please allow 3 business days for your refill to be completed.          Additional Information About Your Visit        [employer] Information     [employer] lets you send messages to your doctor, view your test results, renew your prescriptions, schedule appointments and more. To sign up, go to [email or web address] . Click on \"Log in\" on the left side of the screen, which will take you to the Welcome page. Then click on \"Sign up Now\" on the right side of the page.     You will be asked to enter the access " code listed below, as well as some personal information. Please follow the directions to create your username and password.     Your access code is: [email or web address]  Expires: 10/23/2018  4:42 PM     Your access code will  in 90 days. If you need help or a new code, please call your [facility] clinic or [phone].        Care EveryWhere ID     This is your Care EveryWhere ID. This could be used by other organizations to access your [facility] medical records  [id number]        Your Vitals Were     Pulse Temperature Respirations Pulse Oximetry BMI (Body Mass Index)       70 98.3  F (36.8  C) (Temporal) 16 92% 38.59 kg/m2        Blood Pressure from Last 3 Encounters:   18 138/64   18 116/72   18 132/80    Weight from Last 3 Encounters:   18 211 lb (95.7 kg)   18 195 lb (88.5 kg)   18 187 lb 6.4 oz (85 kg)              Today, you had the following     No orders found for display       Primary Care Provider Office Phone # Fax #    Dorian Cade [phone]       [address] [facility] [address]        Goals        General    Transportation       Equal Access to Services     SUSIE MOLINA AH: Hadii aad ku hadasho Soomaali, waaxda luqadaha, qaybta kaalmada adeegyada, waxay idiin hayquianan felice lea laleonor . So [facility] [phone].    ATENCIÓN: Si habla español, tiene a hitchcock disposición servicios gratuitos de asistencia lingüística. ame al [phone].    We comply with applicable federal civil rights laws and [place] laws. We do not discriminate on the basis of race, color, national origin, age, disability, sex, sexual orientation, or gender identity.            Thank you!     Thank you for choosing [facility]  for your care. Our goal is always to provide you with excellent care. Hearing back from our patients is one way we can continue to improve our services. Please take a few minutes to complete the written survey that you may receive in the  mail after your visit with us. Thank you!             Your Updated Medication List - Protect others around you: Learn how to safely use, store and throw away your medicines at www.disposemymeds.org.          This list is accurate as of 8/14/18  7:26 AM.  Always use your most recent med list.                   Brand Name Dispense Instructions for use Diagnosis    ALPRAZolam 0.5 MG tablet    XANAX    30 tablet    Take 1 tablet (0.5 mg) by mouth 3 times daily as needed for anxiety (To use no more then twice a week.)    Adjustment disorder with anxious mood, Chronic obstructive pulmonary disease, unspecified COPD type (H)       amLODIPine 5 MG tablet    NORVASC    45 tablet    Take 0.5 tablets (2.5 mg) by mouth daily    Essential hypertension with goal blood pressure less than 140/90       aspirin 81 MG EC tablet     90 tablet    Take 1 tablet (81 mg) by mouth daily    Type 2 diabetes, HbA1C goal < 8% (H), Hypertension goal BP (blood pressure) < 140/90       atorvastatin 80 MG tablet    LIPITOR    30 tablet    Take 1 tablet (80 mg) by mouth daily    Type 2 diabetes mellitus with hyperglycemia, without long-term current use of insulin (H)       blood glucose monitoring test strip    no brand specified    100 strip    Use to test blood sugars one times daily or as directed    Type 2 diabetes mellitus with hyperglycemia, without long-term current use of insulin (H)       carvedilol 25 MG tablet    COREG    180 tablet    Take 1 tablet (25 mg) by mouth 2 times daily (with meals)    Essential hypertension with goal blood pressure less than 140/90       FLUoxetine 20 MG capsule    PROzac    360 capsule    Take 4 capsules (80 mg) by mouth daily    Recurrent major depressive disorder, remission status unspecified (H)       fluticasone-salmeterol 100-50 MCG/DOSE diskus inhaler    ADVAIR    1 Inhaler    Inhale 1 puff into the lungs 2 times daily as needed        furosemide 40 MG tablet    LASIX    30 tablet    Take 1 tablet (40  "mg) by mouth daily    Essential hypertension with goal blood pressure less than 140/90       glyBURIDE-metFORMIN 5-500 MG per tablet    GLUCOVANCE    360 tablet    2 tablets in the AM and 2 at PM.    Type 2 diabetes mellitus with hyperglycemia, without long-term current use of insulin (H)       guaiFENesin 600 MG 12 hr tablet    MUCINEX    60 tablet    Take 1 tablet (600 mg) by mouth 2 times daily    Cough       INCRUSE ELLIPTA 62.5 MCG/INH inhaler   Generic drug:  umeclidinium      Inhale 1 puff into the lungs daily        ipratropium - albuterol 0.5 mg/2.5 mg/3 mL 0.5-2.5 (3) MG/3ML neb solution    DUONEB    360 mL    NEBULIZE CONTENTS OF ONE VIAL EVERY 4 HOURS AS NEEDED FOR SHORTNESS OF BREATH / DYSPNEA OR WHEEZING    Chronic obstructive pulmonary disease with acute exacerbation (H), SOB (shortness of breath)       Isosorbide Mononitrate  MG Tb24     180 tablet    TAKE ONE TABLET BY MOUTH TWICE A DAY    Coronary artery disease involving native coronary artery of native heart, angina presence unspecified       nitroGLYcerin 0.4 MG sublingual tablet    NITROSTAT    25 tablet    For chest pain place 1 tablet under the tongue every 5 minutes for 3 doses. If symptoms persist 5 minutes after 1st dose call 911.    Coronary artery disease involving native coronary artery of native heart, angina presence unspecified       nystatin 168970 UNIT/GM Powd    MYCOSTATIN    60 g    Use as needed to skin    Candidiasis of skin       order for DME     1 Device    2 wheel walker with rear ski glides Height:  5'4\" Weight:  173lbs    Chronic obstructive pulmonary disease, unspecified COPD type (H), Difficulty walking       traZODone 50 MG tablet    DESYREL    90 tablet    Take 1-2 tablets ( mg) by mouth nightly as needed    Insomnia, unspecified type       VENTOLIN  (90 Base) MCG/ACT inhaler   Generic drug:  albuterol     18 g    INHALE 2 PUFFS INTO THE LUNGS EVERY 4 HOURS AS NEEDED FOR SHORTNESS OF BREATH, " DIFFICULTY BREATHING OR WHEEZING.    Chronic obstructive pulmonary disease, unspecified COPD type (H)

## 2018-08-14 NOTE — TELEPHONE ENCOUNTER
Pt was advised of the results and recommendations from Dr. Cade, pt verbalized understanding. Lab orders placed. Flagged TC to set the [place]

## 2018-08-14 NOTE — TELEPHONE ENCOUNTER
Insurance is stating a PA is needed for Glyburide: states age restriction for this medication.     Prior Authorization Retail Medication Request    Medication/Dose: Glyburide needs PA  ICD code (if different than what is on RX):    Previously Tried and Failed:  Glyburide-Metformin combination pill  Rationale:  Weight gain    Insurance Name:  [employer]  Insurance ID:  [id number]      Thanks  Mayi Winn [facility]   [phone]

## 2018-08-14 NOTE — PROGRESS NOTES
SUBJECTIVE:   Bernard Hughes is a 53 year old female who presents to clinic today for the following health issues:      Chief Complaint   Patient presents with     Recheck Medication     Diabetes  CAD  Htn  COPD     She has been doing pretty well with her breathing.  Oxygen machine wasn't working for her up to 4 liters, now set up.  Taking Advair, Incruse, and albuterol.  Working ok for her.  Trilogy machine when sleeping, pulsates on her, takes a while to fall asleep. Sometimes uses trazodone.     BP is ok today, hasn't had her medications yet.      Diabetes is ok, last night was 97.  Taking glyburide-metformin combo pill.  Checks once a day.  Average 120-140 range.      Home care is still going, OT is done.     prozac is there , mood is up and down.     No chest pains.  Weight is up 25 pounds in 5 months, says she isn't eating more.     Past Medical History:   Diagnosis Date     ASCVD (arteriosclerotic cardiovascular disease)     3 vessel bypass 3/2017     Congestive heart failure (H)      COPD (chronic obstructive pulmonary disease) (H)      Coronary artery disease     h/o coronary stents x 2     Depressive disorder      Diabetes (H)      History of blood transfusion     1985     Hypertension      Kidney disease      Current Outpatient Prescriptions   Medication     ALPRAZolam (XANAX) 0.5 MG tablet     amLODIPine (NORVASC) 5 MG tablet     aspirin 81 MG EC tablet     atorvastatin (LIPITOR) 80 MG tablet     carvedilol (COREG) 25 MG tablet     FLUoxetine (PROZAC) 20 MG capsule     fluticasone-salmeterol (ADVAIR) 100-50 MCG/DOSE diskus inhaler     furosemide (LASIX) 40 MG tablet     glyBURIDE (DIABETA /MICRONASE) 5 MG tablet     guaiFENesin (MUCINEX) 600 MG 12 hr tablet     ipratropium - albuterol 0.5 mg/2.5 mg/3 mL (DUONEB) 0.5-2.5 (3) MG/3ML neb solution     Isosorbide Mononitrate  MG TB24     metFORMIN (GLUCOPHAGE) 500 MG tablet     nystatin (MYCOSTATIN) 781806 UNIT/GM POWD     traZODone (DESYREL) 50 MG  tablet     umeclidinium (INCRUSE ELLIPTA) 62.5 MCG/INH oral inhaler     VENTOLIN  (90 BASE) MCG/ACT Inhaler     blood glucose monitoring (NO BRAND SPECIFIED) test strip     nitroGLYcerin (NITROSTAT) 0.4 MG sublingual tablet     order for DME     [DISCONTINUED] atorvastatin (LIPITOR) 80 MG tablet     [DISCONTINUED] Isosorbide Mononitrate  MG TB24     No current facility-administered medications for this visit.      Social History   Substance Use Topics     Smoking status: Former Smoker     Packs/day: 1.00     Years: 30.00     Quit date: 12/30/2016     Smokeless tobacco: Never Used     Alcohol use Not on file     Review of Systems  Constitutional-Weight gain.  ENT-No earpain, sore throat, voice changes or rhinitis.  Cardiac-No chest pain or palpitations.  Respiratory-No cough, chronic sob but better  GI-No nausea, vomitting, diarrhea, constipation, or blood in the stool.  Musculoskeletal-No muscles aches or joint pains.  Psych-No depression.  Endocrine- no temp variances and Sugars are stable.    Physical Exam  /64  Pulse 70  Temp 98.3  F (36.8  C) (Temporal)  Resp 16  Wt 211 lb (95.7 kg)  SpO2 92%  BMI 38.59 kg/m2  General Appearance-alert, no distress  Cardiac-regular rate and rhythm  with normal S1, S2 ; no murmur, rub or gallops  Lungs-mild to moderate decreased air movement  Extremities-no peripheral edema, peripheral pulses normal  Skin-Skin color, texture, turgor normal. No rashes or lesions.    ASSESSMENT:  This is a 53-year-old woman who has a chronic history of COPD.  She has always has some shortness of breath but doing pretty well.  She has not been in the hospital for 3-4 months.  She saw pulmonary in July she is on Advair and in [place] inhaler.  She has home oxygen now which is stable for her.    Her diabetes is doing okay she does have a few low sugars.  Unfortunately she has had significant weight gain of 25 pounds in 3-4 months.  We will break apart her metformin and glyburide  to see if this will make a difference, will lower her glyburide from 5 mg twice a day to 5 mg once a day.  We will also check her A1c today.    Hyperlipidemia and coronary artery disease she is on a statin we will refill her atorvastatin and check her lipids today.    Obesity the patient does need to lose weight, watch her diet try to exercise as tolerated.    Electronically signed by Dorian Cade MD

## 2018-08-14 NOTE — TELEPHONE ENCOUNTER
This PA request was submitted to the insurance by the [place] Prior Authorization Team.  If you have any questions in regards to this request, we can be reached at [phone].     Thanks    PA Initiation    Medication: Glyburide 5 mg   Insurance Company: [employer] - Phone [phone] Fax [phone]  Pharmacy Filling the Rx: [address] [place] [address]   Filling Pharmacy Phone: [phone]  Filling Pharmacy Fax:    Start Date: 8/14/2018

## 2018-08-15 NOTE — TELEPHONE ENCOUNTER
Prior Authorization Approval    Authorization Effective Date: 7/14/2018  Authorization Expiration Date: 8/14/2019  Medication: Glyburide 5 mg  - APPROVED   Approved Dose/Quantity:   Reference #:     Insurance Company: [employer] - Phone [phone] Fax [phone]  Expected CoPay:       CoPay Card Available:      Foundation Assistance Needed:    Which Pharmacy is filling the prescription (Not needed for infusion/clinic administered): [facility] [address]   Pharmacy Notified: Yes  Patient Notified: Yes

## 2018-08-17 NOTE — TELEPHONE ENCOUNTER
Patient called back with concerns on lab work and why she needs he ultrasound. Please call patient. She was crying on the phone.  No RN's were available to talk to patient.    Cindy Israel, [facility]

## 2018-08-20 NOTE — TELEPHONE ENCOUNTER
Spoke with patient and gave message. She feels much better now, and has an ultrasound set up for tomorrow in [place]. No further questions.

## 2018-08-20 NOTE — TELEPHONE ENCOUNTER
Just precautionary and her labs were off slightly so I want to check the liver out, no reason for concern, just set up when it works for her.

## 2018-08-21 ENCOUNTER — RADIANT APPOINTMENT (OUTPATIENT)
Dept: ULTRASOUND IMAGING | Facility: OTHER | Age: 53
End: 2018-08-21
Attending: INTERNAL MEDICINE
Payer: COMMERCIAL

## 2018-08-21 DIAGNOSIS — R79.89 ELEVATED LIVER FUNCTION TESTS: ICD-10-CM

## 2018-08-21 PROCEDURE — 76700 US EXAM ABDOM COMPLETE: CPT

## 2018-08-28 ENCOUNTER — TELEPHONE (OUTPATIENT)
Dept: INTERNAL MEDICINE | Facility: CLINIC | Age: 53
End: 2018-08-28

## 2018-08-28 DIAGNOSIS — E66.01 MORBID OBESITY (H): ICD-10-CM

## 2018-08-28 DIAGNOSIS — E11.65 TYPE 2 DIABETES MELLITUS WITH HYPERGLYCEMIA, WITHOUT LONG-TERM CURRENT USE OF INSULIN (H): ICD-10-CM

## 2018-08-28 DIAGNOSIS — I25.10 CORONARY ARTERY DISEASE INVOLVING NATIVE CORONARY ARTERY OF NATIVE HEART, ANGINA PRESENCE UNSPECIFIED: ICD-10-CM

## 2018-08-28 DIAGNOSIS — F33.9 RECURRENT MAJOR DEPRESSIVE DISORDER, REMISSION STATUS UNSPECIFIED (H): ICD-10-CM

## 2018-08-28 DIAGNOSIS — R79.89 ELEVATED SERUM CREATININE: ICD-10-CM

## 2018-08-28 DIAGNOSIS — R74.8 ELEVATED LIVER ENZYMES: Primary | ICD-10-CM

## 2018-08-28 DIAGNOSIS — J44.9 CHRONIC OBSTRUCTIVE PULMONARY DISEASE, UNSPECIFIED COPD TYPE (H): ICD-10-CM

## 2018-08-28 DIAGNOSIS — I10 ESSENTIAL HYPERTENSION WITH GOAL BLOOD PRESSURE LESS THAN 140/90: ICD-10-CM

## 2018-08-28 LAB
ALBUMIN SERPL-MCNC: 3 G/DL (ref 3.4–5)
ALP SERPL-CCNC: 604 U/L (ref 40–150)
ALT SERPL W P-5'-P-CCNC: 95 U/L (ref 0–50)
ANION GAP SERPL CALCULATED.3IONS-SCNC: 7 MMOL/L (ref 3–14)
AST SERPL W P-5'-P-CCNC: 55 U/L (ref 0–45)
BILIRUB SERPL-MCNC: 0.4 MG/DL (ref 0.2–1.3)
BUN SERPL-MCNC: 33 MG/DL (ref 7–30)
CALCIUM SERPL-MCNC: 8.9 MG/DL (ref 8.5–10.1)
CHLORIDE SERPL-SCNC: 97 MMOL/L (ref 94–109)
CO2 SERPL-SCNC: 32 MMOL/L (ref 20–32)
CREAT SERPL-MCNC: 1.83 MG/DL (ref 0.52–1.04)
GFR SERPL CREATININE-BSD FRML MDRD: 29 ML/MIN/1.7M2
GLUCOSE SERPL-MCNC: 211 MG/DL (ref 70–99)
HGB BLD-MCNC: 9.7 G/DL (ref 11.7–15.7)
POTASSIUM SERPL-SCNC: 4.2 MMOL/L (ref 3.4–5.3)
PROT SERPL-MCNC: 7.4 G/DL (ref 6.8–8.8)
SODIUM SERPL-SCNC: 136 MMOL/L (ref 133–144)

## 2018-08-28 PROCEDURE — 36415 COLL VENOUS BLD VENIPUNCTURE: CPT | Performed by: INTERNAL MEDICINE

## 2018-08-28 PROCEDURE — 85018 HEMOGLOBIN: CPT | Performed by: INTERNAL MEDICINE

## 2018-08-28 PROCEDURE — 80053 COMPREHEN METABOLIC PANEL: CPT | Performed by: INTERNAL MEDICINE

## 2018-08-28 NOTE — TELEPHONE ENCOUNTER
Reason for Call:  Request for results:    Name of test or procedure: labs    Date of test of procedure: 8/28/18, today    Location of the test or procedure: [facility] to leave the result message on voice mail or with a family member? Not Applicable    Phone number Patient can be reached at:  [phone], as her cell phone is not working    Additional comments: patient calling would like provider to call this cell phone, as hers is not working to give her the results of her labs being done today    Call taken on 8/28/2018 at 11:30 AM by Karen Sheridan

## 2018-08-28 NOTE — TELEPHONE ENCOUNTER
Her labs are the same. Liver tests are still up some, kidneys are off a little. Please see how she is feeling, any abd pain or vomiting?

## 2018-08-28 NOTE — TELEPHONE ENCOUNTER
Patient informed of results.  Per Dr. Cade she is to hold the metformin and atorvastatin and recheck comprehensive panel in two weeks.

## 2018-08-29 ENCOUNTER — TELEPHONE (OUTPATIENT)
Dept: INTERNAL MEDICINE | Facility: CLINIC | Age: 53
End: 2018-08-29

## 2018-08-29 NOTE — TELEPHONE ENCOUNTER
Cookie returned call. Gave her the message below. No further questions.  Thank you,  Mariah Dickson- Pt Rep.

## 2018-08-29 NOTE — TELEPHONE ENCOUNTER
Reason for Call:  Other     Detailed comments: Cookie would like to get an update on [facility] and [facility] results so she can discuss them with Bernard when she sees her next.    Phone Number Patient can be reached at: [phone]    Best Time: any    Can we leave a detailed message on this number? YES    Call taken on 8/29/2018 at 10:38 AM by Annamarie Tan

## 2018-08-29 NOTE — TELEPHONE ENCOUNTER
Liver and kidney tests are up some, no clear cause, alk phos is high, lfts got a little better.  Weight gain could be the cause.     Gallbladder is thickened and could be blocked if she has abd pain, trouble is gallbladder surgery would be risky.     Probably should have her see general surgery for elevate alk adolfo and abnormal ultrasound.

## 2018-09-04 RX ORDER — GLYBURIDE 5 MG/1
5 TABLET ORAL 2 TIMES DAILY WITH MEALS
Qty: 180 TABLET | Refills: 1 | Status: SHIPPED | OUTPATIENT
Start: 2018-09-04 | End: 2019-01-01

## 2018-09-04 NOTE — TELEPHONE ENCOUNTER
Ariadna is feeling good even thought her sugars are high.  Her Metformin had been DC'd due to her liver and kindey labs being off.  She has only been taking Glyburide 5mg daily.     Per Dr Cade-juan luis Glyburide to 5mg twice a day, keep track of BGs and make appt.      Patient verbalized understanding and will see Rayo 9/12.         Ariadna will arrive a bit early for lab draw prior to appt.    Sending to PCP for lab orders.    Aleah Hughes RN

## 2018-09-04 NOTE — TELEPHONE ENCOUNTER
Please see what is going on,   Is she on prednisone? Sick?  We may have to increase her glyburide

## 2018-09-04 NOTE — TELEPHONE ENCOUNTER
Patient calling back to states her blood sugars are running very high, yesterday it is 467.  Patient is asking for a call back to discuss further.  Thank you,  Lorelei Asher  Patient Representative

## 2018-09-12 ENCOUNTER — OFFICE VISIT (OUTPATIENT)
Dept: INTERNAL MEDICINE | Facility: CLINIC | Age: 53
End: 2018-09-12
Payer: COMMERCIAL

## 2018-09-12 VITALS
SYSTOLIC BLOOD PRESSURE: 146 MMHG | BODY MASS INDEX: 37.86 KG/M2 | OXYGEN SATURATION: 91 % | HEART RATE: 70 BPM | TEMPERATURE: 98.7 F | DIASTOLIC BLOOD PRESSURE: 68 MMHG | RESPIRATION RATE: 16 BRPM | WEIGHT: 207 LBS

## 2018-09-12 DIAGNOSIS — R74.8 ELEVATED LIVER ENZYMES: ICD-10-CM

## 2018-09-12 DIAGNOSIS — J44.9 CHRONIC OBSTRUCTIVE PULMONARY DISEASE, UNSPECIFIED COPD TYPE (H): ICD-10-CM

## 2018-09-12 DIAGNOSIS — E11.65 TYPE 2 DIABETES MELLITUS WITH HYPERGLYCEMIA, WITHOUT LONG-TERM CURRENT USE OF INSULIN (H): ICD-10-CM

## 2018-09-12 DIAGNOSIS — K80.13 CALCULUS OF GALLBLADDER WITH ACUTE ON CHRONIC CHOLECYSTITIS WITH OBSTRUCTION: Primary | ICD-10-CM

## 2018-09-12 DIAGNOSIS — I25.10 CORONARY ARTERY DISEASE INVOLVING NATIVE CORONARY ARTERY OF NATIVE HEART, ANGINA PRESENCE UNSPECIFIED: ICD-10-CM

## 2018-09-12 DIAGNOSIS — R79.89 ELEVATED SERUM CREATININE: ICD-10-CM

## 2018-09-12 LAB
ALBUMIN SERPL-MCNC: 2.9 G/DL (ref 3.4–5)
ALP SERPL-CCNC: 449 U/L (ref 40–150)
ALT SERPL W P-5'-P-CCNC: 40 U/L (ref 0–50)
ANION GAP SERPL CALCULATED.3IONS-SCNC: 8 MMOL/L (ref 3–14)
AST SERPL W P-5'-P-CCNC: 21 U/L (ref 0–45)
BILIRUB SERPL-MCNC: 0.4 MG/DL (ref 0.2–1.3)
BUN SERPL-MCNC: 25 MG/DL (ref 7–30)
CALCIUM SERPL-MCNC: 8.7 MG/DL (ref 8.5–10.1)
CHLORIDE SERPL-SCNC: 89 MMOL/L (ref 94–109)
CO2 SERPL-SCNC: 33 MMOL/L (ref 20–32)
CREAT SERPL-MCNC: 1.43 MG/DL (ref 0.52–1.04)
GFR SERPL CREATININE-BSD FRML MDRD: 38 ML/MIN/1.7M2
GLUCOSE SERPL-MCNC: 540 MG/DL (ref 70–99)
POTASSIUM SERPL-SCNC: 3.9 MMOL/L (ref 3.4–5.3)
PROT SERPL-MCNC: 7.4 G/DL (ref 6.8–8.8)
SODIUM SERPL-SCNC: 130 MMOL/L (ref 133–144)

## 2018-09-12 PROCEDURE — 80053 COMPREHEN METABOLIC PANEL: CPT | Performed by: INTERNAL MEDICINE

## 2018-09-12 PROCEDURE — 36415 COLL VENOUS BLD VENIPUNCTURE: CPT | Performed by: INTERNAL MEDICINE

## 2018-09-12 PROCEDURE — 99215 OFFICE O/P EST HI 40 MIN: CPT | Performed by: INTERNAL MEDICINE

## 2018-09-12 RX ORDER — INSULIN GLARGINE 100 [IU]/ML
15 INJECTION, SOLUTION SUBCUTANEOUS DAILY
Qty: 15 ML | Refills: 1 | Status: SHIPPED | OUTPATIENT
Start: 2018-09-12 | End: 2018-01-01

## 2018-09-12 ASSESSMENT — PAIN SCALES - GENERAL: PAINLEVEL: MILD PAIN (3)

## 2018-09-12 NOTE — MR AVS SNAPSHOT
After Visit Summary   9/12/2018    Bernard Hughes    MRN: [id number]           Patient Information     Date Of Birth          1965        Visit Information        Provider Department      9/12/2018 2:30 PM Dorian Cade MD [facility]        Today's Diagnoses     Type 2 diabetes mellitus with hyperglycemia, without long-term current use of insulin (H)    -  1    Elevated liver enzymes        Elevated serum creatinine        Calculus of gallbladder with acute on chronic cholecystitis with obstruction          Care Instructions    Your appointment with the general surgeon is tomorrow Sept 13th at 2:30 pm with Dr. Palmer.           Follow-ups after your visit        Who to contact     If you have questions or need follow up information about today's clinic visit or your schedule please contact [facility] directly at [phone].  Normal or non-critical lab and imaging results will be communicated to you by MyChart, letter or phone within 4 business days after the clinic has received the results. If you do not hear from us within 7 days, please contact the clinic through MyChart or phone. If you have a critical or abnormal lab result, we will notify you by phone as soon as possible.  Submit refill requests through [employer] or call your pharmacy and they will forward the refill request to us. Please allow 3 business days for your refill to be completed.          Additional Information About Your Visit        Care EveryWhere ID     This is your Care EveryWhere ID. This could be used by other organizations to access your [facility] medical records  [id number]        Your Vitals Were     Pulse Temperature Respirations Pulse Oximetry BMI (Body Mass Index)       70 98.7  F (37.1  C) (Temporal) 16 91% 37.86 kg/m2        Blood Pressure from Last 3 Encounters:   09/12/18 146/68   08/14/18 138/64   04/16/18 116/72    Weight from Last 3 Encounters:   09/12/18 207 lb (93.9 kg)    08/14/18 211 lb (95.7 kg)   04/16/18 195 lb (88.5 kg)              We Performed the Following     Comprehensive metabolic panel (BMP + Alb, Alk Phos, ALT, AST, Total. Bili, TP)          Today's Medication Changes          These changes are accurate as of 9/12/18  3:17 PM.  If you have any questions, ask your nurse or doctor.               Start taking these medicines.        Dose/Directions    BASAGLAR 100 UNIT/ML injection   Used for:  Type 2 diabetes mellitus with hyperglycemia, without long-term current use of insulin (H)   Started by:  Dorian Cade MD        Dose:  15 Units   Inject 15 Units Subcutaneous daily   Quantity:  15 mL   Refills:  1       insulin pen needle 32G X 4 MM   Commonly known as:  BD MYRIAM U/F   Used for:  Type 2 diabetes mellitus with hyperglycemia, without long-term current use of insulin (H)   Started by:  Dorian Cade MD        Use 1 daily or as directed.   Quantity:  100 each   Refills:  1            Where to get your medicines      These medications were sent to [address] , [place] [id number]     Phone:  [phone]     BASAGLAR 100 UNIT/ML injection    insulin pen needle 32G X 4 MM                Primary Care Provider Office Phone # Fax #    Dorian Cade [phone]       [address] [facility] [address]        Goals        General    Transportation       Equal Access to Services     SUSIE MOLINA AH: Hadii jacek ku hadasho Soomaali, waaxda luqadaha, qaybta kaalmada adeegyada, hermes chiang. So [facility] [phone].    ATENCIÓN: Si habla español, tiene a hitchcock disposición servicios gratuitos de asistencia lingüística. Mónica al [phone].    We comply with applicable federal civil rights laws and [place] laws. We do not discriminate on the basis of race, color, national origin, age, disability, sex, sexual orientation, or gender identity.            Thank you!      Thank you for choosing [facility]  for your care. Our goal is always to provide you with excellent care. Hearing back from our patients is one way we can continue to improve our services. Please take a few minutes to complete the written survey that you may receive in the mail after your visit with us. Thank you!             Your Updated Medication List - Protect others around you: Learn how to safely use, store and throw away your medicines at www.disposemymeds.org.          This list is accurate as of 9/12/18  3:17 PM.  Always use your most recent med list.                   Brand Name Dispense Instructions for use Diagnosis    ALPRAZolam 0.5 MG tablet    XANAX    30 tablet    Take 1 tablet (0.5 mg) by mouth 3 times daily as needed for anxiety (To use no more then twice a week.)    Adjustment disorder with anxious mood, Chronic obstructive pulmonary disease, unspecified COPD type (H)       amLODIPine 5 MG tablet    NORVASC    45 tablet    Take 0.5 tablets (2.5 mg) by mouth daily    Essential hypertension with goal blood pressure less than 140/90       aspirin 81 MG EC tablet     90 tablet    Take 1 tablet (81 mg) by mouth daily    Type 2 diabetes, HbA1C goal < 8% (H), Hypertension goal BP (blood pressure) < 140/90       atorvastatin 80 MG tablet    LIPITOR    90 tablet    Take 1 tablet (80 mg) by mouth daily    Type 2 diabetes mellitus with hyperglycemia, without long-term current use of insulin (H)       BASAGLAR 100 UNIT/ML injection     15 mL    Inject 15 Units Subcutaneous daily    Type 2 diabetes mellitus with hyperglycemia, without long-term current use of insulin (H)       blood glucose monitoring test strip    no brand specified    100 strip    Use to test blood sugars one times daily or as directed    Type 2 diabetes mellitus with hyperglycemia, without long-term current use of insulin (H)       carvedilol 25 MG tablet    COREG    180 tablet    Take 1 tablet (25 mg) by mouth 2 times daily  (with meals)    Essential hypertension with goal blood pressure less than 140/90       FLUoxetine 20 MG capsule    PROzac    360 capsule    Take 4 capsules (80 mg) by mouth daily    Recurrent major depressive disorder, remission status unspecified (H)       fluticasone-salmeterol 100-50 MCG/DOSE diskus inhaler    ADVAIR    1 Inhaler    Inhale 1 puff into the lungs 2 times daily as needed        furosemide 40 MG tablet    LASIX    30 tablet    Take 1 tablet (40 mg) by mouth daily    Essential hypertension with goal blood pressure less than 140/90       glyBURIDE 5 MG tablet    DIABETA /MICRONASE    180 tablet    Take 1 tablet (5 mg) by mouth 2 times daily (with meals)    Type 2 diabetes mellitus with hyperglycemia, without long-term current use of insulin (H)       guaiFENesin 600 MG 12 hr tablet    MUCINEX    60 tablet    Take 1 tablet (600 mg) by mouth 2 times daily    Cough       INCRUSE ELLIPTA 62.5 MCG/INH inhaler   Generic drug:  umeclidinium      Inhale 1 puff into the lungs daily        insulin pen needle 32G X 4 MM    BD MYRIAM U/F    100 each    Use 1 daily or as directed.    Type 2 diabetes mellitus with hyperglycemia, without long-term current use of insulin (H)       ipratropium - albuterol 0.5 mg/2.5 mg/3 mL 0.5-2.5 (3) MG/3ML neb solution    DUONEB    360 mL    NEBULIZE CONTENTS OF ONE VIAL EVERY 4 HOURS AS NEEDED FOR SHORTNESS OF BREATH / DYSPNEA OR WHEEZING    Chronic obstructive pulmonary disease with acute exacerbation (H), SOB (shortness of breath)       Isosorbide Mononitrate  MG Tb24     180 tablet    Take 1 tablet (120 mg) by mouth 2 times daily    Coronary artery disease involving native coronary artery of native heart, angina presence unspecified       metFORMIN 500 MG tablet    GLUCOPHAGE    360 tablet    Take 2 tablets (1,000 mg) by mouth 2 times daily (with meals)    Type 2 diabetes mellitus with hyperglycemia, without long-term current use of insulin (H)       nitroGLYcerin 0.4 MG  "sublingual tablet    NITROSTAT    25 tablet    For chest pain place 1 tablet under the tongue every 5 minutes for 3 doses. If symptoms persist 5 minutes after 1st dose call 911.    Coronary artery disease involving native coronary artery of native heart, angina presence unspecified       nystatin 750003 UNIT/GM Powd    MYCOSTATIN    60 g    Use as needed to skin    Candidiasis of skin       order for DME     1 Device    2 wheel walker with rear ski glides Height:  5'4\" Weight:  173lbs    Chronic obstructive pulmonary disease, unspecified COPD type (H), Difficulty walking       traZODone 50 MG tablet    DESYREL    90 tablet    Take 1-2 tablets ( mg) by mouth nightly as needed    Insomnia, unspecified type       VENTOLIN  (90 Base) MCG/ACT inhaler   Generic drug:  albuterol     18 g    INHALE 2 PUFFS INTO THE LUNGS EVERY 4 HOURS AS NEEDED FOR SHORTNESS OF BREATH, DIFFICULTY BREATHING OR WHEEZING.    Chronic obstructive pulmonary disease, unspecified COPD type (H)         "

## 2018-09-12 NOTE — PROGRESS NOTES
SUBJECTIVE:   Bernard Hughes is a 53 year old female who presents to clinic today for the following health issues:    Chief Complaint   Patient presents with     Diabetes     follow up - discuss comprehensive results     Abdominal Pain     RUQ pain     She was doing well and her diabetes got better, then gained weight and lfts went up on her.    Her ultrasound was showing some cholelithiasis and thickening.  She has had some RUQ pain, worse with eating, been going on for 6 months.   Throwing up as well, nauseated.     Had stents last spring and has done better.  Couldn't have a cabg due to her breathing.      Sugars are running high , average of 414 for the last week.         Patient is here for follow-up.  She was here in August and doing well with her lungs but unfortunately her alk phos was elevated in the 500 range AST and ALT were elevated.  Her bilirubin was normal.  She did not complaint of right upper quadrant pain then but now in retrospect says she did have some pain for the last 6 months.    She denies fevers.  She has some nausea and maybe even vomiting.  Due to her rising creatinine we did stop her metformin and her sugars have become uncontrolled without it only  on glyburide.    Past Medical History:   Diagnosis Date     ASCVD (arteriosclerotic cardiovascular disease)     3 vessel bypass 3/2017     Congestive heart failure (H)      COPD (chronic obstructive pulmonary disease) (H)      Coronary artery disease     h/o coronary stents x 2     Depressive disorder      Diabetes (H)      History of blood transfusion     1985     Hypertension      Kidney disease      Current Outpatient Prescriptions   Medication     ALPRAZolam (XANAX) 0.5 MG tablet     amLODIPine (NORVASC) 5 MG tablet     aspirin 81 MG EC tablet     atorvastatin (LIPITOR) 80 MG tablet     BASAGLAR 100 UNIT/ML injection     carvedilol (COREG) 25 MG tablet     FLUoxetine (PROZAC) 20 MG capsule     fluticasone-salmeterol (ADVAIR) 100-50  MCG/DOSE diskus inhaler     furosemide (LASIX) 40 MG tablet     glyBURIDE (DIABETA /MICRONASE) 5 MG tablet     guaiFENesin (MUCINEX) 600 MG 12 hr tablet     insulin pen needle (BD MYRIAM U/F) 32G X 4 MM     ipratropium - albuterol 0.5 mg/2.5 mg/3 mL (DUONEB) 0.5-2.5 (3) MG/3ML neb solution     Isosorbide Mononitrate  MG TB24     nystatin (MYCOSTATIN) 059141 UNIT/GM POWD     traZODone (DESYREL) 50 MG tablet     umeclidinium (INCRUSE ELLIPTA) 62.5 MCG/INH oral inhaler     VENTOLIN  (90 BASE) MCG/ACT Inhaler     blood glucose monitoring (NO BRAND SPECIFIED) test strip     metFORMIN (GLUCOPHAGE) 500 MG tablet     nitroGLYcerin (NITROSTAT) 0.4 MG sublingual tablet     order for DME     No current facility-administered medications for this visit.      Social History   Substance Use Topics     Smoking status: Former Smoker     Packs/day: 1.00     Years: 30.00     Quit date: 12/30/2016     Smokeless tobacco: Never Used     Alcohol use Not on file     Review of Systems  Constitutional-No fevers, chills, or weight changes..  Cardiac-No chest pain or palpitations and Exertional SOB.  Respiratory-chronic sob, on oxygen.  GI-nausea, RUQ pain.  Musculoskeletal-No muscles aches or joint pains.  Skin-No rashes.    Physical Exam  /68  Pulse 70  Temp 98.7  F (37.1  C) (Temporal)  Resp 16  Wt 207 lb (93.9 kg)  SpO2 91%  BMI 37.86 kg/m2  General Appearance-healthy, alert, no distress  Cardiac-regular rate and rhythm  with normal S1, S2 ; no murmur, rub or gallops  Lungs-moderate to severe decreased air movement  GI-RUQ pain and tenderness, no other pain  Extremities-no peripheral edema, peripheral pulses normal    ASSESSMENT:  53-year-old woman with severe end-stage COPD on chronic oxygen who also has coronary artery disease, diabetes.  She was supposed to have a bypass surgery last spring but was not medically fit enough therefore had stenting.  She is done much better with her breathing and stenting since then.   She has not been in the hospital since April.  She used to be in the hospital every other week.    Unfortunately in August she had elevated alkaline phosphatase and AST ALT numbers.  These improved slightly on repeat testing.  She also had some acute kidney disease and we had to hold her metformin which has made her diabetes go out of control.  Her abdominal ultrasound does show gallstones and some gallbladder thickening.  She has the symptoms now with right upper quadrant pain and I think she ideally would have her gallbladder removed but she is a high surgical risk.  Today her labs are better AST and ALT are normal alk phos is down to 400.  We will get her in with a general surgeon at [facility] who knows her cardiology and pulmonary teams.  She understands that surgery still may be not the best option but with her continued pain, nausea, abnormal labs I think she may need surgery even though she is high risk.    For her uncontrolled diabetes I am going to start her on insulin Lantus insulin 15 units a day along with her glyburide 5 mg twice a day.  Her creatinine is down to 1.43 and in the future we can possibly get her back on metformin.  However until her gallbladder is definitively treated her creatinine may rise and make the metformin contraindicated.  Therefore we will stop metformin at this time.    I spent greater than 50% of this 45 minute visit in counseling and coordination of care of cholecystitis and cholelithiasis.    Electronically signed by Dorian Cade MD

## 2018-09-13 ENCOUNTER — TRANSFERRED RECORDS (OUTPATIENT)
Dept: HEALTH INFORMATION MANAGEMENT | Facility: CLINIC | Age: 53
End: 2018-09-13

## 2018-09-14 ENCOUNTER — TELEPHONE (OUTPATIENT)
Dept: FAMILY MEDICINE | Facility: CLINIC | Age: 53
End: 2018-09-14

## 2018-09-14 DIAGNOSIS — K81.1 CHRONIC CHOLECYSTITIS: Primary | ICD-10-CM

## 2018-09-14 RX ORDER — HYDROCODONE BITARTRATE AND ACETAMINOPHEN 5; 325 MG/1; MG/1
1 TABLET ORAL EVERY 4 HOURS PRN
Qty: 18 TABLET | Refills: 0 | Status: SHIPPED | OUTPATIENT
Start: 2018-09-14 | End: 2018-01-01

## 2018-09-14 NOTE — TELEPHONE ENCOUNTER
Reason for Call:  Other     Detailed comments: Cookie calls to let Dr Cade know that Bernard had her appt with the surgeon for her gallbladder and felt it is very risky to do the surgery and she made need a trach.  She is scheduled to see him again in a week.      Cookie is wondering if Dr Cade would prescribe a low dose pain med for her while she waits to see what will  Happen.    Phone Number Patient can be reached at: [phone]    Best Time: any    Can we leave a detailed message on this number? YES    Call taken on 9/14/2018 at 4:02 PM by Annamarie Tan

## 2018-09-28 NOTE — TELEPHONE ENCOUNTER
Reason for Call:  Other Patient FYI    Detailed comments: Joann from [employer] states patient has stopped responding to calls so case mgmt will be closed, never followed up with positive depression screen, Joann is hoping patient was seen since June for diabetic follow-up.  Also the [facility] closed out her request for lung transplant consult, can be referred if patient wishes to pursue at a later date.    Phone Number Patient can be reached at: Other phone number:  [phone]  Jan from [employer]*    Best Time:     Can we leave a detailed message on this number? YES    Call taken on 9/28/2018 at 9:57 AM by Lorelei Asher

## 2018-09-28 NOTE — TELEPHONE ENCOUNTER
Reason for Call:  Same Day Appointment, Requested Provider:  Dorian Cade MD    PCP: Dorian Cade    Reason for visit: patient would like to be worked in for a follow up on her gallbladder next week    Duration of symptoms:     Have you been treated for this in the past? Yes    Additional comments:     Can we leave a detailed message on this number? YES    Phone number patient can be reached at: Home number on file [phone] (home) or Work number on file    Best Time: any    Call taken on 9/28/2018 at 4:47 PM by Leigh Hallman

## 2018-10-01 NOTE — MR AVS SNAPSHOT
After Visit Summary   10/1/2018    Bernard Hughes    MRN: [id number]           Patient Information     Date Of Birth          1965        Visit Information        Provider Department      10/1/2018 2:15 PM Dorian Cade MD [facility]         Follow-ups after your visit        Who to contact     If you have questions or need follow up information about today's clinic visit or your schedule please contact [facility] directly at [phone].  Normal or non-critical lab and imaging results will be communicated to you by MyChart, letter or phone within 4 business days after the clinic has received the results. If you do not hear from us within 7 days, please contact the clinic through MyChart or phone. If you have a critical or abnormal lab result, we will notify you by phone as soon as possible.  Submit refill requests through [employer] or call your pharmacy and they will forward the refill request to us. Please allow 3 business days for your refill to be completed.          Additional Information About Your Visit        Care EveryWhere ID     This is your Care EveryWhere ID. This could be used by other organizations to access your [place] [facility] medical records  [id number]        Your Vitals Were     Pulse Temperature Respirations Pulse Oximetry BMI (Body Mass Index)       74 99  F (37.2  C) (Temporal) 16 93% 36.76 kg/m2        Blood Pressure from Last 3 Encounters:   10/01/18 184/82   09/12/18 146/68   08/14/18 138/64    Weight from Last 3 Encounters:   10/01/18 201 lb (91.2 kg)   09/12/18 207 lb (93.9 kg)   08/14/18 211 lb (95.7 kg)              Today, you had the following     No orders found for display       Primary Care Provider Office Phone # Fax #    Dorian Cade [phone] [address] [facility] [address]        Goals        General    Transportation       Equal Access to Services     SUSIE MOLINA AH: Carie gamez  Nader, gabrielda lupraveenaadaha, qabhavanata kaphi alonso, hermes viveros marelara mccartymaddison mariia. So [facility] [phone].    ATENCIÓN: Si elliot reyna, tiene a hitchcock disposición servicios gratuitos de asistencia lingüística. Mónica al [phone].    We comply with applicable federal civil rights laws and [place] laws. We do not discriminate on the basis of race, color, national origin, age, disability, sex, sexual orientation, or gender identity.            Thank you!     Thank you for choosing [facility]  for your care. Our goal is always to provide you with excellent care. Hearing back from our patients is one way we can continue to improve our services. Please take a few minutes to complete the written survey that you may receive in the mail after your visit with us. Thank you!             Your Updated Medication List - Protect others around you: Learn how to safely use, store and throw away your medicines at www.disposemymeds.org.          This list is accurate as of 10/1/18  2:19 PM.  Always use your most recent med list.                   Brand Name Dispense Instructions for use Diagnosis    ALPRAZolam 0.5 MG tablet    XANAX    30 tablet    Take 1 tablet (0.5 mg) by mouth 3 times daily as needed for anxiety (To use no more then twice a week.)    Adjustment disorder with anxious mood, Chronic obstructive pulmonary disease, unspecified COPD type (H)       amLODIPine 5 MG tablet    NORVASC    45 tablet    Take 0.5 tablets (2.5 mg) by mouth daily    Essential hypertension with goal blood pressure less than 140/90       aspirin 81 MG EC tablet     90 tablet    Take 1 tablet (81 mg) by mouth daily    Type 2 diabetes, HbA1C goal < 8% (H), Hypertension goal BP (blood pressure) < 140/90       atorvastatin 80 MG tablet    LIPITOR    90 tablet    Take 1 tablet (80 mg) by mouth daily    Type 2 diabetes mellitus with hyperglycemia, without long-term current use of insulin (H)       BASAGLAR 100 UNIT/ML  injection     15 mL    Inject 15 Units Subcutaneous daily    Type 2 diabetes mellitus with hyperglycemia, without long-term current use of insulin (H)       blood glucose monitoring test strip    no brand specified    100 strip    Use to test blood sugars one times daily or as directed    Type 2 diabetes mellitus with hyperglycemia, without long-term current use of insulin (H)       carvedilol 25 MG tablet    COREG    180 tablet    Take 1 tablet (25 mg) by mouth 2 times daily (with meals)    Essential hypertension with goal blood pressure less than 140/90       FLUoxetine 20 MG capsule    PROzac    360 capsule    Take 4 capsules (80 mg) by mouth daily    Recurrent major depressive disorder, remission status unspecified (H)       fluticasone-salmeterol 100-50 MCG/DOSE diskus inhaler    ADVAIR    1 Inhaler    Inhale 1 puff into the lungs 2 times daily as needed        furosemide 40 MG tablet    LASIX    30 tablet    Take 1 tablet (40 mg) by mouth daily    Essential hypertension with goal blood pressure less than 140/90       glyBURIDE 5 MG tablet    DIABETA /MICRONASE    180 tablet    Take 1 tablet (5 mg) by mouth 2 times daily (with meals)    Type 2 diabetes mellitus with hyperglycemia, without long-term current use of insulin (H)       guaiFENesin 600 MG 12 hr tablet    MUCINEX    60 tablet    Take 1 tablet (600 mg) by mouth 2 times daily    Cough       HYDROcodone-acetaminophen 5-325 MG per tablet    NORCO    18 tablet    Take 1 tablet by mouth every 4 hours as needed for pain    Chronic cholecystitis       INCRUSE ELLIPTA 62.5 MCG/INH inhaler   Generic drug:  umeclidinium      Inhale 1 puff into the lungs daily        insulin pen needle 32G X 4 MM    BD MYRIAM U/F    100 each    Use 1 daily or as directed.    Type 2 diabetes mellitus with hyperglycemia, without long-term current use of insulin (H)       ipratropium - albuterol 0.5 mg/2.5 mg/3 mL 0.5-2.5 (3) MG/3ML neb solution    DUONEB    360 mL    NEBULIZE  "CONTENTS OF ONE VIAL EVERY 4 HOURS AS NEEDED FOR SHORTNESS OF BREATH / DYSPNEA OR WHEEZING    Chronic obstructive pulmonary disease with acute exacerbation (H), SOB (shortness of breath)       Isosorbide Mononitrate  MG Tb24     180 tablet    Take 1 tablet (120 mg) by mouth 2 times daily    Coronary artery disease involving native coronary artery of native heart, angina presence unspecified       nitroGLYcerin 0.4 MG sublingual tablet    NITROSTAT    25 tablet    For chest pain place 1 tablet under the tongue every 5 minutes for 3 doses. If symptoms persist 5 minutes after 1st dose call 911.    Coronary artery disease involving native coronary artery of native heart, angina presence unspecified       nystatin 187766 UNIT/GM Powd    MYCOSTATIN    60 g    Use as needed to skin    Candidiasis of skin       order for DME     1 Device    2 wheel walker with rear ski glides Height:  5'4\" Weight:  173lbs    Chronic obstructive pulmonary disease, unspecified COPD type (H), Difficulty walking       traZODone 50 MG tablet    DESYREL    90 tablet    Take 1-2 tablets ( mg) by mouth nightly as needed    Insomnia, unspecified type       VENTOLIN  (90 Base) MCG/ACT inhaler   Generic drug:  albuterol     18 g    INHALE 2 PUFFS INTO THE LUNGS EVERY 4 HOURS AS NEEDED FOR SHORTNESS OF BREATH, DIFFICULTY BREATHING OR WHEEZING.    Chronic obstructive pulmonary disease, unspecified COPD type (H)         "

## 2018-10-01 NOTE — PROGRESS NOTES
SUBJECTIVE:   Bernard Hughes is a 53 year old female who presents to clinic today for the following health issues:      Chief Complaint   Patient presents with     Ear Problem     unable to hear out of right ear     Abdominal Pain     discuss gall bladder surgery     Saw general surgery and they are worried that she will need a tracheostomy.  Sees her pulmonologist coming up to discuss surgery.      Has abd pain and has norco, 2 pills left.  It helps.  Uses a half twice a day.      Right ear is plugged with q tip using as well.    Past Medical History:   Diagnosis Date     ASCVD (arteriosclerotic cardiovascular disease)     3 vessel bypass 3/2017     Congestive heart failure (H)      COPD (chronic obstructive pulmonary disease) (H)      Coronary artery disease     h/o coronary stents x 2     Depressive disorder      Diabetes (H)      History of blood transfusion     1985     Hypertension      Kidney disease      Current Outpatient Prescriptions   Medication     ALPRAZolam (XANAX) 0.5 MG tablet     amLODIPine (NORVASC) 5 MG tablet     aspirin 81 MG EC tablet     atorvastatin (LIPITOR) 80 MG tablet     BASAGLAR 100 UNIT/ML injection     blood glucose monitoring (NO BRAND SPECIFIED) test strip     carvedilol (COREG) 25 MG tablet     FLUoxetine (PROZAC) 20 MG capsule     fluticasone-salmeterol (ADVAIR) 100-50 MCG/DOSE diskus inhaler     furosemide (LASIX) 40 MG tablet     glyBURIDE (DIABETA /MICRONASE) 5 MG tablet     guaiFENesin (MUCINEX) 600 MG 12 hr tablet     HYDROcodone-acetaminophen (NORCO) 5-325 MG per tablet     ipratropium - albuterol 0.5 mg/2.5 mg/3 mL (DUONEB) 0.5-2.5 (3) MG/3ML neb solution     Isosorbide Mononitrate  MG TB24     nystatin (MYCOSTATIN) 636428 UNIT/GM POWD     traZODone (DESYREL) 50 MG tablet     umeclidinium (INCRUSE ELLIPTA) 62.5 MCG/INH oral inhaler     VENTOLIN  (90 BASE) MCG/ACT Inhaler     insulin pen needle (BD MYRIAM U/F) 32G X 4 MM     nitroGLYcerin (NITROSTAT) 0.4 MG  sublingual tablet     order for DME     No current facility-administered medications for this visit.      Social History   Substance Use Topics     Smoking status: Former Smoker     Packs/day: 1.00     Years: 30.00     Quit date: 12/30/2016     Smokeless tobacco: Never Used     Alcohol use Not on file     Physical Exam  /82  Pulse 74  Temp 99  F (37.2  C) (Temporal)  Resp 16  Wt 201 lb (91.2 kg)  SpO2 93%  BMI 36.76 kg/m2  General Appearance-healthy, alert, no distress  Right ear is plugged with brown wax, left is clear    ASSESSMENT:  53-year-old woman who has severe COPD end-stage on oxygen.  She also has now gallbladder disease chronic cholecystitis which needs a surgeon to remove her gallbladder.  Unfortunately the surgeon told her that she have a 50% chance of going on a tracheostomy with this surgery.  We discussed the aspects of a tracheostomy would be a couple weeks after having an intubation if she could get off the ventilator.  There is also a chance of death which could be quite high chance of infection.  All of these think she does have a trilogy machine that she is used before and she may need to go on that for a while.  She will see her primary pulmonologist next week and I think they can answer her questions better by try to calm her down and give her the different options.  Because this is not an elective surgery I think she should proceed with it despite the risk.  She is certainly at risk of having sepsis and other diseases from her gallbladder and currently is needing pain medications to manage the pain.  She not eating as well she is debilitating her quality of life therefore she needs surgery.    Her right ear is plugged and she cannot hear out of it, she has used Q-tips in it.  We will flush this with water today to get rid of the brown wax that is covering her eardrum.    I spent greater than 50% of this 35 minute visit in counseling and coordination of care of her copd and  gallbladder disease.      Electronically signed by Dorian Cade MD

## 2018-10-01 NOTE — PROGRESS NOTES
Injectable Influenza Immunization Documentation    1.  Is the person to be vaccinated sick today?   No    2. Does the person to be vaccinated have an allergy to a component   of the vaccine?   No  Egg Allergy Algorithm Link    3. Has the person to be vaccinated ever had a serious reaction   to influenza vaccine in the past?   No    4. Has the person to be vaccinated ever had Guillain-Barré syndrome?   No    Form completed by Danitza Crane CMA  Prior to injection verified patient identity using patient's name and date of birth.  Due to injection administration, patient instructed to remain in clinic for 15 minutes  afterwards, and to report any adverse reaction to me immediately.

## 2018-10-12 NOTE — TELEPHONE ENCOUNTER
"Last Written Prescription Date:  6/29/18  Last Fill Quantity: 30,  # refills: 1   Last office visit: 11/6/2017 with prescribing provider:  Dorian Cade   Future Office Visit:      Requested Prescriptions   Pending Prescriptions Disp Refills     furosemide (LASIX) 40 MG tablet [Pharmacy Med Name: FUROSEMIDE 40MG TABS] 30 tablet 1     Sig: TAKE ONE TABLET BY MOUTH EVERY DAY    Diuretics (Including Combos) Protocol Failed    10/11/2018 12:11 PM       Failed - Blood pressure under 140/90 in past 12 months    BP Readings from Last 3 Encounters:   10/01/18 184/82   09/12/18 146/68   08/14/18 138/64                Failed - Normal serum creatinine on file in past 12 months    Recent Labs   Lab Test  09/12/18   1414   CR  1.43*             Failed - Normal serum sodium on file in past 12 months    Recent Labs   Lab Test  09/12/18   1414   NA  130*             Passed - Recent (12 mo) or future (30 days) visit within the authorizing provider's specialty    Patient had office visit in the last 12 months or has a visit in the next 30 days with authorizing provider or within the authorizing provider's specialty.  See \"Patient Info\" tab in inbasket, or \"Choose Columns\" in Meds & Orders section of the refill encounter.           Passed - Patient is age 18 or older       Passed - No active pregancy on record       Passed - Normal serum potassium on file in past 12 months    Recent Labs   Lab Test  09/12/18   1414   POTASSIUM  3.9                   Passed - No positive pregnancy test in past 12 months        Routing refill request to provider for review/approval because:  Labs out of range:  BP, Na, CR    Kiana Llanos RN          "

## 2018-10-22 NOTE — TELEPHONE ENCOUNTER
Hydrocodone      Last Written Prescription Date:  10/01/18  Last Fill Quantity: 30,   # refills: 0  Last Office Visit: 10/01/18  Future Office visit:       Routing refill request to provider for review/approval because:  Drug not on the [facility], [facility] or [facility] refill protocol or controlled substance

## 2018-10-22 NOTE — TELEPHONE ENCOUNTER
Reason for Call:  Medication or medication refill:    Do you use a [facility]?  Name of the pharmacy and phone number for the current request:  [facility] [place]- [phone]    Name of the medication requested: Norco     Other request: Pt is having gall bladder issues, they cannot do the surgery yet due to other medical issues. She is hoping for at least just a small supply. She is out. Please call either way.     Can we leave a detailed message on this number? YES    Phone number patient can be reached at: Home number on file [phone] (home)    Best Time: any     Call taken on 10/22/2018 at 7:41 AM by Mariah Dickson

## 2018-11-05 NOTE — TELEPHONE ENCOUNTER
Reason for Call:  Other call back    Detailed comments: Cookie from home care states patient is not a home care episode but wondering if Dr Cade could place an order due to patients pain and shortness of breath.  Cookie also states they will be calling to the [facility] to request another refill on patients pain meds.  Cookie was advised PCP is not in clinic today    Phone Number Patient can be reached at: Other phone number:  [phone]  Cookie*    Best Time:     Can we leave a detailed message on this number? YES    Call taken on 11/5/2018 at 10:16 AM by Lorelei Asher

## 2018-11-05 NOTE — TELEPHONE ENCOUNTER
[place]       Last Written Prescription Date:  10/22/2018  Last Fill Quantity: 30,   # refills: 0  Last Office Visit: 10/01/2018  Future Office visit:       Routing refill request to provider for review/approval because:  Drug not on the [facility], [facility] or [facility] refill protocol or controlled substance  Amirah Melton MA

## 2018-11-06 NOTE — TELEPHONE ENCOUNTER
Cookie is calling back stating she saw Bernard for nurse visit yesterday. Bernard has chronic illness and depressed and anxious. Looking to work on her depression and anxiety and wondering Dr. Cade can do a home care episode for her so they can see her more often. Right now she's using community Palliative benefits and they are being used up quickly. If you can order home care, they would be able to get more help for her in the home. Please call Cookie back at [phone], she will have her cell phone with her all afternoon today. If you can do the orders please fax to [phone].

## 2018-11-06 NOTE — TELEPHONE ENCOUNTER
Patient is calling back. She said she takes 1/2 of a tab at a time and never goes over 2 tabs a day. She uses half so she doesn't run out too soon. She currently has 7 tabs left. Just trying to stay on top of it with the request so it can be filled when she needs it.   Thank you,  Yeimy Lawson   for [facility]

## 2018-11-06 NOTE — TELEPHONE ENCOUNTER
Left message to call back. Please find out what Cookie is needing, if possible scheduled a visit with Dr. Cade.

## 2018-11-06 NOTE — TELEPHONE ENCOUNTER
PA needed on:Hydrocodone/APAP 5/325mg (due to qty/day supply limits)  Insurance:[employer]  Ins. Phone:[phone]  Patient ID:[id number]  PCN:[id number]  BIN:[id number]    Please let us know when PA is granted/denied. Thank You      [facility], [place]

## 2018-11-06 NOTE — TELEPHONE ENCOUNTER
Signed original RX delivered to [facility]. Stephanie,

## 2018-11-07 NOTE — TELEPHONE ENCOUNTER
Lyn Toledo with [facility] calling. They received the referral for home care but the pt is already open to home care through [facility].   Lyn also states that she spoke with Cookie at [facility] and she has been trying to reach you and has not hear back FYI. She did not have her contact info.   You can reach Lyn back at [phone] with any questions.   Thank you,  Mariah Dickson- Pt Rep.

## 2018-11-07 NOTE — TELEPHONE ENCOUNTER
Prior Authorization Approval    Authorization Effective Date: 10/8/2018  Authorization Expiration Date: 11/7/2019  Medication: norco-APPROVED  Approved Dose/Quantity:    Reference #: [id number]   Insurance Company: [employer] - Phone [phone] Fax [phone]  Expected CoPay:       CoPay Card Available:      Foundation Assistance Needed:    Which Pharmacy is filling the prescription (Not needed for infusion/clinic administered): [facility] [address]   Pharmacy Notified: Yes  Patient Notified: Yes

## 2018-11-07 NOTE — TELEPHONE ENCOUNTER
Central Prior Authorization Team   Phone: [phone]      PA Initiation    Medication: norco  Insurance Company: [employer] - Phone [phone] Fax [phone]  Pharmacy Filling the Rx: [address]   Filling Pharmacy Phone: [phone]  Filling Pharmacy Fax: [phone]  Start Date: 11/7/2018

## 2018-11-07 NOTE — TELEPHONE ENCOUNTER
Message was left on 11/5/18 and Cookie called back and orders were placed. Cookie was contacted and is requesting the HC orders to be faxed to [phone].

## 2018-11-09 NOTE — TELEPHONE ENCOUNTER
Patient is due for a PHQ-9.  Index start date:8/16/2018  Index end date:12/16/2018    Please call patient.     <<-----Click here for Discharge Medication Review

## 2018-11-20 NOTE — TELEPHONE ENCOUNTER
Panel Management Review      Patient has the following on her problem list:     Diabetes    ASA: Passed    Last A1C  Lab Results   Component Value Date    A1C 6.8 08/14/2018    A1C 7.6 02/20/2018    A1C 6.8 10/13/2017    A1C 6.6 07/26/2017    A1C 7.3 02/21/2017     A1C tested: Passed    Last LDL:    Lab Results   Component Value Date    CHOL 154 08/14/2018     Lab Results   Component Value Date    HDL 49 08/14/2018     Lab Results   Component Value Date    LDL 86 08/14/2018     Lab Results   Component Value Date    TRIG 97 08/14/2018     No results found for: RICOORLANDOJAMARI  Lab Results   Component Value Date    NHDL 105 08/14/2018       Is the patient on a Statin? YES             Is the patient on Aspirin? YES    Medications     HMG CoA Reductase Inhibitors    atorvastatin (LIPITOR) 80 MG tablet    Salicylates    aspirin 81 MG EC tablet          Last three blood pressure readings:  BP Readings from Last 3 Encounters:   10/01/18 184/82   09/12/18 146/68   08/14/18 138/64       Date of last diabetes office visit: 10/01/18     Tobacco History:     History   Smoking Status     Former Smoker     Packs/day: 1.00     Years: 30.00     Quit date: 12/30/2016   Smokeless Tobacco     Never Used           IVD   ASA: pass    Last LDL:    Lab Results   Component Value Date    CHOL 154 08/14/2018     Lab Results   Component Value Date    HDL 49 08/14/2018     Lab Results   Component Value Date    LDL 86 08/14/2018     Lab Results   Component Value Date    TRIG 97 08/14/2018      No results found for: JONATHAN     Is the patient on a Statin? YES   Is the patient on Aspirin? YES                  Medications     HMG CoA Reductase Inhibitors    atorvastatin (LIPITOR) 80 MG tablet    Salicylates    aspirin 81 MG EC tablet          Last three blood pressure readings:  BP Readings from Last 3 Encounters:   10/01/18 184/82   09/12/18 146/68   08/14/18 138/64        Tobacco History:     History   Smoking Status     Former Smoker      Packs/day: 1.00     Years: 30.00     Quit date: 12/30/2016   Smokeless Tobacco     Never Used       Hypertension   Last three blood pressure readings:  BP Readings from Last 3 Encounters:   10/01/18 184/82   09/12/18 146/68   08/14/18 138/64     Blood pressure: FAILED    HTN Guidelines:  Age 18-59 BP range:  Less than 140/90  Age 60-85 with Diabetes:  Less than 140/90  Age 60-85 without Diabetes:  less than 150/90      Composite cancer screening  Chart review shows that this patient is due/due soon for the following Pap Smear, Mammogram and Colonoscopy  Summary:    Patient is due/failing the following:   Patient is failing for many quality measures.  However she is very sick right now and will wait until she is better.     Action needed:   n/a    Type of outreach:    Phone, spoke to patient.  denied all quality fails as she is not well right now    Questions for provider review:    None                                                                                                                                    Sammie Flanagan         Chart routed to n/a .

## 2018-11-26 NOTE — TELEPHONE ENCOUNTER
Reason for Call:  Same Day Appointment, Requested Provider:  Dorian Cade MD    PCP: Dorian Cade    Reason for visit: patient is very congested, has heart issues and has COPD.  She is spitting stuff up.  She would like to talk to you.  She states she talked to the 24-hour help line and states she feels she does not need to go to the ER because she has had this before.  She asks that you give her a call.    Duration of symptoms:     Have you been treated for this in the past? Yes    Additional comments: Please call.    Can we leave a detailed message on this number? YES    Phone number patient can be reached at: Home number on file [phone] (home)    Best Time: any    Call taken on 11/26/2018 at 12:21 PM by Bernard Thmopson

## 2018-11-26 NOTE — TELEPHONE ENCOUNTER
Can you triage her and then we can give her a zpak and prednisone 20 mg a day for 5 days.  Prednisone will mess her sugars up.

## 2018-11-26 NOTE — TELEPHONE ENCOUNTER
Lovely with [facility] calling. Pt is feeling under the weather. She has congestion and sinus pressure. No cough, but is spitting up green mucus. She turned her oxygen up to 5 liters, O2 sats are 88-89 at rest, Please call pt back with your recommendation. Pt would like to avoid the ER if possible.  Thank you,  Mariah Dickson- Pt Rep.

## 2018-11-26 NOTE — TELEPHONE ENCOUNTER
Bernard Hughes is a 53 year old female who calls with chest and head congestion.      NURSING ASSESSMENT:  Description:  Chest and head congestion  Onset/duration:  3 days ago  Precip. factors:  Hx COPD and heart issues. On trilogy machine. If she just uses her O2 her sats get down to 90.  On trilogy it stays around 98-99%.    Associated symptoms:  She denies body aches, fever. Tearful.   Improves/worsens symptoms:  None.    Pain scale (0-10)   0/10  LMP/preg/breast feeding:     Last exam/Treatment:     Allergies:   Allergies   Allergen Reactions     Xanax [Alprazolam] Other (See Comments)     Agitation     Levaquin [Levofloxacin] Rash     February 2018 at [facility].         NURSING PLAN: Routed to provider Yes    RECOMMENDED DISPOSITION:  Home care advice - Sent Zpack and Prednisone as ordered to pharmacy.  Will comply with recommendation: Yes  If further questions/concerns or if symptoms do not improve, worsen or new symptoms develop, call your PCP or [facility] as soon as possible.      Guideline used:  Telephone Triage Protocols for Nurses, Fifth Edition, Reena Hughes RN

## 2018-12-03 NOTE — TELEPHONE ENCOUNTER
Last Written Prescription Date:  11/6/18  Last Fill Quantity: 30,  # refills: 0   Last office visit: 10/1/2018 with prescribing provider:       Future Office Visit:      Requested Prescriptions   Pending Prescriptions Disp Refills     HYDROcodone-acetaminophen (NORCO) 5-325 MG tablet 30 tablet 0     Sig: Take 1 tablet by mouth every 4 hours as needed for pain    There is no refill protocol information for this order        Routing refill request to provider for review/approval because:  Drug not on the [facility] refill protocol   ORLIN Morales, RN

## 2018-12-03 NOTE — TELEPHONE ENCOUNTER
Signed original RX delivered to [facility]. Stephanie,

## 2018-12-12 NOTE — TELEPHONE ENCOUNTER
Pt was advised of the of the recommendations and verbalized understanding. Rx pending for Dr. Cade to sign and send.

## 2018-12-12 NOTE — TELEPHONE ENCOUNTER
Lovely from [facility] calling. Pt was prescribed a Z pack recently for respiratory sx. She was feeling better, but now sx are back. She has a cough, yellow mucus and SOB at times. Oxygen level was 93% at rest with 4% oxygen. Please call pt with next steps. You can reach home care nurse back at [phone] with any questions.  Thank you,  Mariah Dickson- Pt Rep.

## 2018-12-14 NOTE — TELEPHONE ENCOUNTER
I have attempted to call the pt to update a PHQ-9. Pt declined filling out at this time. PHQ-9 missed. Mari Marie CMA ([facility])

## 2018-12-19 NOTE — TELEPHONE ENCOUNTER
Patient called to schedule an appointment for a hospital follow-up or appeared on a report showing that they were recently discharged from the hospital.    Patient was admitted to:  [facility]  Discharged date: 12/19/2018  Reason for hospital admission:  COPD  Does patient have future appointment scheduled with provider? Yes Dr. Cade  Date of future appointment:  12/21/2018      This information will be used to help the care team plan for the patients upcoming visit.  The triage RN may determine that a follow up call is necessary and reach out to the patient via the phone number listed in the chart.     Please route this message on routine priority to the Triage RN pool.

## 2018-12-20 NOTE — TELEPHONE ENCOUNTER
"ED/Discharge Protocol    \"Hi, my name is Kiana Llanos, a registered nurse, and I am calling on behalf of Dr. Cade's office at [place].  I am calling to follow up and see how things are going for you after your recent visit.\"    \"I see that you were in the (ER/UC/IP) on 12/114/18.    How are you doing now that you are home?\" Not too great. I still feel congested in my head and chest and have been coughing.       Discharge Instructions    \"Let's review your discharge instructions.  What is/are the follow-up recommendations?  Pt. Response: Continue home care again    \"Were you instructed to make a follow-up appointment?\"  Pt. Response: Yes.  Has appointment been made?   Yes      \"When you see the provider, I would recommend that you bring your discharge instructions with you.    Medications    \"How many new medications are you on since your hospitalization/ED visit?\"    0-1  \"How many of your current medicines changed (dose, timing, name, etc.) while you were in the hospital/ED visit?\"   0-1  \"Do you have questions about your medications?\"  YES. They said I could go to [facility] and get Vistaril, so we did and they said that's a prescription. I want Dr. Cade to prescribe Vistaril and Xanax again.   \"Were you newly diagnosed with heart failure, COPD, diabetes or did you have a heart attack?\"   No  For patients on insulin: \"Did you start on insulin in the hospital or did you have your insulin dose changed?\"   No    Medication reconciliation completed? Yes    Was MTM referral placed (*Make sure to put transitions as reason for referral)?   No    Call Summary    \"Do you have any questions or concerns about your condition or care plan at the moment?\"    No  Triage nurse advice given: Bring medications to appointment tomorrow.     Patient was in ER 1x in the past year (assess appropriateness of ER visits.)      \"If you have questions or things don't continue to improve, we encourage you contact us through the main " "clinic number,  [phone].  Even if the clinic is not open, triage nurses are available 24/7 to help you.     We would like you to know that our clinic has extended hours (provide information).  We also have urgent care (provide details on closest location and hours/contact info)\"      \"Thank you for your time and take care!\"    Kiana Llanos RN     "

## 2018-12-21 NOTE — PROGRESS NOTES
SUBJECTIVE:   Bernard Hughes is a 53 year old female who presents to clinic today for the following health issues:    Hospital Follow-up Visit:    Hospital/Nursing Home/IP Rehab Facility: [facility]  Date of Admission: 12/14/18  Date of Discharge: 12/19/18   Reason(s) for Admission: BP was 240/110, thought at first she was having a stroke, COPD just a hard time breathing, headache            Problems taking medications regularly:  None       Medication changes since discharge: None       Problems adhering to non-medication therapy:  None    Summary of hospitalization:  CareEverywhere information obtained and reviewed  Diagnostic Tests/Treatments reviewed.  Follow up needed: need basic panel  Other Healthcare Providers Involved in Patient s Care:         Homecare  Update since discharge: improved.     Post Discharge Medication Reconciliation: discharge medications reconciled and changed, per note/orders (see AVS).  Plan of care communicated with patient and family       Still not feeling good, she was in for 5 days.  bp was initially high with a headache. Also had sob.     She wasn't discharged on an antibiotic.  Prednisone is only at 20mg a day.      Homecare at home as well.      Patient continues to have shortness of breath, green sputum.  She is not feeling very strong, she is home and will resume home care.    Past Medical History:   Diagnosis Date     ASCVD (arteriosclerotic cardiovascular disease)     3 vessel bypass 3/2017     Congestive heart failure (H)      COPD (chronic obstructive pulmonary disease) (H)      Coronary artery disease     h/o coronary stents x 2     Depressive disorder      Diabetes (H)      History of blood transfusion     1985     Hypertension      Kidney disease      Current Outpatient Medications   Medication     ALPRAZolam (XANAX) 0.5 MG tablet     amLODIPine (NORVASC) 5 MG tablet     aspirin 81 MG EC tablet     blood glucose monitoring (NO BRAND SPECIFIED) test strip     carvedilol  (COREG) 25 MG tablet     cefuroxime (CEFTIN) 250 MG tablet     cetirizine (ZYRTEC) 10 MG tablet     FLUoxetine (PROZAC) 20 MG capsule     fluticasone-salmeterol (ADVAIR) 100-50 MCG/DOSE diskus inhaler     furosemide (LASIX) 40 MG tablet     glyBURIDE (DIABETA /MICRONASE) 5 MG tablet     guaiFENesin (MUCINEX) 600 MG 12 hr tablet     HYDROcodone-acetaminophen (NORCO) 5-325 MG tablet     hydrOXYzine (VISTARIL) 25 MG capsule     insulin glargine (BASAGLAR KWIKPEN) 100 UNIT/ML pen     insulin pen needle (BD MYRIAM U/F) 32G X 4 MM     ipratropium - albuterol 0.5 mg/2.5 mg/3 mL (DUONEB) 0.5-2.5 (3) MG/3ML neb solution     Isosorbide Mononitrate  MG TB24     nitroGLYcerin (NITROSTAT) 0.4 MG sublingual tablet     nystatin (MYCOSTATIN) 534837 UNIT/GM POWD     order for DME     predniSONE (DELTASONE) 10 MG tablet     predniSONE (DELTASONE) 20 MG tablet     traZODone (DESYREL) 50 MG tablet     umeclidinium (INCRUSE ELLIPTA) 62.5 MCG/INH oral inhaler     VENTOLIN  (90 BASE) MCG/ACT Inhaler     atorvastatin (LIPITOR) 80 MG tablet     No current facility-administered medications for this visit.      Social History     Tobacco Use     Smoking status: Former Smoker     Packs/day: 1.00     Years: 30.00     Pack years: 30.00     Last attempt to quit: 2016     Years since quittin.9     Smokeless tobacco: Never Used   Substance Use Topics     Alcohol use: No     Alcohol/week: 0.0 oz     Frequency: Never     Drug use: Yes     Types: Marijuana     Comment: quit in her 40s     Review of Systems  Constitutional-No fevers, chills, or weight changes..  ENT-No earpain, sore throat, voice changes or rhinitis.  Cardiac-Exertional SOB.  Respiratory-Sputum production and SOB.  GI-No nausea, vomitting, diarrhea, constipation, or blood in the stool.  Musculoskeletal-No muscles aches or joint pains.    Physical Exam  /72 (BP Location: Left arm, Patient Position: Chair, Cuff Size: Adult Regular)   Pulse 71   Temp 97.4  F  "(36.3  C) (Temporal)   Resp 16   Ht 1.575 m (5' 2\")   Wt 94.3 kg (207 lb 12.8 oz)   SpO2 (!) 85%   BMI 38.01 kg/m    General Appearance-alert, moderate distress  Cardiac-regular rate and rhythm  with normal S1, S2 ; no murmur, rub or gallops  Lungs-moderate to severe decreased air movement  Extremities-no peripheral edema, peripheral pulses normal    ASSESSMENT:  This is a 53-year-old woman who has a long history of COPD and pulmonary disease, she was in the hospital for 4-5 days his first had some high blood pressure and headaches she did not have a stroke apparently her blood pressure is better on her regular medications I did not increase her amlodipine.  She does continue a shortness of breath and COPD I think she has probably of upper respiratory viral illness with a lot of runny nose but she is so high risk, and I put her on antibiotics of Ceftin 250 twice a day because she had some acute renal failure.  Also can increase her steroids to 40 mg a day for the next 5 days.  Hopefully she can stay at home with home care and her family.  She does have home oxygen therapy but if she is not getting better she will need to go back to [facility]'s emergency room.    I will check her renal function today and adjust her medications including possibly lowering her Lasix.  Also, check a chest x-ray her left side had some decreased breath sounds I want to make sure she did not have a pleural effusion or pneumonia there.  Patient agrees with this plan and hopefully she will be better, I do recommend that she have family with her for the weekend.    Electronically signed by Dorian Cade MD    "

## 2018-12-21 NOTE — PROGRESS NOTES
Appropriate assistive devices provided during their visit. walker (Yes, No, N/A) walker (list device)    Exam table and/or cart  placed in the lowest position. yes (Yes, No, N/A)    Brakes on tables/carts/wheelchairs used at all times. yes (Yes, No, N/A)    Non slip footwear applied. No shoes on (Yes, No, NA)    Patient was accompanied by staff throughout visit. yes (Yes, No, N/A)    Equipment safety straps used. na (Yes, No, N/A)    Assist with toileting. na (Yes, No, N/A)

## 2018-12-21 NOTE — TELEPHONE ENCOUNTER
Reason for Call:  Home Health Care    Lisa with [facility] called regarding (reason for call): needing a verbal order to resume home care services on 12/22. Bernard was in the hospital with COPD complications prior.   Orders are needed for this patient.     PT:      OT:      Skilled Nursing:      Pt Provider: Dorian Cade     Phone Number Homecare Nurse can be reached at:      Can we leave a detailed message on this number? YES    Phone number patient can be reached at: Other phone number:   [phone]    Best Time:      Call taken on 12/21/2018 at 11:14 AM by Viki Hughes

## 2018-12-24 NOTE — TELEPHONE ENCOUNTER
Reason for Call: Request for an order or referral:    Order or referral being requested: Home care skilled nursing 3 times a week for 2 weeks, 2 times a week for 1 week, PT and OT eval and treat.     Date needed: as soon as possible    Has the patient been seen by the PCP for this problem? YES    Additional comments: Is aware that PCP is not in the office. Is ok waiting for his return.     Phone number Patient can be reached at:  Other phone number:  [phone] [facility]    Best Time:  any    Can we leave a detailed message on this number?  YES    Call taken on 12/24/2018 at 7:23 AM by Brandy Lawson

## 2018-12-26 NOTE — TELEPHONE ENCOUNTER
Tired the return call back number few times, keeps staying the number isn't a working a number. Unable to reach HC nurse. If HC nurse calls back please relay message from Dr. Cade.

## 2018-12-28 NOTE — TELEPHONE ENCOUNTER
Ariadna is calling from the [facility]. She states they won't admit her and she doesn't feel like she should be sent home.      She was hospitalized with pneumonia 12/14-12/19.  Today she states her legs are both very swollen and she feels like she still has pneumonia. Her bp was 210/110 when the EMT came to her home today.    A CT was done today and they told her she was fine.       Ariadna is crying on the phone and wants to know what Dr Cade wants her to do.     RN advised that if Ariadna is uncomfortable going home and feels she is that sick she should come to the ED in [place] for re-evaluation.     Forwarding to PCP as an FYI.

## 2018-12-31 NOTE — PROGRESS NOTES
SUBJECTIVE:   Bernard Hughes is a 53 year old female who presents to clinic today for the following health issues:    ED/UC Followup:    Facility:  [facility]  Date of visit: 12/28/18  Reason for visit: SOB  Current Status: Follow up     Patient is here for follow-up of [facility] emergency room visit last week.  She went in with high blood pressure, anxiety, shortness of breath.  She is already on Ceftin and prednisone.  Her oxygen levels were stable.  Her chest CT was normal.  She was not admitted and this upset her.  She does not have a lot of help at home.  She continues to feel frustrated and tired.  She would like this to get better but she does understand this diagnosis most improved.  She continues to be short of breath at times but is able to walk into the appointment today.  She does have some cough and sputum but no fevers.    Past Medical History:   Diagnosis Date     ASCVD (arteriosclerotic cardiovascular disease)     3 vessel bypass 3/2017     Congestive heart failure (H)      COPD (chronic obstructive pulmonary disease) (H)      Coronary artery disease     h/o coronary stents x 2     Depressive disorder      Diabetes (H)      History of blood transfusion     1985     Hypertension      Kidney disease      Current Outpatient Medications   Medication     ALPRAZolam (XANAX) 0.5 MG tablet     amLODIPine (NORVASC) 5 MG tablet     amoxicillin-clavulanate (AUGMENTIN) 875-125 MG tablet     aspirin 81 MG EC tablet     cefuroxime (CEFTIN) 250 MG tablet     cetirizine (ZYRTEC) 10 MG tablet     fluconazole (DIFLUCAN) 150 MG tablet     FLUoxetine (PROZAC) 20 MG capsule     fluticasone-salmeterol (ADVAIR) 100-50 MCG/DOSE diskus inhaler     furosemide (LASIX) 40 MG tablet     glyBURIDE (DIABETA /MICRONASE) 5 MG tablet     guaiFENesin (MUCINEX) 600 MG 12 hr tablet     HYDROcodone-acetaminophen (NORCO) 5-325 MG tablet     hydrOXYzine (VISTARIL) 25 MG capsule     insulin glargine (BASAGLAR KWIKPEN) 100 UNIT/ML pen      ipratropium - albuterol 0.5 mg/2.5 mg/3 mL (DUONEB) 0.5-2.5 (3) MG/3ML neb solution     Isosorbide Mononitrate  MG TB24     nystatin (MYCOSTATIN) 447583 UNIT/GM POWD     predniSONE (DELTASONE) 10 MG tablet     predniSONE (DELTASONE) 20 MG tablet     traZODone (DESYREL) 50 MG tablet     umeclidinium (INCRUSE ELLIPTA) 62.5 MCG/INH oral inhaler     VENTOLIN  (90 BASE) MCG/ACT Inhaler     atorvastatin (LIPITOR) 80 MG tablet     blood glucose monitoring (NO BRAND SPECIFIED) test strip     carvedilol (COREG) 25 MG tablet     insulin pen needle (BD MYRIAM U/F) 32G X 4 MM     nitroGLYcerin (NITROSTAT) 0.4 MG sublingual tablet     order for DME     No current facility-administered medications for this visit.      Social History     Tobacco Use     Smoking status: Former Smoker     Packs/day: 1.00     Years: 30.00     Pack years: 30.00     Last attempt to quit: 2016     Years since quittin.0     Smokeless tobacco: Never Used   Substance Use Topics     Alcohol use: No     Alcohol/week: 0.0 oz     Frequency: Never     Drug use: Yes     Types: Marijuana     Comment: quit in her 40s     Review of Systems  Constitutional-No fevers, chills, or weight changes..  ENT-No earpain, sore throat, voice changes or rhinitis.  Cardiac-No chest pain or palpitations and Exertional SOB.  Respiratory-Sputum production and SOB.  GI-No nausea, vomitting, diarrhea, constipation, or blood in the stool.    Physical Exam  /84   Pulse 80   Temp 99.1  F (37.3  C) (Temporal)   Resp 16   Wt 95.3 kg (210 lb)   SpO2 95%   BMI 38.41 kg/m    General Appearance-healthy, alert, no distress, actually looks better then last week.  Cardiac-regular rate and rhythm  with normal S1, S2 ; no murmur, rub or gallops  Lungs-mild to moderate decreased air movement and mild to moderate expiratory rhonchi  Extremities-1+ pitting edema in the ankles.     ASSESSMENT:  53-year-old woman who has severe COPD, home oxygen, home try a trilogy  machine.  She is on the appropriate inhalers.  10 days ago we saw her and put her on Ceftin and prednisone.  She continues to be short of breath.  I am going to change her over to Augmentin 875 twice a day she is warned about diarrhea.  We will give her another 7 days of prednisone at 20 mg.  She does have home care already set up.  She has home oxygen.  Her oxygen saturations are 95% so she will return home today.    She has a little bit of peripheral edema and she has had some congestive heart failure in the past.  We will increase her Lasix to 2 a day for the next 2 days but we do not want to cause her over dehydration.    She does have coronary artery disease and did have some disease in her RCA which possibly is going to be stented in the future.  We will have her go back to see her cardiologist to talk about this with her fatigue however she not having classical angina symptoms    If the patient continues to get worse or cannot be at home she will need to go to the emergency room to be evaluated but at this time she is stable with her vitals especially her oxygen saturation, and definitely looking better than on the 21st.  Electronically signed by Dorian Cade MD

## 2019-01-01 ENCOUNTER — MEDICAL CORRESPONDENCE (OUTPATIENT)
Dept: HEALTH INFORMATION MANAGEMENT | Facility: CLINIC | Age: 54
End: 2019-01-01

## 2019-01-01 ENCOUNTER — TELEPHONE (OUTPATIENT)
Dept: INTERNAL MEDICINE | Facility: CLINIC | Age: 54
End: 2019-01-01

## 2019-01-01 ENCOUNTER — TRANSFERRED RECORDS (OUTPATIENT)
Dept: HEALTH INFORMATION MANAGEMENT | Facility: CLINIC | Age: 54
End: 2019-01-01

## 2019-01-01 ENCOUNTER — OFFICE VISIT (OUTPATIENT)
Dept: INTERNAL MEDICINE | Facility: CLINIC | Age: 54
End: 2019-01-01
Payer: COMMERCIAL

## 2019-01-01 VITALS
BODY MASS INDEX: 38.41 KG/M2 | HEART RATE: 74 BPM | DIASTOLIC BLOOD PRESSURE: 74 MMHG | OXYGEN SATURATION: 97 % | RESPIRATION RATE: 16 BRPM | WEIGHT: 210 LBS | SYSTOLIC BLOOD PRESSURE: 148 MMHG | TEMPERATURE: 97.9 F

## 2019-01-01 VITALS
HEIGHT: 62 IN | WEIGHT: 200 LBS | BODY MASS INDEX: 36.8 KG/M2 | HEART RATE: 68 BPM | RESPIRATION RATE: 16 BRPM | OXYGEN SATURATION: 97 % | TEMPERATURE: 98 F | DIASTOLIC BLOOD PRESSURE: 66 MMHG | SYSTOLIC BLOOD PRESSURE: 128 MMHG

## 2019-01-01 DIAGNOSIS — F41.9 ANXIETY: ICD-10-CM

## 2019-01-01 DIAGNOSIS — E11.65 TYPE 2 DIABETES MELLITUS WITH HYPERGLYCEMIA, WITHOUT LONG-TERM CURRENT USE OF INSULIN (H): ICD-10-CM

## 2019-01-01 DIAGNOSIS — I10 ESSENTIAL HYPERTENSION WITH GOAL BLOOD PRESSURE LESS THAN 140/90: ICD-10-CM

## 2019-01-01 DIAGNOSIS — D50.9 IRON DEFICIENCY ANEMIA, UNSPECIFIED IRON DEFICIENCY ANEMIA TYPE: ICD-10-CM

## 2019-01-01 DIAGNOSIS — J44.1 CHRONIC OBSTRUCTIVE PULMONARY DISEASE WITH ACUTE EXACERBATION (H): Primary | ICD-10-CM

## 2019-01-01 DIAGNOSIS — K81.1 CHRONIC CHOLECYSTITIS: ICD-10-CM

## 2019-01-01 DIAGNOSIS — F43.22 ADJUSTMENT DISORDER WITH ANXIOUS MOOD: ICD-10-CM

## 2019-01-01 DIAGNOSIS — E11.65 TYPE 2 DIABETES MELLITUS WITH HYPERGLYCEMIA, WITHOUT LONG-TERM CURRENT USE OF INSULIN (H): Primary | ICD-10-CM

## 2019-01-01 DIAGNOSIS — I82.4Z9 ACUTE DEEP VEIN THROMBOSIS (DVT) OF DISTAL VEIN OF LOWER EXTREMITY, UNSPECIFIED LATERALITY (H): ICD-10-CM

## 2019-01-01 DIAGNOSIS — J44.9 CHRONIC OBSTRUCTIVE PULMONARY DISEASE, UNSPECIFIED COPD TYPE (H): ICD-10-CM

## 2019-01-01 DIAGNOSIS — I50.33 ACUTE ON CHRONIC DIASTOLIC HEART FAILURE (H): ICD-10-CM

## 2019-01-01 DIAGNOSIS — R80.9 PROTEINURIA: ICD-10-CM

## 2019-01-01 DIAGNOSIS — N18.2 CHRONIC KIDNEY DISEASE, STAGE II (MILD): Primary | ICD-10-CM

## 2019-01-01 DIAGNOSIS — E78.5 HYPERLIPIDEMIA LDL GOAL <100: ICD-10-CM

## 2019-01-01 DIAGNOSIS — E66.01 MORBID OBESITY (H): ICD-10-CM

## 2019-01-01 DIAGNOSIS — I10 BENIGN HYPERTENSION: ICD-10-CM

## 2019-01-01 DIAGNOSIS — J44.9 CHRONIC OBSTRUCTIVE PULMONARY DISEASE, UNSPECIFIED COPD TYPE (H): Primary | ICD-10-CM

## 2019-01-01 DIAGNOSIS — D64.9 ANEMIA, UNSPECIFIED TYPE: ICD-10-CM

## 2019-01-01 DIAGNOSIS — E55.9 VITAMIN D DEFICIENCY, UNSPECIFIED: ICD-10-CM

## 2019-01-01 DIAGNOSIS — J44.9 COPD (CHRONIC OBSTRUCTIVE PULMONARY DISEASE) (H): Primary | ICD-10-CM

## 2019-01-01 LAB
ANION GAP SERPL CALCULATED.3IONS-SCNC: 4 MMOL/L (ref 3–14)
ANION GAP SERPL CALCULATED.3IONS-SCNC: 4 MMOL/L (ref 3–14)
BUN SERPL-MCNC: 19 MG/DL (ref 7–30)
BUN SERPL-MCNC: 47 MG/DL (ref 7–30)
CALCIUM SERPL-MCNC: 9 MG/DL (ref 8.5–10.1)
CALCIUM SERPL-MCNC: 9.2 MG/DL (ref 8.5–10.1)
CHLORIDE SERPL-SCNC: 96 MMOL/L (ref 94–109)
CHLORIDE SERPL-SCNC: 98 MMOL/L (ref 94–109)
CO2 SERPL-SCNC: 36 MMOL/L (ref 20–32)
CO2 SERPL-SCNC: 37 MMOL/L (ref 20–32)
CREAT SERPL-MCNC: 1.41 MG/DL (ref 0.52–1.04)
CREAT SERPL-MCNC: 1.56 MG/DL (ref 0.52–1.04)
CREAT SERPL-MCNC: 2.07 MG/DL (ref 0.57–1.11)
ERYTHROCYTE [DISTWIDTH] IN BLOOD BY AUTOMATED COUNT: 18.9 % (ref 10–15)
GFR SERPL CREATININE-BSD FRML MDRD: 25 ML/MIN/1.73M2
GFR SERPL CREATININE-BSD FRML MDRD: 37 ML/MIN/{1.73_M2}
GFR SERPL CREATININE-BSD FRML MDRD: 42 ML/MIN/{1.73_M2}
GLUCOSE SERPL-MCNC: 241 MG/DL (ref 65–100)
GLUCOSE SERPL-MCNC: 269 MG/DL (ref 70–99)
GLUCOSE SERPL-MCNC: 278 MG/DL (ref 70–99)
HBA1C MFR BLD: 9.2 % (ref 0–5.6)
HBA1C MFR BLD: 9.6 % (ref 0–5.7)
HCT VFR BLD AUTO: 29.9 % (ref 35–47)
HGB BLD-MCNC: 8.9 G/DL (ref 11.7–15.7)
HGB BLD-MCNC: 9.6 G/DL (ref 11.7–15.7)
MCH RBC QN AUTO: 26.6 PG (ref 26.5–33)
MCHC RBC AUTO-ENTMCNC: 29.8 G/DL (ref 31.5–36.5)
MCV RBC AUTO: 90 FL (ref 78–100)
PLATELET # BLD AUTO: 238 10E9/L (ref 150–450)
POTASSIUM SERPL-SCNC: 3.9 MMOL/L (ref 3.5–5)
POTASSIUM SERPL-SCNC: 4 MMOL/L (ref 3.4–5.3)
POTASSIUM SERPL-SCNC: 4.1 MMOL/L (ref 3.4–5.3)
RBC # BLD AUTO: 3.34 10E12/L (ref 3.8–5.2)
SODIUM SERPL-SCNC: 136 MMOL/L (ref 133–144)
SODIUM SERPL-SCNC: 139 MMOL/L (ref 133–144)
WBC # BLD AUTO: 4.5 10E9/L (ref 4–11)

## 2019-01-01 PROCEDURE — 80048 BASIC METABOLIC PNL TOTAL CA: CPT | Performed by: INTERNAL MEDICINE

## 2019-01-01 PROCEDURE — 83036 HEMOGLOBIN GLYCOSYLATED A1C: CPT | Performed by: INTERNAL MEDICINE

## 2019-01-01 PROCEDURE — 85027 COMPLETE CBC AUTOMATED: CPT | Performed by: INTERNAL MEDICINE

## 2019-01-01 PROCEDURE — 36415 COLL VENOUS BLD VENIPUNCTURE: CPT | Performed by: INTERNAL MEDICINE

## 2019-01-01 PROCEDURE — 99496 TRANSJ CARE MGMT HIGH F2F 7D: CPT | Performed by: INTERNAL MEDICINE

## 2019-01-01 PROCEDURE — 99214 OFFICE O/P EST MOD 30 MIN: CPT | Performed by: INTERNAL MEDICINE

## 2019-01-01 PROCEDURE — 85018 HEMOGLOBIN: CPT | Performed by: INTERNAL MEDICINE

## 2019-01-01 RX ORDER — ALPRAZOLAM 0.5 MG
0.5 TABLET ORAL 3 TIMES DAILY PRN
Qty: 30 TABLET | Refills: 0 | Status: SHIPPED | OUTPATIENT
Start: 2019-01-01 | End: 2019-01-01

## 2019-01-01 RX ORDER — INSULIN LISPRO 100 [IU]/ML
20 INJECTION, SOLUTION INTRAVENOUS; SUBCUTANEOUS
Qty: 9 ML | Refills: 3 | Status: SHIPPED | OUTPATIENT
Start: 2019-01-01 | End: 2019-01-01

## 2019-01-01 RX ORDER — APIXABAN 5 MG/1
TABLET, FILM COATED ORAL
Qty: 90 TABLET | Refills: 1 | Status: SHIPPED | OUTPATIENT
Start: 2019-01-01

## 2019-01-01 RX ORDER — AZITHROMYCIN 250 MG/1
TABLET, FILM COATED ORAL DAILY
COMMUNITY

## 2019-01-01 RX ORDER — INSULIN LISPRO 100 [IU]/ML
6 INJECTION, SOLUTION INTRAVENOUS; SUBCUTANEOUS
Qty: 9 ML | Refills: 3 | Status: SHIPPED | OUTPATIENT
Start: 2019-01-01

## 2019-01-01 RX ORDER — CARVEDILOL 12.5 MG/1
12.5 TABLET ORAL 2 TIMES DAILY WITH MEALS
COMMUNITY

## 2019-01-01 RX ORDER — ATORVASTATIN CALCIUM 40 MG/1
40 TABLET, FILM COATED ORAL DAILY
Qty: 90 TABLET | Refills: 1 | Status: SHIPPED | OUTPATIENT
Start: 2019-01-01

## 2019-01-01 RX ORDER — ATORVASTATIN CALCIUM 40 MG/1
40 TABLET, FILM COATED ORAL DAILY
COMMUNITY
End: 2019-01-01

## 2019-01-01 RX ORDER — FERROUS SULFATE 325(65) MG
325 TABLET, DELAYED RELEASE (ENTERIC COATED) ORAL 2 TIMES DAILY
COMMUNITY

## 2019-01-01 RX ORDER — GLYBURIDE 5 MG/1
TABLET ORAL
Qty: 180 TABLET | Refills: 1 | Status: SHIPPED | OUTPATIENT
Start: 2019-01-01

## 2019-01-01 RX ORDER — INSULIN GLARGINE 100 [IU]/ML
30 INJECTION, SOLUTION SUBCUTANEOUS DAILY
Qty: 15 ML | Refills: 1 | Status: SHIPPED | OUTPATIENT
Start: 2019-01-01

## 2019-01-01 RX ORDER — INSULIN LISPRO 100 [IU]/ML
INJECTION, SOLUTION INTRAVENOUS; SUBCUTANEOUS
COMMUNITY
Start: 2019-01-01 | End: 2019-01-01

## 2019-01-01 RX ORDER — FUROSEMIDE 40 MG
TABLET ORAL
Qty: 30 TABLET | Refills: 1 | Status: SHIPPED | OUTPATIENT
Start: 2019-01-01 | End: 2019-01-01

## 2019-01-01 RX ORDER — HYDROCODONE BITARTRATE AND ACETAMINOPHEN 5; 325 MG/1; MG/1
1 TABLET ORAL EVERY 4 HOURS PRN
Qty: 30 TABLET | Refills: 0 | Status: SHIPPED | OUTPATIENT
Start: 2019-01-01

## 2019-01-01 RX ORDER — HYDROCODONE BITARTRATE AND ACETAMINOPHEN 5; 325 MG/1; MG/1
1 TABLET ORAL EVERY 4 HOURS PRN
Qty: 30 TABLET | Refills: 0 | Status: SHIPPED | OUTPATIENT
Start: 2019-01-01 | End: 2019-01-01

## 2019-01-01 RX ORDER — FUROSEMIDE 40 MG
TABLET ORAL
Qty: 30 TABLET | Refills: 1 | COMMUNITY
Start: 2019-01-01 | End: 2019-01-01

## 2019-01-01 RX ORDER — PREDNISONE 10 MG/1
TABLET ORAL
Qty: 20 TABLET | Refills: 0 | Status: SHIPPED | OUTPATIENT
Start: 2019-01-01 | End: 2019-01-01

## 2019-01-01 RX ORDER — FUROSEMIDE 40 MG
TABLET ORAL
Qty: 30 TABLET | Refills: 10 | Status: SHIPPED | OUTPATIENT
Start: 2019-01-01

## 2019-01-01 RX ORDER — ALPRAZOLAM 0.5 MG
0.5 TABLET ORAL 3 TIMES DAILY PRN
Qty: 30 TABLET | Refills: 0 | Status: SHIPPED | OUTPATIENT
Start: 2019-01-01

## 2019-01-01 ASSESSMENT — MIFFLIN-ST. JEOR: SCORE: 1465.44

## 2019-01-01 ASSESSMENT — PAIN SCALES - GENERAL
PAINLEVEL: NO PAIN (0)
PAINLEVEL: MODERATE PAIN (4)

## 2019-01-01 ASSESSMENT — PATIENT HEALTH QUESTIONNAIRE - PHQ9: SUM OF ALL RESPONSES TO PHQ QUESTIONS 1-9: 12

## 2019-01-04 NOTE — TELEPHONE ENCOUNTER
Ariadna, a home physical therapist is calling with concerns for Bernard today.  She states that this am when she arrived Bernard was disorientated, confused, and week. She had a fall while the therapist was her.  Patient reported another fall this am and said her o2 level was 75%.    Physical therapist states currently sats at 96% Patient rating headache at 6/10. Still somewhat disorientated. .    Allergies:   Allergies   Allergen Reactions     Xanax [Alprazolam] Other (See Comments)     Agitation     Levaquin [Levofloxacin] Rash     February 2018 at [facility].         RECOMMENDED DISPOSITION:  To ED, another person to drive   Will comply with recommendation: Yes  If further questions/concerns or if symptoms do not improve, worsen or new symptoms develop, call your PCP or [facility] as soon as possible.      Guideline used:  Telephone Triage Protocols for Nurses, Fifth Edition, Reena Hughes RN

## 2019-01-09 NOTE — TELEPHONE ENCOUNTER
Patient called to schedule an appointment for a hospital follow-up or appeared on a report showing that they were recently discharged from the hospital.    Patient was admitted to [facility]  Discharged date: 1/9/2019  Reason for hospital admission:  COPD and shortness of breath  Does patient have future appointment scheduled with provider? Yes Dr. Cade  Date of future appointment:  1/14/19      This information will be used to help the care team plan for the patients upcoming visit.  The triage RN may determine that a follow up call is necessary and reach out to the patient via the phone number listed in the chart.     Please route this message on routine priority to the Triage RN pool.

## 2019-01-10 NOTE — TELEPHONE ENCOUNTER
"Hospital/TCU/ED for chronic condition Discharge Protocol    \"Hi, my name is Josie Harrison, a registered nurse, and I am calling from [facility].  I am calling to follow up and see how things are going for you after your recent emergency visit/hospital/TCU stay.\"    Tell me how you are doing now that you are home?\" Patient states ehr foot is swollen but that is because she did not have it elevated.  She is doing that right now.      Discharge Instructions    \"Let's review your discharge instructions.  What is/are the follow-up recommendations?  Pt. Response: Patient has an appointment on Monday, 1/14/19    \"Has an appointment with your primary care provider been scheduled?\"   Yes. (confirm)    \"When you see the provider, I would recommend that you bring your medications with you.\"    Medications    \"Tell me what changed about your medicines when you discharged?\"    Changes to chronic meds?    Patient states it is too complicated to discuss over the phone and would prefer to bring her new list in and have PCP look at it     \"What questions do you have about your medications?\"    None     New diagnoses of heart failure, COPD, diabetes, or MI?    No     On insulin: \"Did you start on insulin in the hospital or did you have your insulin dose changed?\"  No         Medication reconciliation completed? No, due to see above  Was MTM referral placed (*Make sure to put transitions as reason for referral)?   No    Call Summary    \"What questions or concerns do you have about your recent visit and your follow-up care?\"     none    \"If you have questions or things don't continue to improve, we encourage you contact us through the main clinic number (give number).  Even if the clinic is not open, triage nurses are available 24/7 to help you.     We would like you to know that our clinic has extended hours (provide information).  We also have urgent care (provide details on closest location and hours/contact info)\"      \"Thank " "you for your time and take care!\"     Josie Harrison, BSN, RN           "

## 2019-01-14 NOTE — PROGRESS NOTES
SUBJECTIVE:   Bernard Hughes is a 53 year old female who presents to clinic today for the following health issues:    Hospital Follow-up Visit:    Hospital/Nursing Home/IP Rehab Facility: [facility]  Date of Admission: 1/4/19  Date of Discharge: 1/9/19  Reason(s) for Admission: COPD  Acute Respiratory Failure            Problems taking medications regularly:  None       Medication changes since discharge: None       Problems adhering to non-medication therapy:  None    Summary of hospitalization:  CareEverywhere information obtained and reviewed  Diagnostic Tests/Treatments reviewed.  Follow up needed: none  Other Healthcare Providers Involved in Patient s Care:         Homecare  Update since discharge: improved.     Post Discharge Medication Reconciliation: discharge medications reconciled and changed, per note/orders (see AVS).  Plan of care communicated with patient        She was hospitalized with copd and chf exacerbation.  Right leg was swollen and had a DVT.  Had echo with EF of 70% and diastolic dysfunction.    She had diuresis in the hospital.  Eating her fat and carb in take.     Ultrasound showed a DVT and treated with eliquis.  5 mg bid.      Feeling better, weight is down 12 pounds.      They cut her coreg in half 12.5 mg bid,     Prednisone 40 mg still then going to 20mg Wed,     Sugars have been high 500 range at times.  20 units of glargine, will go to 25 units.    Past Medical History:   Diagnosis Date     ASCVD (arteriosclerotic cardiovascular disease)     3 vessel bypass 3/2017     Congestive heart failure (H)      COPD (chronic obstructive pulmonary disease) (H)      Coronary artery disease     h/o coronary stents x 2     Depressive disorder      Diabetes (H)      History of blood transfusion     1985     Hypertension      Kidney disease      Current Outpatient Medications   Medication     ALPRAZolam (XANAX) 0.5 MG tablet     apixaban ANTICOAGULANT (ELIQUIS) 5 MG tablet     aspirin 81 MG EC  tablet     atorvastatin (LIPITOR) 40 MG tablet     carvedilol (COREG) 12.5 MG tablet     cetirizine (ZYRTEC) 10 MG tablet     ferrous sulfate (FE TABS) 325 (65 Fe) MG EC tablet     FLUoxetine (PROZAC) 20 MG capsule     fluticasone-salmeterol (ADVAIR) 100-50 MCG/DOSE diskus inhaler     furosemide (LASIX) 40 MG tablet     glyBURIDE (DIABETA /MICRONASE) 5 MG tablet     guaiFENesin (MUCINEX) 600 MG 12 hr tablet     HYDROcodone-acetaminophen (NORCO) 5-325 MG tablet     insulin glargine (BASAGLAR KWIKPEN) 100 UNIT/ML pen     insulin pen needle (BD MYRIAM U/F) 32G X 4 MM     ipratropium - albuterol 0.5 mg/2.5 mg/3 mL (DUONEB) 0.5-2.5 (3) MG/3ML neb solution     Isosorbide Mononitrate  MG TB24     predniSONE (DELTASONE) 10 MG tablet     traZODone (DESYREL) 50 MG tablet     umeclidinium (INCRUSE ELLIPTA) 62.5 MCG/INH oral inhaler     VENTOLIN  (90 BASE) MCG/ACT Inhaler     blood glucose monitoring (NO BRAND SPECIFIED) test strip     hydrOXYzine (VISTARIL) 25 MG capsule     nitroGLYcerin (NITROSTAT) 0.4 MG sublingual tablet     nystatin (MYCOSTATIN) 373766 UNIT/GM POWD     order for DME     predniSONE (DELTASONE) 10 MG tablet     No current facility-administered medications for this visit.      Social History     Tobacco Use     Smoking status: Former Smoker     Packs/day: 1.00     Years: 30.00     Pack years: 30.00     Last attempt to quit: 2016     Years since quittin.0     Smokeless tobacco: Never Used   Substance Use Topics     Alcohol use: No     Alcohol/week: 0.0 oz     Frequency: Never     Drug use: Yes     Types: Marijuana     Comment: quit in her 40s     Review of Systems  Constitutional-No fevers, chills,   Weight loss..  ENT-No earpain, sore throat, voice changes or rhinitis.  Cardiac-No chest pain or palpitations and Exertional SOB.  Respiratory-No cough, sob, or hemoptysis and SOB.  GI-No nausea, vomitting, diarrhea, constipation, or blood in the stool.  Endocrine-Weight loss and sugars are  "elevated.    Physical Exam  /66   Pulse 68   Temp 98  F (36.7  C) (Temporal)   Resp 16   Ht 1.575 m (5' 2\")   Wt 90.7 kg (200 lb)   SpO2 97%   BMI 36.58 kg/m    General Appearance-alert, no distress  Cardiac-regular rate and rhythm  with normal S1, S2 ; no murmur, rub or gallops  Lungs-mild to moderate decreased air movement  Extremities-trace edema in her ankles.    ASSESSMENT:  Patient is a 53-year-old woman who has end-stage COPD, congestive heart failure, hypertension, diabetes.  She has recently been in the clinic in emergency room.  She was then in the hospital for 4-5-day stay.  She was diuresed nicely.  She continues on increased diuretics.  We will check her kidney function.  With diuresis she did go into some acute on chronic kidney failure with a creatinine going up to about 2.  On recheck her creatinine is down to 1.56 she is stable on her current diuretics and monitoring her weight.  She has follow-up with nephrology in the near future.    Her COPD is stable we will wean her down off her steroids over 2-week period.  She is also finishing her antibiotics.  She continues on her home oxygen and other inhalers.    We did discuss hospice with a diagnosis of COPD.  She is very upset and tearful about this.  I think after discussion she was more aware of what hospice is and that does not that we are giving up on her but rather were changing to more comfort measures and they agree instead of survival measures.  I think this would make her more comfortable allow her to stay in her house more provide her more care and opportunities.  For now she will continue home care she will discuss with her home care nurse hospice she will discuss this with her  and we may pursue this in the near future.    Would like to see the patient back in 1 month.  We can rediscuss hospice and how her COPD and CHF are doing.    Anemia the patient did have an anemia with a hemoglobin down to 8 this is anemia of " chronic disease.  We will recheck her hemoglobin in the clinic and it was up to 8.9.    Congestive heart failure the patient has had a history of heart disease she is on diuretics doing well those were increased, she is off amlodipine having less peripheral edema.  Blood pressure was stable.    Diabetes-the patient is stable she is on insulin with daily insulin and with meals, she will continue this.  Her sugars are elevated due to the prednisone as we increased her insulin this may need to go back down as the prednisone taper is off.    DVT-this was found in her lower extremity below the knee but she has high risk if she had a PE so we will continue her on Eliquis anticoagulation.  She will need this anticoagulation for at least 3 months and then continue or discuss stopping at that time.    Electronically signed by Dorian Cade MD

## 2019-01-21 NOTE — TELEPHONE ENCOUNTER
Reason for Call:  Home Health Care    [facility] home [facility] therapy called regarding (reason for call): verbal order    Orders are needed for this patient.     PT:  Requesting a verbal order to continue home physical therapy. Two times a week for three weeks for gait, transfers, balance, strength, and home exercise.     OT:     Skilled Nursing:      Pt Provider: Rayo     Phone Number Home care Nurse can be reached at:      Can we leave a detailed message on this number? YES    Phone number patient can be reached at: Other phone number:       Best Time:      Call taken on 1/21/2019 at 8:58 AM by Viki Hughes

## 2019-02-06 NOTE — TELEPHONE ENCOUNTER
Norco 5-325 MG       Last Written Prescription Date:  12/21/18  Last Fill Quantity: 30,   # refills: 0  Last Office Visit: 1/14/19  Future Office visit:       Routing refill request to provider for review/approval because:  Drug not on the [facility], [facility] or [facility] refill protocol or controlled substance  Alprazolam  0.5 MG      Last Written Prescription Date:  12/21/18  Last Fill Quantity: 30,   # refills: 0  Last Office Visit: 1/14/19  Future Office visit:       Routing refill request to provider for review/approval because:  Drug not on the [facility], [facility] or [facility] refill protocol or controlled substance

## 2019-02-08 NOTE — TELEPHONE ENCOUNTER
Left message with orders on Agnes'a voice mail.  
Ok for orders    
Reason for Call: Request for an order or referral:    Order or referral being requested: for home care physical theraphy    Date needed: as soon as possible    Has the patient been seen by the PCP for this problem? YES    Additional comments: Agnes calling from [facility] would like to extent her physical therapy orders for 2 times a week for 2 weeks     Phone number Patient can be reached at:  Other phone number:  [phone]    Best Time:  any    Can we leave a detailed message on this number?  YES    Call taken on 2/8/2019 at 10:06 AM by Karen Sheridan    
21-Nov-2018 12:39

## 2019-03-08 NOTE — TELEPHONE ENCOUNTER
Postponing call, patient will be discharged today from [facility].     Jennifer Salas RN

## 2019-03-08 NOTE — TELEPHONE ENCOUNTER
Patient called to schedule an appointment for a hospital follow-up or appeared on a report showing that they were recently discharged from the hospital.    Patient was admitted to [facility]  Discharged date: 03/08/2019  Reason for hospital admission:  Severe fever  Does patient have future appointment scheduled with provider? Yes Dr. Cade  Date of future appointment:  3/11/2019 at 3:30pm      This information will be used to help the care team plan for the patients upcoming visit.  The triage RN may determine that a follow up call is necessary and reach out to the patient via the phone number listed in the chart.     Please route this message on routine priority to the Triage RN pool.

## 2019-03-11 NOTE — TELEPHONE ENCOUNTER
Follow up encounter documentation:    Reason for follow up: Bernard Hughes appeared on our list for recent discharge from an Emergency Room, inpatient admission, or TCU/nursing home facility.    Visit date: 3/8/2019  Location: [facility]   Reason for visit: fevers   Discharge instructions include: follow up with pcp in clinic  Follow up phone call indicated? Yes. Details: I spoke with pt. She is feeling much better. F/u rescheduled to 3/25/2019 due to furnace problems last night.   No further questions or concerns.   Sherita Moya, RN, BSN

## 2019-03-12 NOTE — TELEPHONE ENCOUNTER
Calling to report a high blood sugar at 9 am it was 482.  At 11 it was down to 326.    She is on Prednisone and she has be asymptomatic.  Wanted to know if anything is recommended?    Thank you,  Tiffany SILVERIO

## 2019-03-12 NOTE — TELEPHONE ENCOUNTER
She could increase her lantus insulin by 20 % while on steroids,     If still at 15 units go to 18 units, if 20 go to 24 units.

## 2019-03-18 NOTE — TELEPHONE ENCOUNTER
Patient has been sick and testing blood sugars 3-4 times daily instead of the one per day per last script. Is anyone willing to write for 3-4 times daily testing because the patient is fully out of test strips?    Thank You,  Quinten Loco, Pharmacy [facility] [place]

## 2019-03-22 NOTE — TELEPHONE ENCOUNTER
Reason for Call:  Medication or medication refill:    Do you use a [facility]?  Name of the pharmacy and phone number for the current request:  [facility] - [phone]    Name of the medication requested: Humalog Quick Pen    Other request: this was prescribed initially by Mandi and will need it to be prescribed by Dr Cade now.  She just ran out.    Can we leave a detailed message on this number? YES    Phone number patient can be reached at: Cell number on file:    Telephone Information:   Mobile [phone]       Best Time:     Call taken on 3/22/2019 at 9:36 AM by Tiffany Romero

## 2019-03-25 NOTE — PROGRESS NOTES
SUBJECTIVE:   Bernard Hughes is a 53 year old female who presents to clinic today for the following health issues:    Hospital Follow-up Visit:    Hospital/Nursing Home/IP Rehab Facility: [facility]  Date of Admission: 3/5/19  Date of Discharge: 3/8/19  Reason(s) for Admission: BP and Fever            Problems taking medications regularly:  None       Medication changes since discharge: None       Problems adhering to non-medication therapy:  None    Summary of hospitalization:  Discharge summary unavailable  Diagnostic Tests/Treatments reviewed.  Follow up needed: none  Other Healthcare Providers Involved in Patient s Care:         None homecare  Update since discharge: improved.     Post Discharge Medication Reconciliation: discharge medications reconciled and changed, per note/orders (see AVS).  Plan of care communicated with patient and family        She was in [facility] for a few days in early March.  Fever and blood pressure was high, Home OT visiting and got confused so call emergency.  Was stuttering.      She is now declared disabled, just got that news.      Pet scan didn't light up, but has lesion in the left lower lobe.     Doing labs again this week and seeing nephrology next week.      Hgb was at 10 up from 8 and wbc was low.    Sugars are getting back to normal. 120 today, they had been up with prednisone.  Aviva     Needs humalog kwikpen, taking 6-10 units with meals.     Past Medical History:   Diagnosis Date     ASCVD (arteriosclerotic cardiovascular disease)     3 vessel bypass 3/2017     Congestive heart failure (H)      COPD (chronic obstructive pulmonary disease) (H)      Coronary artery disease     h/o coronary stents x 2     Depressive disorder      Diabetes (H)      History of blood transfusion     1985     Hypertension      Kidney disease      Current Outpatient Medications   Medication     ALPRAZolam (XANAX) 0.5 MG tablet     apixaban ANTICOAGULANT (ELIQUIS) 5 MG tablet     aspirin 81 MG EC  tablet     atorvastatin (LIPITOR) 40 MG tablet     azithromycin (ZITHROMAX) 250 MG tablet     blood glucose (NO BRAND SPECIFIED) test strip     carvedilol (COREG) 12.5 MG tablet     cetirizine (ZYRTEC) 10 MG tablet     ferrous sulfate (FE TABS) 325 (65 Fe) MG EC tablet     FLUoxetine (PROZAC) 20 MG capsule     fluticasone-salmeterol (ADVAIR) 100-50 MCG/DOSE diskus inhaler     furosemide (LASIX) 40 MG tablet     glyBURIDE (DIABETA /MICRONASE) 5 MG tablet     guaiFENesin (MUCINEX) 600 MG 12 hr tablet     HUMALOG KWIKPEN 100 UNIT/ML soln     insulin glargine (BASAGLAR KWIKPEN) 100 UNIT/ML pen     insulin pen needle (BD MYRIAM U/F) 32G X 4 MM miscellaneous     ipratropium - albuterol 0.5 mg/2.5 mg/3 mL (DUONEB) 0.5-2.5 (3) MG/3ML neb solution     Isosorbide Mononitrate  MG TB24     nystatin (MYCOSTATIN) 573347 UNIT/GM POWD     traZODone (DESYREL) 50 MG tablet     umeclidinium (INCRUSE ELLIPTA) 62.5 MCG/INH oral inhaler     VENTOLIN  (90 BASE) MCG/ACT Inhaler     HYDROcodone-acetaminophen (NORCO) 5-325 MG tablet     nitroGLYcerin (NITROSTAT) 0.4 MG sublingual tablet     order for DME     No current facility-administered medications for this visit.      Social History     Tobacco Use     Smoking status: Former Smoker     Packs/day: 1.00     Years: 30.00     Pack years: 30.00     Last attempt to quit: 2016     Years since quittin.2     Smokeless tobacco: Never Used   Substance Use Topics     Alcohol use: No     Alcohol/week: 0.0 oz     Frequency: Never     Drug use: Yes     Types: Marijuana     Comment: quit in her 40s     Review of Systems  Constitutional-No fevers, chills, or weight changes..  ENT-No earpain, sore throat, voice changes or rhinitis.  Cardiac-No chest pain or palpitations.  Lungs- chronic sob.  Abd-no n,v,d,c      Physical Exam  /74 (Cuff Size: Adult Large)   Pulse 74   Temp 97.9  F (36.6  C) (Temporal)   Resp 16   Wt 95.3 kg (210 lb)   SpO2 97%   BMI 38.41 kg/m     Head-Normocephalic. No masses, lesions, tenderness or abnormalities  Cardiac-regular rate and rhythm  with normal S1, S2 ; no murmur, rub or gallops  Lungs-slight crackles in the left lower lobe,   Extremities-no peripheral edema, peripheral pulses normal    ASSESSMENT:  This is a patient here for hospital follow-up.  She has severe COPD, some congestive heart failure as well.  Some chronic kidney disease stage III.  She was in the hospital with a low white count, some anemia, shortness of breath.  She was discharged March 8.  She is doing much better she is at home her oxygen is doing well she is moving a little bit.  Her sugars have gotten better since she is off the steroids.    Diabetes she will continue her insulin we did refill the Lantus 30 units a day and the Humalog at 4-6 units with meals.  We will check her A1c and see if this is improved since a few months ago.    Anemia we will check her hemoglobin she was down to 8 but then back up to 10 we will see where she is at the day.    Her breathing is doing better she has a few crackles in the right lower lobe but otherwise she is clear lungs she will continue her inhalers and medications for her heart and lungs.    We discussed her PET scan which thankfully was negative without any uptake into that left lower lobe nodule.  She follows with pulmonary for this lesion.  Electronically signed by Dorian Cade MD

## 2019-04-01 NOTE — TELEPHONE ENCOUNTER
Reason for Call: Request for an order or referral:    Order or referral being requested: Home care    Date needed: as soon as possible    Has the patient been seen by the PCP for this problem? YES    Additional comments: Patient was in hospital for COPD and discharged 3.31, patient will need orders for home care to start again. Please advise if there is a covering provider to do this in Dr Cade's absence.     Phone number Patient can be reached at:  [phone]    Best Time:  any    Can we leave a detailed message on this number?  Not Applicable    Call taken on 4/1/2019 at 2:33 PM by Felisa Savage

## 2019-04-03 NOTE — TELEPHONE ENCOUNTER
Mandi returning call and requesting verbal ok for orders. Verbal ok per Dr. Hughes in Dr. Cade's absence.  Taylor Rivers MA

## 2019-04-16 NOTE — TELEPHONE ENCOUNTER
Reason for Call:  Medication or medication refill:    Do you use a [facility]?  Name of the pharmacy and phone number for the current request:  [facility] [place]- [phone]    Name of the medication requested: Roxanol    Other request: Cookie from [facility] calling. She thinks that Bernard would be appropriate for hospice. Cookie is with their palliative care program. She thinks that Bernard would benefit from this medication for her breathing. Please call Cookie.     Can we leave a detailed message on this number? YES    Phone number Cookie can be reached at: [phone]  Best Time: any     Call taken on 4/16/2019 at 1:58 PM by Mariah Dickson

## 2019-04-16 NOTE — TELEPHONE ENCOUNTER
Cookie at [facility] was advised of the recommendations and verbalized understanding.

## 2019-05-01 NOTE — TELEPHONE ENCOUNTER
"furosemide  Last Written Prescription Date:  4/16/2018  Last Fill Quantity: 360,  # refills: 3   Last office visit: 3/25/2019 with prescribing provider:      Future Office Visit:      Requested Prescriptions   Pending Prescriptions Disp Refills     furosemide (LASIX) 40 MG tablet [Pharmacy Med Name: FUROSEMIDE 40MG TABS] 30 tablet 1     Sig: TAKE ONE TABLET BY MOUTH TWICE A DAY       Diuretics (Including Combos) Protocol Failed - 4/30/2019 10:01 AM        Failed - Blood pressure under 140/90 in past 12 months     BP Readings from Last 3 Encounters:   03/25/19 148/74   01/14/19 128/66   12/31/18 156/84           Failed - Normal serum creatinine on file in past 12 months     Recent Labs   Lab Test 03/25/19  1333   CR 1.41*            Passed - Recent (12 mo) or future (30 days) visit within the authorizing provider's specialty     Patient had office visit in the last 12 months or has a visit in the next 30 days with authorizing provider or within the authorizing provider's specialty.  See \"Patient Info\" tab in inbasket, or \"Choose Columns\" in Meds & Orders section of the refill encounter.            Passed - Medication is active on med list        Passed - Patient is age 18 or older        Passed - No active pregancy on record        Passed - Normal serum potassium on file in past 12 months     Recent Labs   Lab Test 03/25/19  1333   POTASSIUM 4.1            Passed - Normal serum sodium on file in past 12 months     Recent Labs   Lab Test 03/25/19  1333                 Passed - No positive pregnancy test in past 12 months          Routing refill request to provider for review/approval because:  Labs out of range:  Cr 1.41  Aleah Hughes RN on 5/1/2019 at 12:52 PM    "

## 2019-05-20 NOTE — TELEPHONE ENCOUNTER
"Requested Prescriptions   Pending Prescriptions Disp Refills     insulin pen needle (BD MYRIAM U/F) 32G X 4 MM miscellaneous [Pharmacy Med Name: BD PEN NEEDLE MYRIAM  32G X 4 MM MISC] 100 each 1     Sig: USE ONE PEN NEEDLE DAILY OR AS DIRECTED   Last Written Prescription Date:  2/12/19  Last Fill Quantity: 100,  # refills: 1   Last office visit: 3/25/2019 with prescribing provider:     Future Office Visit:        Diabetic Supplies Protocol Passed - 5/17/2019 12:22 PM        Passed - Medication is active on med list        Passed - Patient is 18 years of age or older        Passed - Recent (6 mo) or future (30 days) visit within the authorizing provider's specialty     Patient had office visit in the last 6 months or has a visit in the next 30 days with authorizing provider.  See \"Patient Info\" tab in inbasket, or \"Choose Columns\" in Meds & Orders section of the refill encounter.            Prescription approved per [facility] Refill Protocol.  Shawna Curiel RN    "

## 2019-05-21 NOTE — TELEPHONE ENCOUNTER
Controlled Substance Refill Request for alprazolam  Problem List Complete:  No     PROVIDER TO CONSIDER COMPLETION OF PROBLEM LIST AND OVERVIEW/CONTROLLED SUBSTANCE AGREEMENT    Last Written Prescription Date:  3/25/2019  Last Fill Quantity: 30,   # refills: 0    Last Office Visit with [facility] primary care provider: 3/25/2019    Controlled Substance Refill Request for NORCO  Problem List Complete:  No     PROVIDER TO CONSIDER COMPLETION OF PROBLEM LIST AND OVERVIEW/CONTROLLED SUBSTANCE AGREEMENT    Last Written Prescription Date:  3/25/2019  Last Fill Quantity: 30,   # refills: 0    Last Office Visit with [facility] primary care provider: 3/25/2019    Future Office visit:     Controlled substance agreement:   Encounter-Level CSA:    There are no encounter-level csa.     Patient-Level CSA:    There are no patient-level csa.         Last Urine Drug Screen: No results found for: CDAUT, No results found for: COMDAT, No results found for: THC13, PCP13, COC13, MAMP13, OPI13, AMP13, BZO13, TCA13, MTD13, BAR13, OXY13, PPX13, BUP13       [email or web address]       checked in past 3 months?  Yes 5/21/2019

## 2019-05-29 NOTE — TELEPHONE ENCOUNTER
Patient is due for a PHQ-9.  Index start date:2/15/2019  Index end date:6/15/2019    Please call patient.

## 2019-06-03 NOTE — TELEPHONE ENCOUNTER
Pt completed PHQ-9.    PHQ-9 SCORE 6/3/2019   PHQ-9 Total Score 12     Mari Marie CMA ([facility])

## 2019-06-04 NOTE — TELEPHONE ENCOUNTER
Reason for Call:  Form, our goal is to have forms completed with 72 hours, however, some forms may require a visit or additional information.    Type of letter, form or note:  Home Health Certification    Who is the form from?: Home care    Where did the form come from: form was faxed in    What clinic location was the form placed at?: [facility]    Where the form was placed: Given to MA/RN    What number is listed as a contact on the form?:        Additional comments:     Call taken on 6/4/2019 at 11:08 AM by Aidee Araujo

## 2019-06-04 NOTE — TELEPHONE ENCOUNTER
Medication reconciliation completed by RN. Form and chart forwarded to PCP for signatures.    Aleah Hughes RN

## 2019-06-05 NOTE — TELEPHONE ENCOUNTER
.Requested Prescriptions   Pending Prescriptions Disp Refills     ELIQUIS 5 MG tablet [Pharmacy Med Name: ELIQUIS 5MG TABS] 90 tablet 1     Sig: TAKE ONE TABLET BY MOUTH TWICE A DAY   Last Written Prescription Date:  2/11/2019  Last Fill Quantity: 90,  # refills: 1   Last office visit: 3/25/2019 with prescribing provider:     Future Office Visit:        Direct Oral Anticoagulant Agents Failed - 6/3/2019  6:24 PM        Failed - Serum creatinine less than or equal to 1.4 on file in past 12 mos     Recent Labs   Lab Test 03/25/19  1333   CR 1.41*           Passed - Normal Platelets on file in past 12 months     Recent Labs   Lab Test 01/14/19  1511              Passed - Medication is active on med list        Passed - Patient is 18-79 years of age        Passed - Weight is greater than 60 kg for the past year     Wt Readings from Last 3 Encounters:   03/25/19 95.3 kg (210 lb)   01/14/19 90.7 kg (200 lb)   12/31/18 95.3 kg (210 lb)             Passed - No active pregnancy on record        Passed - No positive pregnancy test within past 12 months        Passed - Recent (6 mo) or future (30 days) visit within the authorizing provider's specialty      Routing refill request to provider for review/approval because:  Labs out of range:  JAMIL Curiel RN

## 2019-07-17 NOTE — TELEPHONE ENCOUNTER
"Last Written Prescription Date:  9/4/18  Last Fill Quantity: 180,  # refills: 1   Last office visit: 3/25/2019 with prescribing provider:  Dorian Cade   Future Office Visit:      Requested Prescriptions   Pending Prescriptions Disp Refills     glyBURIDE (DIABETA /MICRONASE) 5 MG tablet [Pharmacy Med Name: GLYBURIDE 5MG TABS] 180 tablet 1     Sig: TAKE ONE TABLET BY MOUTH TWICE A DAY WITH MEALS       Sulfonylurea Agents Failed - 7/17/2019  8:13 AM        Failed - Blood pressure less than 140/90 in past 6 months     BP Readings from Last 3 Encounters:   03/25/19 148/74   01/14/19 128/66   12/31/18 156/84                 Failed - Patient has had a Microalbumin in the past 15 mos.     No lab results found.          Failed - Patient has documented A1c within the specified period of time.     If HgbA1C is 8 or greater, it needs to be on file within the past 3 months.  If less than 8, must be on file within the past 6 months.     Recent Labs   Lab Test 03/25/19  1333   A1C 9.2*             Failed - Patient has a recent creatinine (normal) within the past 12 mos.     Recent Labs   Lab Test 03/25/19  1333   CR 1.41*             Passed - Patient has documented LDL within the past 12 mos.     Recent Labs   Lab Test 08/14/18  0822   LDL 86             Passed - Medication is active on med list        Passed - Patient is age 18 or older        Passed - No active pregnancy on record        Passed - Patient has not had a positive pregnancy test within the past 12 mos.        Passed - Recent (6 mo) or future (30 days) visit within the authorizing provider's specialty     Patient had office visit in the last 6 months or has a visit in the next 30 days with authorizing provider or within the authorizing provider's specialty.  See \"Patient Info\" tab in inbasket, or \"Choose Columns\" in Meds & Orders section of the refill encounter.            Routing refill request to provider for review/approval because:  Labs out of range:  BP, A1C, " CR  Labs not current:  Micro albumin    Kiana Llanos RN on 7/17/2019 at 3:49 PM

## 2020-01-29 ENCOUNTER — TELEPHONE (OUTPATIENT)
Dept: INTERNAL MEDICINE | Facility: CLINIC | Age: 55
End: 2020-01-29

## 2020-01-29 NOTE — TELEPHONE ENCOUNTER
Patient is due for a PHQ-9.  Index start date:10/04/2019  Index end date:2/01/2020    Please call patient.

## 2020-01-29 NOTE — TELEPHONE ENCOUNTER
Pt is  per care everywhere encounter dated 2019. Will close the encounter. Mari Marie CMA ([facility])

## 2020-02-04 NOTE — TELEPHONE ENCOUNTER
Prescription approved per [facility] Refill Protocol.    Kiana Llanos RN   SW called Senior Coordinator of Operations, Lars ([phone]), of pt.’s [facility] ([address], [place] [address]) to inform him of pt.’s discharge. As per Lars, pt. is able to return to group home via ambulance. Team is aware of above.

## 2022-02-17 PROBLEM — I25.10 CORONARY ARTERY DISEASE INVOLVING NATIVE CORONARY ARTERY OF NATIVE HEART: Status: ACTIVE | Noted: 2017-02-21

## 2022-08-08 NOTE — TELEPHONE ENCOUNTER
Routed to Dr. Cade to advise.    This patient has been assessed with a concern for Malnutrition and was treated during this hospitalization for the following Nutrition diagnosis/diagnoses:     -  08/07/2022: Morbid obesity (BMI > 40)

## 2024-06-10 NOTE — TELEPHONE ENCOUNTER
Jeanine is calling back having some questions about medications that were not addressed in the vm. Please call her back again when possible.   
Left message on Jeanine's voicemail of Dr. Cade's response/orders.  
Nurse had a couple questions.  Should pt be taking aspirin along with plavix?  Pt's lisinopril 20 mg bid is on hold hold until labs are done next week, wondering if you are fine with that.  
Nurse informed of Dr. Cade's response/orders.  
Ok for orders    
Reason for Call: Request for an order or referral:    Order or referral being requested: Jeanine from [facility], asking to resume homecare orders..skilled nursing 3 times per week, Physical Therapy & Occupational Therapy to evaluate & treat, home donato aide one time per week.    Date needed: as soon as possible    Has the patient been seen by the PCP for this problem? YES    Additional comments: Jeanine also has a few medication questions and requesting a call back TODAY.    Phone number Patient can be reached at:  Other phone number:  [phone]  Jeanine from [facility]    Best Time:      Can we leave a detailed message on this number?  YES    Call taken on 11/3/2017 at 3:26 PM by Lorelei Asher    
Yes aspirin and plavix, ok to hold lisinopril    
done